# Patient Record
Sex: FEMALE | Race: WHITE | NOT HISPANIC OR LATINO | Employment: OTHER | URBAN - METROPOLITAN AREA
[De-identification: names, ages, dates, MRNs, and addresses within clinical notes are randomized per-mention and may not be internally consistent; named-entity substitution may affect disease eponyms.]

---

## 2023-04-05 ENCOUNTER — NURSING HOME VISIT (OUTPATIENT)
Dept: POST ACUTE CARE | Facility: EXTERNAL LOCATION | Age: 63
End: 2023-04-05
Payer: COMMERCIAL

## 2023-04-05 DIAGNOSIS — F25.0 SCHIZOAFFECTIVE DISORDER, BIPOLAR TYPE (MULTI): ICD-10-CM

## 2023-04-05 DIAGNOSIS — E78.2 HYPERLIPEMIA, MIXED: ICD-10-CM

## 2023-04-05 DIAGNOSIS — M48.061 SPINAL STENOSIS OF LUMBAR REGION WITHOUT NEUROGENIC CLAUDICATION: ICD-10-CM

## 2023-04-05 DIAGNOSIS — F33.41 RECURRENT MAJOR DEPRESSIVE DISORDER, IN PARTIAL REMISSION (CMS-HCC): ICD-10-CM

## 2023-04-05 DIAGNOSIS — I48.0 PAROXYSMAL ATRIAL FIBRILLATION (MULTI): ICD-10-CM

## 2023-04-05 DIAGNOSIS — J44.9 COPD MIXED TYPE (MULTI): Primary | ICD-10-CM

## 2023-04-05 DIAGNOSIS — K21.9 GERD WITHOUT ESOPHAGITIS: ICD-10-CM

## 2023-04-05 DIAGNOSIS — G45.9 TIA (TRANSIENT ISCHEMIC ATTACK): ICD-10-CM

## 2023-04-05 PROBLEM — M79.7 FIBROMYALGIA: Status: ACTIVE | Noted: 2023-04-05

## 2023-04-05 PROBLEM — M54.17 LUMBOSACRAL RADICULOPATHY AT L4: Status: ACTIVE | Noted: 2023-04-05

## 2023-04-05 PROCEDURE — 99309 SBSQ NF CARE MODERATE MDM 30: CPT | Performed by: INTERNAL MEDICINE

## 2023-04-05 NOTE — LETTER
Patient: Gray Kim  : 1960    Encounter Date: 2023    Patient ID: Gray Kim is a 62 y.o. female who presents for No chief complaint on file..    Assessment/Plan    Problem List Items Addressed This Visit          Nervous    Spinal stenosis of lumbar region without neurogenic claudication     MRI of the brain neurosurgery evaluation at [facility] for bladder bowel dysfunction            Respiratory    COPD mixed type (CMS/HCC) - Primary     Pt has moderate to severe COPD. It is progressive disease, use of MDI and proper technique discussed, rinse mouth after inhaled corticosteroids. Notify if progressive dyspnea or cough or lethargy, monitor oxygen saturation if possible with home based pulse oximetry. Avoid hospitalization and call us if any flare ups are about to happen, be sure that annual flu vaccine are uptodate and review need for pneumococcal vaccine. Light to moderate low impact exercises are very helpful and deep breathing  exercises are beneficial in chronic lung diseases.             Circulatory    Paroxysmal atrial fibrillation (CMS/HCC)     Pt has history AF, rate &/or rhythm therapy has been considered, Anticoagulation as listed and as appropriate. Atrial fibrillation is very common as we age. Fast ventricular rate or simply rate control strategy leads to poor quality of life with chronic fatigue, dyspnea and lack of endurance. Chadsvasc score has been looked into. Cardiology if managing QT prolongation as appropriate. If palpitations or SOB happens then please notify us. QOL can be maintained as good as we can with proper strategic therapy for AF.Chances of embolism or embolic events remain high without anticoagulation therapy.           TIA (transient ischemic attack)       Digestive    GERD without esophagitis       Other    Schizoaffective disorder, bipolar type (CMS/HCC)    Hyperlipemia, mixed    Recurrent major depressive disorder, in partial remission (CMS/HCC)      Depression is chronic and quite common and notorious mental health disorder and it is quite common and widespread, there are several therapeutics available for depression now a days. It is considered as chemical imbalance disorder and with medications , it can be adjusted. There are side effects from SSRI and SNRIs but on long term, they are well tolerated. Please do not feel awkward or shy to contact us if feel tired, sleepy, lack of energy or aloof/ alone. Untreated depression can bring serious consequences including suicidal ideations, mental health counselling is available if need arises. Usually treatment of depression is long lasting therapy with periodic evaluations and follow ups. Pt with depression no longer have to feel frustrated, helpless or isolated.Detailed discussion was carried out.           This patient was seen for my regular monthly visit, nursing evaluations and nursing notes were reviewed, interim events are reviewed, interim concerns and messages were reviewed as we have communicated with nursing staff.  Any issues with the falls, skin care impairment, declining physical condition are reviewed and noted, diagnosis list were reviewed, list of medications were reviewed, living will related issues were reviewed, overall patient has been doing well, any declining in patient's condition or any change in patient's condition needs to be notified to physician promptly, discussed with nursing staff, if needed would communicate with family.  Patient stays confined here at the facility for long-term care, there are always concerns about long-term care related issues and concerns.  Nursing staff is trying their best to keep patient safe, all sort of measures has been taken to keep patient safe and comfortable.   Source of history: Nurse, Medical personnel, Medical record, Patient.  History limitation: None.      HPI  62-year-old patient had a complex medical problem COPD history is affective disorder  bipolar anxiety hypertension GERD cardiomegaly obesity psychosis and anemia spondyloptosis TIA history of hypoxic respiratory failure with COPD evaluated for cough congestion arthralgia myalgia fatigue tired weakness continue edema given Lasix potassium magnesium continue home back pain refer patient to [facility] for MRI of the spine and the neurology evaluation.  Continue gabapentin and meloxicam    Not on File    Medications   Order Order Status Revised Last Ordered    Atorvastatin Calcium Oral Tablet 40 MG (Atorvastatin Calcium) Active 3/13/2023 3/13/2023  This order is potentially duplicated by other orders on the chart. Click to view details.    Furosemide Oral Tablet 40 MG (Furosemide) Active 3/8/2023 3/8/2023    Polyethylene Glycol 3350 Powder (Polyethylene Glycol 3350 (Bulk)) Active 1/13/2023     Docusate Sodium Oral Capsule 100 MG (Docusate Sodium) Active 1/13/2023     Acetaminophen Oral Tablet 500 MG (Acetaminophen) Active 1/16/2023     Gabapentin Oral Tablet 600 MG (Gabapentin) Active 3/10/2023 3/10/2023    Cold Sore Products External Ointment (Cold Sore Products) Active 1/13/2023     Albuterol Sulfate HFA Inhalation Aerosol Solution 108 (90 Base) MCG/ACT (Albuterol Sulfate) Active 4/2/2023 4/2/2023    Cholecalciferol Oral Tablet 50 MCG (2000 UT) (Cholecalciferol) Active 1/14/2023   This order is potentially duplicated by other orders on the chart. Click to view details.    Loratadine Oral Tablet 10 MG (Loratadine) Active 1/14/2023     Ferrous Sulfate Oral Tablet 325 (65 Fe) MG (Ferrous Sulfate) Active 1/14/2023     Omeprazole Oral Capsule Delayed Release 20 MG (Omeprazole) Active 3/3/2023 3/3/2023    Fluticasone Propionate Nasal Suspension 50 MCG/ACT (Fluticasone Propionate (Nasal)) Active 1/14/2023     Saline Mist Spray Nasal Solution 0.65 % (Saline) Active 1/13/2023     guaiFENesin Oral Liquid 100 MG/5ML (Guaifenesin) Active 1/18/2023   This order is potentially duplicated by other orders on the chart.  Click to view details.    Ibuprofen Oral Tablet 200 MG (Ibuprofen) Active 1/16/2023     Rivaroxaban Oral Tablet 20 MG (Rivaroxaban) Active 3/29/2023 3/29/2023    traZODone HCl Oral Tablet 100 MG (Trazodone HCl) Active 3/13/2023 3/13/2023    Divalproex Sodium Oral Tablet Delayed Release 125 MG (Divalproex Sodium) Active 3/24/2023 3/24/2023    Artificial Tears Ophthalmic Solution 0.5-0.6 % (Polyvinyl Alcohol-Povidone (Ophth)) Active 1/16/2023     Lidocaine Pain Relief External Patch 4 % (Lidocaine) Active 1/21/2023 1/16/2023    Ammonium Lactate External Lotion 12 % (Lactic Acid (Ammonium Lactate)) Active 1/20/2023   This order is potentially duplicated by other orders on the chart. Click to view details.    traMADol HCl Oral Tablet 50 MG (Tramadol HCl) Active 3/22/2023 3/22/2023    busPIRone HCl Oral Tablet 10 MG (Buspirone HCl) Active 3/16/2023 3/16/2023    Simethicone Oral Tablet Chewable 80 MG (Simethicone) Active 2/12/2023 2/10/2023    ZyPREXA Oral Tablet 10 MG (Olanzapine) Active 3/13/2023 3/13/2023  This order is potentially duplicated by other orders on the chart. Click to view details.    hydrOXYzine HCl Oral Tablet 25 MG (Hydroxyzine HCl) Active 3/5/2023 3/5/2023    Nicotine Polacrilex Mouth/Throat Gum 2 MG (Nicotine Polacrilex) Active 3/26/2023 3/26/2023    Bisacodyl Oral Tablet Delayed Release 5 MG (Bisacodyl) Active 3/23/2023 3/23/2023  This order is potentially duplicated by other orders on the chart. Click to view details.    Meloxicam Tablet 7.5 MG Active 3/27/2023 3/26/2023  This order is potentially duplicated by other orders on the chart. Click to view details.    Furosemide Oral Tablet 20 MG (Furosemide) Active 4/5/2023 4/4/2023        Objective  Visit Vitals  Smoking Status Former     Current Vitals   BP: 117/60 mmHg  4/5/2023 12:34    Temp:97.3 °F  4/5/2023 12:34  Pulse:74 bpm  4/5/2023 12:34    Weight:290.8 Lbs  4/3/2023 09:56  Resp:20 Breaths/min  4/5/2023 12:34  BS:97 mg/dL  2/3/2023  07:34  O2:95 %  4/5/2023 09:52  Pain:0  4/5/2023 14:41      PHYSICAL EXAM  General: NAD. NCAT. Aox3   HEENT: PERRLA. EOMI. MMM. Nares patent bl.  Cardiovascular: Irregular  Respiratory: Crackles/rhonchi arthritis of spine  GI: Soft, NT abdomen. BS present x 4.   : No CVAT BL  MSK: Lumbar cervical radiculopathy  Extremities: No edema. Cap refill < 2 sec.   Skin: No rashes or bruises.   Neuro: Aox3. Cranial Nerves grossly intact. Motor/sensory wnl.   Psych: Mood wnl.          ROS  Constitutional: Denies fevers, chills, fatigue, weight loss/gain  HEENT: Denies HA, vision changes, hearing loss, sore throat  Cardiac: Denies CP, palpitations, edema  Respiratory: Denies SOB, cough, pleuritic chest pain, PND, orthopnea  GI: Denies N/V/D, abd pain, constipation, black/bloody stools  : Denies urinary changes, frequency, hematuria, urgency, retention, flank pain  MSK: Denies joint pain, joint swelling, back pain, neck pain, extremity pain  Neuro: Denies numbness, weakness, tingling      There is no immunization history on file for this patient.    [facility] Encounter on 01/03/2023   Component Date Value Ref Range Status   • WBC 01/03/2023 7.5  4.4 - 11.3 x10E9/L Final   • nRBC 01/03/2023 0.0  0.0 - 0.0 /100 WBC Final   • RBC 01/03/2023 3.89 (L)  4.00 - 5.20 x10E12/L Final   • Hemoglobin 01/03/2023 10.1 (L)  12.0 - 16.0 g/dL Final   • Hematocrit 01/03/2023 34.1 (L)  36.0 - 46.0 % Final   • MCV 01/03/2023 88  80 - 100 fL Final   • MCHC 01/03/2023 29.6 (L)  32.0 - 36.0 g/dL Final   • Platelets 01/03/2023 323  150 - 450 x10E9/L Final   • RDW 01/03/2023 17.8 (H)  11.5 - 14.5 % Final   • Glucose 01/03/2023 82  74 - 99 mg/dL Final   • Sodium 01/03/2023 136  136 - 145 mmol/L Final   • Potassium 01/03/2023 5.0  3.5 - 5.3 mmol/L Final   • Chloride 01/03/2023 100  98 - 107 mmol/L Final   • Bicarbonate 01/03/2023 30  21 - 32 mmol/L Final   • Anion Gap 01/03/2023 11  10 - 20 mmol/L Final   • Urea Nitrogen 01/03/2023 24 (H)  6 - 23 mg/dL  Final   • Creatinine 01/03/2023 0.87  0.50 - 1.05 mg/dL Final   • GFR Female 01/03/2023 76  >90 mL/min/1.73m2 Final   • Calcium 01/03/2023 10.0  8.6 - 10.3 mg/dL Final       Radiology: Reviewed imaging in powerchart.  No results found.    No family history on file.  Social History     Socioeconomic History   • Marital status: Single     Spouse name: None   • Number of children: None   • Years of education: None   • Highest education level: None   Occupational History   • None   Tobacco Use   • Smoking status: Former     Types: Cigarettes   • Smokeless tobacco: Never   Vaping Use   • Vaping status: None   Substance and Sexual Activity   • Alcohol use: Not Currently   • Drug use: Not Currently   • Sexual activity: None   Other Topics Concern   • None   Social History Narrative   • None     Social Determinants of Health     Financial Resource Strain: Not on file   Food Insecurity: Not on file   Transportation Needs: Not on file   Physical Activity: Not on file   Stress: Not on file   Social Connections: Not on file   Intimate Partner Violence: Not on file   Housing Stability: Not on file     Past Medical History:   Diagnosis Date   • Bipolar disorder, currently in remission, most recent episode unspecified (CMS/[facility]) 02/11/2021    History of depressed bipolar disorder     No past surgical history on file.  * Cannot find OR log *    Charting was completed using voice recognition technology and may include unintended errors.           Electronically Signed By: Gil Disla MD   4/5/23  8:27 PM

## 2023-04-06 NOTE — ASSESSMENT & PLAN NOTE
Pt has history AF, rate &/or rhythm therapy has been considered, Anticoagulation as listed and as appropriate. Atrial fibrillation is very common as we age. Fast ventricular rate or simply rate control strategy leads to poor quality of life with chronic fatigue, dyspnea and lack of endurance. Chadsvasc score has been looked into. Cardiology if managing QT prolongation as appropriate. If palpitations or SOB happens then please notify us. QOL can be maintained as good as we can with proper strategic therapy for AF.Chances of embolism or embolic events remain high without anticoagulation therapy.

## 2023-04-06 NOTE — ASSESSMENT & PLAN NOTE
Depression is chronic and quite common and notorious mental health disorder and it is quite common and widespread, there are several therapeutics available for depression now a days. It is considered as chemical imbalance disorder and with medications , it can be adjusted. There are side effects from SSRI and SNRIs but on long term, they are well tolerated. Please do not feel awkward or shy to contact us if feel tired, sleepy, lack of energy or aloof/ alone. Untreated depression can bring serious consequences including suicidal ideations, mental health counselling is available if need arises. Usually treatment of depression is long lasting therapy with periodic evaluations and follow ups. Pt with depression no longer have to feel frustrated, helpless or isolated.Detailed discussion was carried out.

## 2023-04-06 NOTE — PROGRESS NOTES
Patient ID: Gray Kim is a 62 y.o. female who presents for No chief complaint on file..    Assessment/Plan     Problem List Items Addressed This Visit          Nervous    Spinal stenosis of lumbar region without neurogenic claudication     MRI of the brain neurosurgery evaluation at [facility] for bladder bowel dysfunction            Respiratory    COPD mixed type (CMS/HCC) - Primary     Pt has moderate to severe COPD. It is progressive disease, use of MDI and proper technique discussed, rinse mouth after inhaled corticosteroids. Notify if progressive dyspnea or cough or lethargy, monitor oxygen saturation if possible with home based pulse oximetry. Avoid hospitalization and call us if any flare ups are about to happen, be sure that annual flu vaccine are uptodate and review need for pneumococcal vaccine. Light to moderate low impact exercises are very helpful and deep breathing  exercises are beneficial in chronic lung diseases.             Circulatory    Paroxysmal atrial fibrillation (CMS/HCC)     Pt has history AF, rate &/or rhythm therapy has been considered, Anticoagulation as listed and as appropriate. Atrial fibrillation is very common as we age. Fast ventricular rate or simply rate control strategy leads to poor quality of life with chronic fatigue, dyspnea and lack of endurance. Chadsvasc score has been looked into. Cardiology if managing QT prolongation as appropriate. If palpitations or SOB happens then please notify us. QOL can be maintained as good as we can with proper strategic therapy for AF.Chances of embolism or embolic events remain high without anticoagulation therapy.           TIA (transient ischemic attack)       Digestive    GERD without esophagitis       Other    Schizoaffective disorder, bipolar type (CMS/HCC)    Hyperlipemia, mixed    Recurrent major depressive disorder, in partial remission (CMS/HCC)     Depression is chronic and quite common and notorious mental health disorder and  it is quite common and widespread, there are several therapeutics available for depression now a days. It is considered as chemical imbalance disorder and with medications , it can be adjusted. There are side effects from SSRI and SNRIs but on long term, they are well tolerated. Please do not feel awkward or shy to contact us if feel tired, sleepy, lack of energy or aloof/ alone. Untreated depression can bring serious consequences including suicidal ideations, mental health counselling is available if need arises. Usually treatment of depression is long lasting therapy with periodic evaluations and follow ups. Pt with depression no longer have to feel frustrated, helpless or isolated.Detailed discussion was carried out.           This patient was seen for my regular monthly visit, nursing evaluations and nursing notes were reviewed, interim events are reviewed, interim concerns and messages were reviewed as we have communicated with nursing staff.  Any issues with the falls, skin care impairment, declining physical condition are reviewed and noted, diagnosis list were reviewed, list of medications were reviewed, living will related issues were reviewed, overall patient has been doing well, any declining in patient's condition or any change in patient's condition needs to be notified to physician promptly, discussed with nursing staff, if needed would communicate with family.  Patient stays confined here at the facility for long-term care, there are always concerns about long-term care related issues and concerns.  Nursing staff is trying their best to keep patient safe, all sort of measures has been taken to keep patient safe and comfortable.   Source of history: Nurse, Medical personnel, Medical record, Patient.  History limitation: None.      HPI  62-year-old patient had a complex medical problem COPD history is affective disorder bipolar anxiety hypertension GERD cardiomegaly obesity psychosis and anemia  spondyloptosis TIA history of hypoxic respiratory failure with COPD evaluated for cough congestion arthralgia myalgia fatigue tired weakness continue edema given Lasix potassium magnesium continue home back pain [facility] and the neurology evaluation.  Continue gabapentin and meloxicam    Not on File    Medications   Order Order Status Revised Last Ordered    Atorvastatin Calcium Oral Tablet 40 MG (Atorvastatin Calcium) Active 3/13/2023 3/13/2023  This order is potentially duplicated by other orders on the chart. Click to view details.    Furosemide Oral Tablet 40 MG (Furosemide) Active 3/8/2023 3/8/2023    Polyethylene Glycol 3350 Powder (Polyethylene Glycol 3350 (Bulk)) Active 1/13/2023     Docusate Sodium Oral Capsule 100 MG (Docusate Sodium) Active 1/13/2023     Acetaminophen Oral Tablet 500 MG (Acetaminophen) Active 1/16/2023     Gabapentin Oral Tablet 600 MG (Gabapentin) Active 3/10/2023 3/10/2023    Cold Sore Products External Ointment (Cold Sore Products) Active 1/13/2023     Albuterol Sulfate HFA Inhalation Aerosol Solution 108 (90 Base) MCG/ACT (Albuterol Sulfate) Active 4/2/2023 4/2/2023    Cholecalciferol Oral Tablet 50 MCG (2000 UT) (Cholecalciferol) Active 1/14/2023   This order is potentially duplicated by other orders on the chart. Click to view details.    Loratadine Oral Tablet 10 MG (Loratadine) Active 1/14/2023     Ferrous Sulfate Oral Tablet 325 (65 Fe) MG (Ferrous Sulfate) Active 1/14/2023     Omeprazole Oral Capsule Delayed Release 20 MG (Omeprazole) Active 3/3/2023 3/3/2023    Fluticasone Propionate Nasal Suspension 50 MCG/ACT (Fluticasone Propionate (Nasal)) Active 1/14/2023     Saline Mist Spray Nasal Solution 0.65 % (Saline) Active 1/13/2023     guaiFENesin Oral Liquid 100 MG/5ML (Guaifenesin) Active 1/18/2023   This order is potentially duplicated by other orders on the chart. Click to view details.    Ibuprofen Oral Tablet 200 MG  (Ibuprofen) Active 1/16/2023     Rivaroxaban Oral Tablet 20 MG (Rivaroxaban) Active 3/29/2023 3/29/2023    traZODone HCl Oral Tablet 100 MG (Trazodone HCl) Active 3/13/2023 3/13/2023    Divalproex Sodium Oral Tablet Delayed Release 125 MG (Divalproex Sodium) Active 3/24/2023 3/24/2023    Artificial Tears Ophthalmic Solution 0.5-0.6 % (Polyvinyl Alcohol-Povidone (Ophth)) Active 1/16/2023     Lidocaine Pain Relief External Patch 4 % (Lidocaine) Active 1/21/2023 1/16/2023    Ammonium Lactate External Lotion 12 % (Lactic Acid (Ammonium Lactate)) Active 1/20/2023   This order is potentially duplicated by other orders on the chart. Click to view details.    traMADol HCl Oral Tablet 50 MG (Tramadol HCl) Active 3/22/2023 3/22/2023    busPIRone HCl Oral Tablet 10 MG (Buspirone HCl) Active 3/16/2023 3/16/2023    Simethicone Oral Tablet Chewable 80 MG (Simethicone) Active 2/12/2023 2/10/2023    ZyPREXA Oral Tablet 10 MG (Olanzapine) Active 3/13/2023 3/13/2023  This order is potentially duplicated by other orders on the chart. Click to view details.    hydrOXYzine HCl Oral Tablet 25 MG (Hydroxyzine HCl) Active 3/5/2023 3/5/2023    Nicotine Polacrilex Mouth/Throat Gum 2 MG (Nicotine Polacrilex) Active 3/26/2023 3/26/2023    Bisacodyl Oral Tablet Delayed Release 5 MG (Bisacodyl) Active 3/23/2023 3/23/2023  This order is potentially duplicated by other orders on the chart. Click to view details.    Meloxicam Tablet 7.5 MG Active 3/27/2023 3/26/2023  This order is potentially duplicated by other orders on the chart. Click to view details.    Furosemide Oral Tablet 20 MG (Furosemide) Active 4/5/2023 4/4/2023        Objective   Visit Vitals  Smoking Status Former     Current Vitals   BP: 117/60 mmHg  4/5/2023 12:34    Temp:97.3 °F  4/5/2023 12:34  Pulse:74 bpm  4/5/2023 12:34    Weight:290.8 Lbs  4/3/2023 09:56  Resp:20 Breaths/min  4/5/2023 12:34  BS:97 mg/dL  2/3/2023 07:34  O2:95 %  4/5/2023 09:52  Pain:0  4/5/2023  14:41      PHYSICAL EXAM  General: NAD. NCAT. Aox3   HEENT: PERRLA. EOMI. MMM. Nares patent bl.  Cardiovascular: Irregular  Respiratory: Crackles/rhonchi arthritis of spine  GI: Soft, NT abdomen. BS present x 4.   : No CVAT BL  MSK: Lumbar cervical radiculopathy  Extremities: No edema. Cap refill < 2 sec.   Skin: No rashes or bruises.   Neuro: Aox3. Cranial Nerves grossly intact. Motor/sensory wnl.   Psych: Mood wnl.          ROS  Constitutional: Denies fevers, chills, fatigue, weight loss/gain  HEENT: Denies HA, vision changes, hearing loss, sore throat  Cardiac: Denies CP, palpitations, edema  Respiratory: Denies SOB, cough, pleuritic chest pain, PND, orthopnea  GI: Denies N/V/D, abd pain, constipation, black/bloody stools  : Denies urinary changes, frequency, hematuria, urgency, retention, flank pain  MSK: Denies joint pain, joint swelling, back pain, neck pain, extremity pain  Neuro: Denies numbness, weakness, tingling      There is no immunization history on file for this patient.    [facility] Encounter on 01/03/2023   Component Date Value Ref Range Status    WBC 01/03/2023 7.5  4.4 - 11.3 x10E9/L Final    nRBC 01/03/2023 0.0  0.0 - 0.0 /100 WBC Final    RBC 01/03/2023 3.89 (L)  4.00 - 5.20 x10E12/L Final    Hemoglobin 01/03/2023 10.1 (L)  12.0 - 16.0 g/dL Final    Hematocrit 01/03/2023 34.1 (L)  36.0 - 46.0 % Final    MCV 01/03/2023 88  80 - 100 fL Final    MCHC 01/03/2023 29.6 (L)  32.0 - 36.0 g/dL Final    Platelets 01/03/2023 323  150 - 450 x10E9/L Final    RDW 01/03/2023 17.8 (H)  11.5 - 14.5 % Final    Glucose 01/03/2023 82  74 - 99 mg/dL Final    Sodium 01/03/2023 136  136 - 145 mmol/L Final    Potassium 01/03/2023 5.0  3.5 - 5.3 mmol/L Final    Chloride 01/03/2023 100  98 - 107 mmol/L Final    Bicarbonate 01/03/2023 30  21 - 32 mmol/L Final    Anion Gap 01/03/2023 11  10 - 20 mmol/L Final    Urea Nitrogen 01/03/2023 24 (H)  6 - 23 mg/dL Final    Creatinine 01/03/2023 0.87  0.50 - 1.05 mg/dL Final     GFR Female 01/03/2023 76  >90 mL/min/1.73m2 Final    Calcium 01/03/2023 10.0  8.6 - 10.3 mg/dL Final       Radiology: Reviewed imaging in powerchart.  No results found.    No family history on file.  Social History     Socioeconomic History    Marital status: Single     Spouse name: None    Number of children: None    Years of education: None    Highest education level: None   Occupational History    None   Tobacco Use    Smoking status: Former     Types: Cigarettes    Smokeless tobacco: Never   Vaping Use    Vaping status: None   Substance and Sexual Activity    Alcohol use: Not Currently    Drug use: Not Currently    Sexual activity: None   Other Topics Concern    None   Social History Narrative    None     Social Determinants of Health     Financial Resource Strain: Not on file   Food Insecurity: Not on file   Transportation Needs: Not on file   Physical Activity: Not on file   Stress: Not on file   Social Connections: Not on file   Intimate Partner Violence: Not on file   Housing Stability: Not on file     Past Medical History:   Diagnosis Date    Bipolar disorder, currently in remission, most recent episode unspecified (CMS/[facility]) 02/11/2021    History of depressed bipolar disorder     No past surgical history on file.  * Cannot find OR log *    Charting was completed using voice recognition technology and may include unintended errors.

## 2023-04-06 NOTE — ASSESSMENT & PLAN NOTE
Pt has moderate to severe COPD. It is progressive disease, use of MDI and proper technique discussed, rinse mouth after inhaled corticosteroids. Notify if progressive dyspnea or cough or lethargy, monitor oxygen saturation if possible with home based pulse oximetry. Avoid hospitalization and call us if any flare ups are about to happen, be sure that annual flu vaccine are uptodate and review need for pneumococcal vaccine. Light to moderate low impact exercises are very helpful and deep breathing  exercises are beneficial in chronic lung diseases.

## 2023-07-15 ENCOUNTER — NURSING HOME VISIT (OUTPATIENT)
Dept: POST ACUTE CARE | Facility: EXTERNAL LOCATION | Age: 63
End: 2023-07-15
Payer: COMMERCIAL

## 2023-07-15 DIAGNOSIS — K21.9 GERD WITHOUT ESOPHAGITIS: ICD-10-CM

## 2023-07-15 DIAGNOSIS — J44.9 COPD MIXED TYPE (MULTI): ICD-10-CM

## 2023-07-15 DIAGNOSIS — E78.2 HYPERLIPEMIA, MIXED: ICD-10-CM

## 2023-07-15 DIAGNOSIS — E66.01 MORBID OBESITY (MULTI): ICD-10-CM

## 2023-07-15 DIAGNOSIS — I48.0 PAROXYSMAL ATRIAL FIBRILLATION (MULTI): ICD-10-CM

## 2023-07-15 DIAGNOSIS — F25.0 SCHIZOAFFECTIVE DISORDER, BIPOLAR TYPE (MULTI): ICD-10-CM

## 2023-07-15 DIAGNOSIS — F33.41 RECURRENT MAJOR DEPRESSIVE DISORDER, IN PARTIAL REMISSION (CMS-HCC): ICD-10-CM

## 2023-07-15 DIAGNOSIS — M48.061 SPINAL STENOSIS OF LUMBAR REGION WITHOUT NEUROGENIC CLAUDICATION: Primary | ICD-10-CM

## 2023-07-15 PROCEDURE — 99309 SBSQ NF CARE MODERATE MDM 30: CPT | Performed by: INTERNAL MEDICINE

## 2023-07-15 NOTE — LETTER
Patient: Gray Kim  : 1960    Encounter Date: 07/15/2023    Name: Gray Kim  YOB: 1960    FOLLOW UP VISIT:   Allergies: Aspirin, Naproxen, Nicotine, Penicillin, Simvastatin, NSAIDs   Code Status: CPR/FULL CODE    Special Instructions:    Diet: Regular diet, Regular texture, Regular consistency   Diagnosis: CHRONIC OBSTRUCTIVE PULMONARY DISEASE, UNSPECIFIED  SUBJECTIVE:62-year-old patient who had a history of COPD mental health problem complains of cough congestion shortness of breath decreased ADL mobility arthralgia myalgia fatigue tired weakness moderate back pain knee pain advised continue gabapentin tramadol meloxicam Mylanta nicotine patch Zyprexa inhaler refer patient to psych and pain management    REVIEW OF SYSTEMS:   All review of systems are negative unless otherwise stated above under subjective.    LABS REVIEWED AT FACILITY:    MEDICATIONS REVIEWED AT FACILITY:   Order Order Status Revised Last Ordered    Atorvastatin Calcium Oral Tablet 40 MG (Atorvastatin Calcium) Active 2023    Cold Sore Products External Ointment (Cold Sore Products) Active 2023     Cholecalciferol Oral Tablet 50 MCG (2000 UT) (Cholecalciferol) Active 2023   This order is potentially duplicated by other orders on the chart. Click to view details.    Loratadine Oral Tablet 10 MG (Loratadine) Active 2023     Ferrous Sulfate Oral Tablet 325 (65 Fe) MG (Ferrous Sulfate) Active 2023     Omeprazole Oral Capsule Delayed Release 20 MG (Omeprazole) Active 2023  This order is potentially duplicated by other orders on the chart. Click to view details.    Saline Mist Spray Nasal Solution 0.65 % (Saline) Active 2023     guaiFENesin Oral Liquid 100 MG/5ML (Guaifenesin) Active 2023     Rivaroxaban Oral Tablet 20 MG (Rivaroxaban) Active 2023    traZODone HCl Oral Tablet 100 MG (Trazodone HCl) Active 2023    Divalproex Sodium  Oral Tablet Delayed Release 125 MG (Divalproex Sodium) Active 7/15/2023 7/15/2023    Artificial Tears Ophthalmic Solution 0.5-0.6 % (Polyvinyl Alcohol-Povidone (Ophth)) Active 1/16/2023     Ammonium Lactate External Lotion 12 % (Lactic Acid (Ammonium Lactate)) Active 1/20/2023     busPIRone HCl Oral Tablet 10 MG (Buspirone HCl) Active 7/12/2023 7/12/2023  This order is potentially duplicated by other orders on the chart. Click to view details.    Simethicone Oral Tablet Chewable 80 MG (Simethicone) Active 2/12/2023 2/10/2023  This order is potentially duplicated by other orders on the chart. Click to view details.    hydrOXYzine HCl Oral Tablet 25 MG (Hydroxyzine HCl) Active 7/12/2023 7/12/2023    Bisacodyl Oral Tablet Delayed Release 5 MG (Bisacodyl) Active 3/23/2023 3/23/2023    Lidocaine Pain Relief External Patch 4 % (Lidocaine) Active 6/9/2023 4/10/2023    Docusate Sodium Oral Capsule 100 MG (Docusate Sodium) Active 4/16/2023     Polyethylene Glycol 3350 Powder (Polyethylene Glycol 3350 (Bulk)) Active 4/16/2023     Lasix Oral Tablet 20 MG (Furosemide) Active 7/16/2023 7/16/2023  This order is potentially duplicated by other orders on the chart. Click to view details.    Saline Nasal Spray Solution (Saline) Active 5/5/2023 5/5/2023    Acetaminophen Oral Tablet 500 MG (Acetaminophen) Active 5/18/2023   This order is potentially duplicated by other orders on the chart. Click to view details.    Biofreeze External Gel 4 % (Menthol (Topical Analgesic)) Active 5/25/2023 5/19/2023    Fluticasone Propionate Nasal Suspension 50 MCG/ACT (Fluticasone Propionate (Nasal)) Active 5/28/2023     Albuterol Sulfate  (90 Base) MCG/ACT Aerosol, solution Active 6/5/2023 6/1/2023    ZyPREXA Oral Tablet 10 MG (Olanzapine) Active 6/10/2023 6/10/2023  This order is potentially duplicated by other orders on the chart. Click to view details.    Nicotine Patch 24 Hour 21 MG/24HR Active 6/23/2023   This order is potentially  duplicated by other orders on the chart. Click to view details.    Nicotine Transdermal Patch 24 Hour 14 MG/24HR (Nicotine) Active 6/22/2023   This order is potentially duplicated by other orders on the chart. Click to view details.    Nicotine Transdermal Patch 24 Hour 7 MG/24HR (Nicotine) Active 6/22/2023   This order is potentially duplicated by other orders on the chart. Click to view details.    Mylanta Suspension 200-200-20 MG/5ML (Alum & Mag Hydroxide-Simeth) Active 7/14/2023 7/12/2023    Gabapentin Oral Tablet 600 MG (Gabapentin) Active 7/16/2023 7/16/2023    Meloxicam Tablet 7.5 MG Active 7/16/2023 7/16/2023  This order is potentially duplicated by other orders on the chart. Click to view details.    traMADol HCl Oral Tablet 50 MG (Tramadol HCl)  Living will related issues reviewed-Code status:     OBJECTIVE: ICD-10 Short Description  1/13/2023    J44.9 CHRONIC OBSTRUCTIVE PULMONARY DISEASE, UNSPECIFIED  1/13/2023    F25.9 SCHIZOAFFECTIVE DISORDER, UNSPECIFIED  1/13/2023    F31.9 BIPOLAR DISORDER, UNSPECIFIED  1/13/2023    F41.9 ANXIETY DISORDER, UNSPECIFIED  1/13/2023    I10 ESSENTIAL (PRIMARY) HYPERTENSION  1/13/2023    K21.9 GASTRO-ESOPHAGEAL REFLUX DISEASE WITHOUT ESOPHAGITIS  1/13/2023    G47.33 OBSTRUCTIVE SLEEP APNEA (ADULT) (PEDIATRIC  1/13/2023    I51.7 CARDIOMEGALY  1/13/2023    R26.2 DIFFICULTY IN WALKING, NOT ELSEWHERE CLASSIFIED  1/13/2023    M62.81 MUSCLE WEAKNESS (GENERALIZED  1/13/2023    E66.01 MORBID (SEVERE) OBESITY DUE TO EXCESS CALORIES  1/13/2023    F11.90 OPIOID USE, UNSPECIFIED, UNCOMPLICATED  1/13/2023    F29 UNSP PSYCHOSIS NOT DUE TO A SUBSTANCE OR KNOWN PHYSIOL COND  1/13/2023    D64.9 ANEMIA, UNSPECIFIED  1/13/2023    M47.9 SPONDYLOSIS, UNSPECIFIED  1/13/2023    E87.5 HYPERKALEMIA  1/13/2023    Z86.73 PRSNL HX OF TIA (TIA), 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  Physical Exam  Current Vitals   BP: 123/81 mmHg  7/12/2023 14:54  Temp:97.8 °F  7/12/2023 14:54    Pulse:103 bpm  7/12/2023 14:54  Weight:277.6 Lbs  7/3/2023 14:12  Resp:20 Breaths/min  7/12/2023 14:54  BS:97 mg/dL  2/3/2023 07:34  O2:97 %  7/18/2023 10:59  Pain:0  7/18/2023 12:27    Physical Exam:    Appearance: Obesity  Skin: Intact,  dry skin, no lesions, rash, petechiae or purpura.     Eyes: PERRLA, EOMs intact,  Conjunctiva pink with no redness or exudates. Cornea & anterior chamber are clear, Eyelids without lesions. No scleral icterus.     ENT: Sinus congestion sore throat  Neck: JVD carotid bruit no lymphadenopathy.    Pulmonary: Decreased air entry crackles rales rhonchi systolic heart murmur. No accessory muscle use or stridor.    Cardiac: Systolic heart murmur carotids without bruits.    Abdomen: Epigastric tenderness y.  No rebound or guarding.  No CVA tenderness.    Genitourinary: Exam deferred.    Musculoskeletal: Arthritis of the spine both hip and both knee. No cyanosis, clubbing, or edema.    Neurological: Cervical lumbar radiculopathy  Psychiatric: Appropriate mood and affect.   Obesity  Assessment/Plan  Problem List Items Addressed This Visit          Cardiac and Vasculature    Paroxysmal atrial fibrillation (CMS/HCC)     Pt has history AF, rate &/or rhythm therapy has been considered, Anticoagulation as listed and as appropriate. Atrial fibrillation is very common as we age. Fast ventricular rate or simply rate control strategy leads to poor quality of life with chronic fatigue, dyspnea and lack of endurance. Chadsvasc score has been looked into. Cardiology if managing QT prolongation as appropriate. If palpitations or SOB happens then please notify us. QOL can be maintained as good as we can with proper strategic therapy for AF.Chances of embolism or embolic events remain high without anticoagulation therapy.          Hyperlipemia, mixed        Endocrine/Metabolic    Morbid obesity (CMS/HCC) - Primary       Gastrointestinal and Abdominal    GERD without esophagitis       Mental Health    Schizoaffective disorder, bipolar type (CMS/HCC)     PHQ Mini-Mental psych evaluation         Recurrent major depressive disorder, in partial remission (CMS/HCC)     Depression is chronic and quite common and notorious mental health disorder and it is quite common and widespread, there are several therapeutics available for depression now a days. It is considered as chemical imbalance disorder and with medications , it can be adjusted. There are side effects from SSRI and SNRIs but on long term, they are well tolerated. Please do not feel awkward or shy to contact us if feel tired, sleepy, lack of energy or aloof/ alone. Untreated depression can bring serious consequences including suicidal ideations, mental health counselling is available if need arises. Usually treatment of depression is long lasting therapy with periodic evaluations and follow ups. Pt with depression no longer have to feel frustrated, helpless or isolated.Detailed discussion was carried out.              Neuro    Spinal stenosis of lumbar region without neurogenic claudication     Physical therapy Occupational Therapy refer patient to pain management Dr. Alan            Pulmonary and Pneumonias    COPD mixed type (CMS/HCC)     Pt has moderate to severe COPD. It is progressive disease, use of MDI and proper technique discussed, rinse mouth after inhaled corticosteroids. Notify if progressive dyspnea or cough or lethargy, monitor oxygen saturation if possible with home based pulse oximetry. Avoid hospitalization and call us if any flare ups are about to happen, be sure that annual flu vaccine are uptodate and review need for pneumococcal vaccine. Light to moderate low impact exercises are very helpful and deep breathing  exercises are beneficial in chronic lung diseases.           This patient was seen  for my regular monthly visit, nursing evaluations and nursing notes were reviewed, interim events are reviewed, interim concerns and messages were reviewed as we have communicated with nursing staff.  Any issues with the falls, skin care impairment, declining physical condition are reviewed and noted, diagnosis list were reviewed, list of medications were reviewed, living will related issues were reviewed, overall patient has been doing well, any declining in patient's condition or any change in patient's condition needs to be notified to physician promptly, discussed with nursing staff, if needed would communicate with family.  Patient stays confined here at the facility for long-term care, there are always concerns about long-term care related issues and concerns.  Nursing staff is trying their best to keep patient safe, all sort of measures has been taken to keep patient safe and comfortable.   Skin integrity:  Nursing to monitor skin integrity as patient is at risk for pressure injuries.  Wound care per nursing    See Facility notes for measurements/assessments  Turn and reposition Q 2 hours or more  Air mattress and when up in chair cushion reducing device  Dietician to evaluate and recommend:  Nutritional supplement:  Please monitor skin integrity and other pressure areas  Referral to wound clinic if appropriate:    PLAN:  Pt has been seen for follow up visit.  The patient is here at facility for PT/OT/ST to maximize strength, function, endurance and safety.  The patient is participating in therapy. Gray Kim is making progress to the best of his/her ability.   Please see PT/OT/ST notes in the facility for detailed information regarding progression of patients progress.   Recent nursing evaluation and notes were reviewed.   Overall, patient is stable despite his/her chronic conditions.    #  Any decline or change in condition needs to be communicated with the physician or myself.    Discussion with nursing  staff regarding ongoing care and management.  If needed, would communicate with family who are not present at this time.   There are no concerns at this time.  We will continue with the medications noted above.    We will continue to follow the patient here at the facility.    Discharge planning:   Discharge Home when goals are met.   Follow up with PCP in 1-2 weeks after discharge from facility.   [facility] to follow after discharge for continued PT/OT and Nursing.    *Please note that nursing facility, outside laboratory agency, and [facility] do not interface.     Completion of the note was done through Dragon voice recognition technology and may include   unintended or grammatical errors which may not have been recognized when finalizing the note.     Time:    Gil Disla MD         Electronically Signed By: Gil Disla MD   7/18/23  2:01 PM

## 2023-07-18 PROBLEM — E66.01 MORBID OBESITY (MULTI): Status: ACTIVE | Noted: 2023-07-18

## 2023-07-18 NOTE — PROGRESS NOTES
Name: Gray Kim  YOB: 1960    FOLLOW UP VISIT:   Allergies: Aspirin, Naproxen, Nicotine, Penicillin, Simvastatin, NSAIDs   Code Status: CPR/FULL CODE    Special Instructions:    Diet: Regular diet, Regular texture, Regular consistency   Diagnosis: CHRONIC OBSTRUCTIVE PULMONARY DISEASE, UNSPECIFIED  SUBJECTIVE:62-year-old patient who had a history of COPD mental health problem complains of cough congestion shortness of breath decreased ADL mobility arthralgia myalgia fatigue tired weakness moderate back pain knee pain advised continue gabapentin tramadol meloxicam Mylanta nicotine patch Zyprexa inhaler refer patient to psych and pain management    REVIEW OF SYSTEMS:   All review of systems are negative unless otherwise stated above under subjective.    LABS REVIEWED AT FACILITY:    MEDICATIONS REVIEWED AT FACILITY:   Order Order Status Revised Last Ordered    Atorvastatin Calcium Oral Tablet 40 MG (Atorvastatin Calcium) Active 7/12/2023 7/12/2023    Cold Sore Products External Ointment (Cold Sore Products) Active 1/13/2023     Cholecalciferol Oral Tablet 50 MCG (2000 UT) (Cholecalciferol) Active 1/14/2023   This order is potentially duplicated by other orders on the chart. Click to view details.    Loratadine Oral Tablet 10 MG (Loratadine) Active 1/14/2023     Ferrous Sulfate Oral Tablet 325 (65 Fe) MG (Ferrous Sulfate) Active 1/14/2023     Omeprazole Oral Capsule Delayed Release 20 MG (Omeprazole) Active 7/11/2023 7/11/2023  This order is potentially duplicated by other orders on the chart. Click to view details.    Saline Mist Spray Nasal Solution 0.65 % (Saline) Active 1/13/2023     guaiFENesin Oral Liquid 100 MG/5ML (Guaifenesin) Active 1/18/2023     Rivaroxaban Oral Tablet 20 MG (Rivaroxaban) Active 7/12/2023 7/12/2023    traZODone HCl Oral Tablet 100 MG (Trazodone HCl) Active 7/12/2023 7/12/2023    Divalproex Sodium Oral Tablet Delayed Release 125 MG (Divalproex  Sodium) Active 7/15/2023 7/15/2023    Artificial Tears Ophthalmic Solution 0.5-0.6 % (Polyvinyl Alcohol-Povidone (Ophth)) Active 1/16/2023     Ammonium Lactate External Lotion 12 % (Lactic Acid (Ammonium Lactate)) Active 1/20/2023     busPIRone HCl Oral Tablet 10 MG (Buspirone HCl) Active 7/12/2023 7/12/2023  This order is potentially duplicated by other orders on the chart. Click to view details.    Simethicone Oral Tablet Chewable 80 MG (Simethicone) Active 2/12/2023 2/10/2023  This order is potentially duplicated by other orders on the chart. Click to view details.    hydrOXYzine HCl Oral Tablet 25 MG (Hydroxyzine HCl) Active 7/12/2023 7/12/2023    Bisacodyl Oral Tablet Delayed Release 5 MG (Bisacodyl) Active 3/23/2023 3/23/2023    Lidocaine Pain Relief External Patch 4 % (Lidocaine) Active 6/9/2023 4/10/2023    Docusate Sodium Oral Capsule 100 MG (Docusate Sodium) Active 4/16/2023     Polyethylene Glycol 3350 Powder (Polyethylene Glycol 3350 (Bulk)) Active 4/16/2023     Lasix Oral Tablet 20 MG (Furosemide) Active 7/16/2023 7/16/2023  This order is potentially duplicated by other orders on the chart. Click to view details.    Saline Nasal Spray Solution (Saline) Active 5/5/2023 5/5/2023    Acetaminophen Oral Tablet 500 MG (Acetaminophen) Active 5/18/2023   This order is potentially duplicated by other orders on the chart. Click to view details.    Biofreeze External Gel 4 % (Menthol (Topical Analgesic)) Active 5/25/2023 5/19/2023    Fluticasone Propionate Nasal Suspension 50 MCG/ACT (Fluticasone Propionate (Nasal)) Active 5/28/2023     Albuterol Sulfate  (90 Base) MCG/ACT Aerosol, solution Active 6/5/2023 6/1/2023    ZyPREXA Oral Tablet 10 MG (Olanzapine) Active 6/10/2023 6/10/2023  This order is potentially duplicated by other orders on the chart. Click to view details.    Nicotine Patch 24 Hour 21 MG/24HR Active 6/23/2023   This order is potentially duplicated by other orders on the chart. Click to view  details.    Nicotine Transdermal Patch 24 Hour 14 MG/24HR (Nicotine) Active 6/22/2023   This order is potentially duplicated by other orders on the chart. Click to view details.    Nicotine Transdermal Patch 24 Hour 7 MG/24HR (Nicotine) Active 6/22/2023   This order is potentially duplicated by other orders on the chart. Click to view details.    Mylanta Suspension 200-200-20 MG/5ML (Alum & Mag Hydroxide-Simeth) Active 7/14/2023 7/12/2023    Gabapentin Oral Tablet 600 MG (Gabapentin) Active 7/16/2023 7/16/2023    Meloxicam Tablet 7.5 MG Active 7/16/2023 7/16/2023  This order is potentially duplicated by other orders on the chart. Click to view details.    traMADol HCl Oral Tablet 50 MG (Tramadol HCl)  Living will related issues reviewed-Code status:     OBJECTIVE: ICD-10 Short Description  1/13/2023    J44.9 CHRONIC OBSTRUCTIVE PULMONARY DISEASE, UNSPECIFIED  1/13/2023    F25.9 SCHIZOAFFECTIVE DISORDER, UNSPECIFIED  1/13/2023    F31.9 BIPOLAR DISORDER, UNSPECIFIED  1/13/2023    F41.9 ANXIETY DISORDER, UNSPECIFIED  1/13/2023    I10 ESSENTIAL (PRIMARY) HYPERTENSION  1/13/2023    K21.9 GASTRO-ESOPHAGEAL REFLUX DISEASE WITHOUT ESOPHAGITIS  1/13/2023    G47.33 OBSTRUCTIVE SLEEP APNEA (ADULT) (PEDIATRIC  1/13/2023    I51.7 CARDIOMEGALY  1/13/2023    R26.2 DIFFICULTY IN WALKING, NOT ELSEWHERE CLASSIFIED  1/13/2023    M62.81 MUSCLE WEAKNESS (GENERALIZED  1/13/2023    E66.01 MORBID (SEVERE) OBESITY DUE TO EXCESS CALORIES  1/13/2023    F11.90 OPIOID USE, UNSPECIFIED, UNCOMPLICATED  1/13/2023    F29 UNSP PSYCHOSIS NOT DUE TO A SUBSTANCE OR KNOWN PHYSIOL COND  1/13/2023    D64.9 ANEMIA, UNSPECIFIED  1/13/2023    M47.9 SPONDYLOSIS, UNSPECIFIED  1/13/2023    E87.5 HYPERKALEMIA  1/13/2023    Z86.73 PRSNL HX OF TIA (TIA), 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  Physical  Exam  Current Vitals   BP: 123/81 mmHg  7/12/2023 14:54  Temp:97.8 °F  7/12/2023 14:54    Pulse:103 bpm  7/12/2023 14:54  Weight:277.6 Lbs  7/3/2023 14:12  Resp:20 Breaths/min  7/12/2023 14:54  BS:97 mg/dL  2/3/2023 07:34  O2:97 %  7/18/2023 10:59  Pain:0  7/18/2023 12:27    Physical Exam:    Appearance: Obesity  Skin: Intact,  dry skin, no lesions, rash, petechiae or purpura.     Eyes: PERRLA, EOMs intact,  Conjunctiva pink with no redness or exudates. Cornea & anterior chamber are clear, Eyelids without lesions. No scleral icterus.     ENT: Sinus congestion sore throat  Neck: JVD carotid bruit no lymphadenopathy.    Pulmonary: Decreased air entry crackles rales rhonchi systolic heart murmur. No accessory muscle use or stridor.    Cardiac: Systolic heart murmur carotids without bruits.    Abdomen: Epigastric tenderness y.  No rebound or guarding.  No CVA tenderness.    Genitourinary: Exam deferred.    Musculoskeletal: Arthritis of the spine both hip and both knee. No cyanosis, clubbing, or edema.    Neurological: Cervical lumbar radiculopathy  Psychiatric: Appropriate mood and affect.   Obesity  Assessment/Plan   Problem List Items Addressed This Visit          Cardiac and Vasculature    Paroxysmal atrial fibrillation (CMS/HCC)     Pt has history AF, rate &/or rhythm therapy has been considered, Anticoagulation as listed and as appropriate. Atrial fibrillation is very common as we age. Fast ventricular rate or simply rate control strategy leads to poor quality of life with chronic fatigue, dyspnea and lack of endurance. Chadsvasc score has been looked into. Cardiology if managing QT prolongation as appropriate. If palpitations or SOB happens then please notify us. QOL can be maintained as good as we can with proper strategic therapy for AF.Chances of embolism or embolic events remain high without anticoagulation therapy.          Hyperlipemia, mixed       Endocrine/Metabolic    Morbid obesity (CMS/HCC) - Primary        Gastrointestinal and Abdominal    GERD without esophagitis       Mental Health    Schizoaffective disorder, bipolar type (CMS/HCC)     PHQ Mini-Mental psych evaluation         Recurrent major depressive disorder, in partial remission (CMS/HCC)     Depression is chronic and quite common and notorious mental health disorder and it is quite common and widespread, there are several therapeutics available for depression now a days. It is considered as chemical imbalance disorder and with medications , it can be adjusted. There are side effects from SSRI and SNRIs but on long term, they are well tolerated. Please do not feel awkward or shy to contact us if feel tired, sleepy, lack of energy or aloof/ alone. Untreated depression can bring serious consequences including suicidal ideations, mental health counselling is available if need arises. Usually treatment of depression is long lasting therapy with periodic evaluations and follow ups. Pt with depression no longer have to feel frustrated, helpless or isolated.Detailed discussion was carried out.              Neuro    Spinal stenosis of lumbar region without neurogenic claudication     Physical therapy Occupational Therapy refer patient to pain management Dr. Alan            Pulmonary and Pneumonias    COPD mixed type (CMS/HCC)     Pt has moderate to severe COPD. It is progressive disease, use of MDI and proper technique discussed, rinse mouth after inhaled corticosteroids. Notify if progressive dyspnea or cough or lethargy, monitor oxygen saturation if possible with home based pulse oximetry. Avoid hospitalization and call us if any flare ups are about to happen, be sure that annual flu vaccine are uptodate and review need for pneumococcal vaccine. Light to moderate low impact exercises are very helpful and deep breathing  exercises are beneficial in chronic lung diseases.           This patient was seen for my regular monthly visit, nursing evaluations and nursing  notes were reviewed, interim events are reviewed, interim concerns and messages were reviewed as we have communicated with nursing staff.  Any issues with the falls, skin care impairment, declining physical condition are reviewed and noted, diagnosis list were reviewed, list of medications were reviewed, living will related issues were reviewed, overall patient has been doing well, any declining in patient's condition or any change in patient's condition needs to be notified to physician promptly, discussed with nursing staff, if needed would communicate with family.  Patient stays confined here at the facility for long-term care, there are always concerns about long-term care related issues and concerns.  Nursing staff is trying their best to keep patient safe, all sort of measures has been taken to keep patient safe and comfortable.   Skin integrity:  Nursing to monitor skin integrity as patient is at risk for pressure injuries.  Wound care per nursing    See Facility notes for measurements/assessments  Turn and reposition Q 2 hours or more  Air mattress and when up in chair cushion reducing device  Dietician to evaluate and recommend:  Nutritional supplement:  Please monitor skin integrity and other pressure areas  Referral to wound clinic if appropriate:    PLAN:  Pt has been seen for follow up visit.  The patient is here at facility for PT/OT/ST to maximize strength, function, endurance and safety.  The patient is participating in therapy. Gray Kim is making progress to the best of his/her ability.   Please see PT/OT/ST notes in the facility for detailed information regarding progression of patients progress.   Recent nursing evaluation and notes were reviewed.   Overall, patient is stable despite his/her chronic conditions.    #  Any decline or change in condition needs to be communicated with the physician or myself.    Discussion with nursing staff regarding ongoing care and management.  If needed,  would communicate with family who are not present at this time.   There are no concerns at this time.  We will continue with the medications noted above.    We will continue to follow the patient here at the facility.    Discharge planning:   Discharge Home when goals are met.   Follow up with PCP in 1-2 weeks after discharge from facility.   C to follow after discharge for continued PT/OT and Nursing.    *Please note that nursing facility, outside laboratory agency, and  [facility] do not interface.     Completion of the note was done through Dragon voice recognition technology and may include   unintended or grammatical errors which may not have been recognized when finalizing the note.     Time:    Gil Disla MD

## 2023-08-23 ENCOUNTER — NURSING HOME VISIT (OUTPATIENT)
Dept: POST ACUTE CARE | Facility: EXTERNAL LOCATION | Age: 63
End: 2023-08-23
Payer: COMMERCIAL

## 2023-08-23 DIAGNOSIS — M48.061 SPINAL STENOSIS OF LUMBAR REGION WITHOUT NEUROGENIC CLAUDICATION: ICD-10-CM

## 2023-08-23 DIAGNOSIS — F25.0 SCHIZOAFFECTIVE DISORDER, BIPOLAR TYPE (MULTI): ICD-10-CM

## 2023-08-23 DIAGNOSIS — K21.9 GERD WITHOUT ESOPHAGITIS: ICD-10-CM

## 2023-08-23 DIAGNOSIS — J44.9 COPD MIXED TYPE (MULTI): Primary | ICD-10-CM

## 2023-08-23 DIAGNOSIS — F33.41 RECURRENT MAJOR DEPRESSIVE DISORDER, IN PARTIAL REMISSION (CMS-HCC): ICD-10-CM

## 2023-08-23 DIAGNOSIS — I48.0 PAROXYSMAL ATRIAL FIBRILLATION (MULTI): ICD-10-CM

## 2023-08-23 DIAGNOSIS — E78.2 HYPERLIPEMIA, MIXED: ICD-10-CM

## 2023-08-23 DIAGNOSIS — E66.01 MORBID OBESITY (MULTI): ICD-10-CM

## 2023-08-23 PROBLEM — F32.A DEPRESSION: Status: ACTIVE | Noted: 2023-08-23

## 2023-08-23 PROBLEM — G62.9 PERIPHERAL NEUROPATHY: Status: ACTIVE | Noted: 2023-08-23

## 2023-08-23 PROBLEM — S42.202G CLOSED FRACTURE OF PROXIMAL END OF LEFT HUMERUS WITH DELAYED HEALING: Status: ACTIVE | Noted: 2023-08-23

## 2023-08-23 PROCEDURE — 99309 SBSQ NF CARE MODERATE MDM 30: CPT | Performed by: INTERNAL MEDICINE

## 2023-08-23 NOTE — PROGRESS NOTES
Name: Gray Kim  YOB: 1960    FOLLOW UP VISIT:   Allergies: Aspirin, Naproxen, Nicotine, Penicillin, Simvastatin, NSAIDs   Code Status: CPR/FULL CODE    Special Instructions:    Diet: Regular diet, Regular texture, Regular consistency   Diagnosis: CHRONIC OBSTRUCTIVE PULMONARY DISEASE, UNSPECIFIED  SUBJECTIVE:    62-year-old patient who had a history of COPD mental health problem complains of cough congestion shortness of breath decreased ADL mobility arthralgia myalgia fatigue tired weakness moderate back pain knee pain advised continue gabapentin tramadol meloxicam Mylanta nicotine patch Zyprexa inhaler [facility].1 carbon dioxide 36    REVIEW OF SYSTEMS: Arthralgia myalgia fatigue tired anxiety insomnia  All review of systems are negative unless otherwise stated above under subjective.    LABS REVIEWED AT FACILITY: Anemia dehydration    MEDICATIONS REVIEWED AT FACILITY:   Order Order Status Revised Last Ordered    Atorvastatin Calcium Oral Tablet 40 MG (Atorvastatin Calcium) Active 7/12/2023 7/12/2023    Cold Sore Products External Ointment (Cold Sore Products) Active 1/13/2023     Cholecalciferol Oral Tablet 50 MCG (2000 UT) (Cholecalciferol) Active 1/14/2023   This order is potentially duplicated by other orders on the chart. Click to view details.    Loratadine Oral Tablet 10 MG (Loratadine) Active 1/14/2023     Ferrous Sulfate Oral Tablet 325 (65 Fe) MG (Ferrous Sulfate) Active 1/14/2023     Omeprazole Oral Capsule Delayed Release 20 MG (Omeprazole) Active 7/11/2023 7/11/2023  This order is potentially duplicated by other orders on the chart. Click to view details.    Saline Mist Spray Nasal Solution 0.65 % (Saline) Active 1/13/2023     guaiFENesin Oral Liquid 100 MG/5ML (Guaifenesin) Active 1/18/2023     Rivaroxaban Oral Tablet 20 MG (Rivaroxaban) Active 7/12/2023 7/12/2023    traZODone HCl Oral Tablet 100 MG (Trazodone  HCl) Active 7/12/2023 7/12/2023    Divalproex Sodium Oral Tablet Delayed Release 125 MG (Divalproex Sodium) Active 7/15/2023 7/15/2023    Artificial Tears Ophthalmic Solution 0.5-0.6 % (Polyvinyl Alcohol-Povidone (Ophth)) Active 1/16/2023     Ammonium Lactate External Lotion 12 % (Lactic Acid (Ammonium Lactate)) Active 1/20/2023     busPIRone HCl Oral Tablet 10 MG (Buspirone HCl) Active 7/12/2023 7/12/2023  This order is potentially duplicated by other orders on the chart. Click to view details.    Simethicone Oral Tablet Chewable 80 MG (Simethicone) Active 2/12/2023 2/10/2023  This order is potentially duplicated by other orders on the chart. Click to view details.    hydrOXYzine HCl Oral Tablet 25 MG (Hydroxyzine HCl) Active 7/12/2023 7/12/2023    Bisacodyl Oral Tablet Delayed Release 5 MG (Bisacodyl) Active 3/23/2023 3/23/2023    Lidocaine Pain Relief External Patch 4 % (Lidocaine) Active 6/9/2023 4/10/2023    Docusate Sodium Oral Capsule 100 MG (Docusate Sodium) Active 4/16/2023     Polyethylene Glycol 3350 Powder (Polyethylene Glycol 3350 (Bulk)) Active 4/16/2023     Lasix Oral Tablet 20 MG (Furosemide) Active 7/16/2023 7/16/2023  This order is potentially duplicated by other orders on the chart. Click to view details.    Saline Nasal Spray Solution (Saline) Active 5/5/2023 5/5/2023    Acetaminophen Oral Tablet 500 MG (Acetaminophen) Active 5/18/2023   This order is potentially duplicated by other orders on the chart. Click to view details.    Biofreeze External Gel 4 % (Menthol (Topical Analgesic)) Active 5/25/2023 5/19/2023    Fluticasone Propionate Nasal Suspension 50 MCG/ACT (Fluticasone Propionate (Nasal)) Active 5/28/2023     Albuterol Sulfate  (90 Base) MCG/ACT Aerosol, solution Active 6/5/2023 6/1/2023    ZyPREXA Oral Tablet 10 MG (Olanzapine) Active 6/10/2023 6/10/2023  This order is potentially duplicated by other orders on the chart. Click to view details.    Nicotine Patch 24 Hour 21  MG/24HR Active 6/23/2023   This order is potentially duplicated by other orders on the chart. Click to view details.    Nicotine Transdermal Patch 24 Hour 14 MG/24HR (Nicotine) Active 6/22/2023   This order is potentially duplicated by other orders on the chart. Click to view details.    Nicotine Transdermal Patch 24 Hour 7 MG/24HR (Nicotine) Active 6/22/2023   This order is potentially duplicated by other orders on the chart. Click to view details.    Mylanta Suspension 200-200-20 MG/5ML (Alum & Mag Hydroxide-Simeth) Active 7/14/2023 7/12/2023    Gabapentin Oral Tablet 600 MG (Gabapentin) Active 7/16/2023 7/16/2023    Meloxicam Tablet 7.5 MG Active 7/16/2023 7/16/2023  This order is potentially duplicated by other orders on the chart. Click to view details.    traMADol HCl Oral Tablet 50 MG (Tramadol HCl)  Living will related issues reviewed-Code status:     OBJECTIVE: ICD-10 Short Description  1/13/2023    J44.9 CHRONIC OBSTRUCTIVE PULMONARY DISEASE, UNSPECIFIED  1/13/2023    F25.9 SCHIZOAFFECTIVE DISORDER, UNSPECIFIED  1/13/2023    F31.9 BIPOLAR DISORDER, UNSPECIFIED  1/13/2023    F41.9 ANXIETY DISORDER, UNSPECIFIED  1/13/2023    I10 ESSENTIAL (PRIMARY) HYPERTENSION  1/13/2023    K21.9 GASTRO-ESOPHAGEAL REFLUX DISEASE WITHOUT ESOPHAGITIS  1/13/2023    G47.33 OBSTRUCTIVE SLEEP APNEA (ADULT) (PEDIATRIC  1/13/2023    I51.7 CARDIOMEGALY  1/13/2023    R26.2 DIFFICULTY IN WALKING, NOT ELSEWHERE CLASSIFIED  1/13/2023    M62.81 MUSCLE WEAKNESS (GENERALIZED  1/13/2023    E66.01 MORBID (SEVERE) OBESITY DUE TO EXCESS CALORIES  1/13/2023    F11.90 OPIOID USE, UNSPECIFIED, UNCOMPLICATED  1/13/2023    F29 UNSP PSYCHOSIS NOT DUE TO A SUBSTANCE OR KNOWN PHYSIOL COND  1/13/2023    D64.9 ANEMIA, UNSPECIFIED  1/13/2023    M47.9 SPONDYLOSIS, UNSPECIFIED  1/13/2023    E87.5 HYPERKALEMIA  1/13/2023    Z86.73 PRSNL HX OF TIA (TIA), 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  Physical Exam    Current Vitals     BP: 145/92 mmHg  8/14/2023 15:58  Temp:97.8 °F  8/14/2023 15:58  Pulse:97 bpm  8/14/2023 15:58    Weight:309.2 Lbs  8/2/2023 10:40  Resp:20 Breaths/min  8/14/2023 15:58  BS:97 mg/dL  2/3/2023 07:34  O2:95 %  8/23/2023 10:25  Pain:0  8/23/2023 12:09    Appearance: Obesity  Skin: Intact,  dry skin, no lesions, rash, petechiae or purpura.     Eyes: PERRLA, EOMs intact,  Conjunctiva pink with no redness or exudates. Cornea & anterior chamber are clear, Eyelids without lesions. No scleral icterus.     ENT: Sinus congestion sore throat  Neck: JVD carotid bruit no lymphadenopathy.    Pulmonary: Decreased air entry crackles rales rhonchi systolic heart murmur. No accessory muscle use or stridor.    Cardiac: Systolic heart murmur carotids without bruits.    Abdomen: Epigastric tenderness y.  No rebound or guarding.  No CVA tenderness.    Genitourinary: Exam deferred.    Musculoskeletal: Arthritis of the spine both hip and both knee. No cyanosis, clubbing, or edema.    Neurological: Cervical lumbar radiculopathy  Psychiatric: Appropriate mood and affect.   Obesity  Assessment/Plan   Problem List Items Addressed This Visit       COPD mixed type (CMS/HCC) - Primary     Pt has moderate to severe COPD. It is progressive disease, use of MDI and proper technique discussed, rinse mouth after inhaled corticosteroids. Notify if progressive dyspnea or cough or lethargy, monitor oxygen saturation if possible with home based pulse oximetry. Avoid hospitalization and call us if any flare ups are about to happen, be sure that annual flu vaccine are uptodate and review need for pneumococcal vaccine. Light to moderate low impact exercises are very helpful and deep breathing  exercises are beneficial in chronic lung diseases.          Schizoaffective disorder, bipolar type (CMS/HCC)    Spinal stenosis of  lumbar region without neurogenic claudication    Paroxysmal atrial fibrillation (CMS/HCC)     Pt has history AF, rate &/or rhythm therapy has been considered, Anticoagulation as listed and as appropriate. Atrial fibrillation is very common as we age. Fast ventricular rate or simply rate control strategy leads to poor quality of life with chronic fatigue, dyspnea and lack of endurance. Chadsvasc score has been looked into. Cardiology if managing QT prolongation as appropriate. If palpitations or SOB happens then please notify us. QOL can be maintained as good as we can with proper strategic therapy for AF.Chances of embolism or embolic events remain high without anticoagulation therapy.          GERD without esophagitis    Hyperlipemia, mixed    Recurrent major depressive disorder, in partial remission (CMS/HCC)     Depression is chronic and quite common and notorious mental health disorder and it is quite common and widespread, there are several therapeutics available for depression now a days. It is considered as chemical imbalance disorder and with medications , it can be adjusted. There are side effects from SSRI and SNRIs but on long term, they are well tolerated. Please do not feel awkward or shy to contact us if feel tired, sleepy, lack of energy or aloof/ alone. Untreated depression can bring serious consequences including suicidal ideations, mental health counselling is available if need arises. Usually treatment of depression is long lasting therapy with periodic evaluations and follow ups. Pt with depression no longer have to feel frustrated, helpless or isolated.Detailed discussion was carried out.           Morbid obesity (CMS/HCC)   This patient was seen for my regular monthly visit, nursing evaluations and nursing notes were reviewed, interim events are reviewed, interim concerns and messages were reviewed as we have communicated with nursing staff.  Any issues with the falls, skin care impairment,  declining physical condition are reviewed and noted, diagnosis list were reviewed, list of medications were reviewed, living will related issues were reviewed, overall patient has been doing well, any declining in patient's condition or any change in patient's condition needs to be notified to physician promptly, discussed with nursing staff, if needed would communicate with family.  Patient stays confined here at the facility for long-term care, there are always concerns about long-term care related issues and concerns.  Nursing staff is trying their best to keep patient safe, all sort of measures has been taken to keep patient safe and comfortable.   Skin integrity:  Nursing to monitor skin integrity as patient is at risk for pressure injuries.  Wound care per nursing    See Facility notes for measurements/assessments  Turn and reposition Q 2 hours or more  Air mattress and when up in chair cushion reducing device  Dietician to evaluate and recommend:  Nutritional supplement:  Please monitor skin integrity and other pressure areas  Referral to wound clinic if appropriate:    PLAN:  Pt has been seen for follow up visit.  The patient is here at facility for PT/OT/ST to maximize strength, function, endurance and safety.  The patient is participating in therapy. Gray Kim is making progress to the best of his/her ability.   Please see PT/OT/ST notes in the facility for detailed information regarding progression of patients progress.   Recent nursing evaluation and notes were reviewed.   Overall, patient is stable despite his/her chronic conditions.    #  Any decline or change in condition needs to be communicated with the physician or myself.    Discussion with nursing staff regarding ongoing care and management.  If needed, would communicate with family who are not present at this time.   There are no concerns at this time.  We will continue with the medications noted above.    We will continue to follow the  patient here at the facility.    Discharge planning:   Discharge Home when goals are met.   Follow up with PCP in 1-2 weeks after discharge from facility.   [facility]HC to follow after discharge for continued PT/OT and Nursing.    *Please note that nursing facility, outside laboratory agency, and  [facility] do not interface.     Completion of the note was done through Dragon voice recognition technology and may include   unintended or grammatical errors which may not have been recognized when finalizing the note.     Time:    Gil Disla MD

## 2023-08-23 NOTE — LETTER
Patient: Gray Kim  : 1960    Encounter Date: 2023    Name: Gray Kim  YOB: 1960    FOLLOW UP VISIT:   Allergies: Aspirin, Naproxen, Nicotine, Penicillin, Simvastatin, NSAIDs   Code Status: CPR/FULL CODE    Special Instructions:    Diet: Regular diet, Regular texture, Regular consistency   Diagnosis: CHRONIC OBSTRUCTIVE PULMONARY DISEASE, UNSPECIFIED  SUBJECTIVE:    62-year-old patient who had a history of COPD mental health problem complains of cough congestion shortness of breath decreased ADL mobility arthralgia myalgia fatigue tired weakness moderate back pain knee pain advised continue gabapentin tramadol meloxicam Mylanta nicotine patch Zyprexa inhaler [facility] 31.1 carbon dioxide 36    REVIEW OF SYSTEMS: Arthralgia myalgia fatigue tired anxiety insomnia  All review of systems are negative unless otherwise stated above under subjective.    LABS REVIEWED AT FACILITY: Anemia dehydration    MEDICATIONS REVIEWED AT FACILITY:   Order Order Status Revised Last Ordered    Atorvastatin Calcium Oral Tablet 40 MG (Atorvastatin Calcium) Active 2023    Cold Sore Products External Ointment (Cold Sore Products) Active 2023     Cholecalciferol Oral Tablet 50 MCG (2000 UT) (Cholecalciferol) Active 2023   This order is potentially duplicated by other orders on the chart. Click to view details.    Loratadine Oral Tablet 10 MG (Loratadine) Active 2023     Ferrous Sulfate Oral Tablet 325 (65 Fe) MG (Ferrous Sulfate) Active 2023     Omeprazole Oral Capsule Delayed Release 20 MG (Omeprazole) Active 2023  This order is potentially duplicated by other orders on the chart. Click to view details.    Saline Mist Spray Nasal Solution 0.65 % (Saline) Active 2023     guaiFENesin Oral Liquid 100 MG/5ML (Guaifenesin) Active 2023     Rivaroxaban Oral Tablet 20 MG  (Rivaroxaban) Active 7/12/2023 7/12/2023    traZODone HCl Oral Tablet 100 MG (Trazodone HCl) Active 7/12/2023 7/12/2023    Divalproex Sodium Oral Tablet Delayed Release 125 MG (Divalproex Sodium) Active 7/15/2023 7/15/2023    Artificial Tears Ophthalmic Solution 0.5-0.6 % (Polyvinyl Alcohol-Povidone (Ophth)) Active 1/16/2023     Ammonium Lactate External Lotion 12 % (Lactic Acid (Ammonium Lactate)) Active 1/20/2023     busPIRone HCl Oral Tablet 10 MG (Buspirone HCl) Active 7/12/2023 7/12/2023  This order is potentially duplicated by other orders on the chart. Click to view details.    Simethicone Oral Tablet Chewable 80 MG (Simethicone) Active 2/12/2023 2/10/2023  This order is potentially duplicated by other orders on the chart. Click to view details.    hydrOXYzine HCl Oral Tablet 25 MG (Hydroxyzine HCl) Active 7/12/2023 7/12/2023    Bisacodyl Oral Tablet Delayed Release 5 MG (Bisacodyl) Active 3/23/2023 3/23/2023    Lidocaine Pain Relief External Patch 4 % (Lidocaine) Active 6/9/2023 4/10/2023    Docusate Sodium Oral Capsule 100 MG (Docusate Sodium) Active 4/16/2023     Polyethylene Glycol 3350 Powder (Polyethylene Glycol 3350 (Bulk)) Active 4/16/2023     Lasix Oral Tablet 20 MG (Furosemide) Active 7/16/2023 7/16/2023  This order is potentially duplicated by other orders on the chart. Click to view details.    Saline Nasal Spray Solution (Saline) Active 5/5/2023 5/5/2023    Acetaminophen Oral Tablet 500 MG (Acetaminophen) Active 5/18/2023   This order is potentially duplicated by other orders on the chart. Click to view details.    Biofreeze External Gel 4 % (Menthol (Topical Analgesic)) Active 5/25/2023 5/19/2023    Fluticasone Propionate Nasal Suspension 50 MCG/ACT (Fluticasone Propionate (Nasal)) Active 5/28/2023     Albuterol Sulfate  (90 Base) MCG/ACT Aerosol, solution Active 6/5/2023 6/1/2023    ZyPREXA Oral Tablet 10 MG (Olanzapine) Active 6/10/2023 6/10/2023  This order is potentially duplicated by  other orders on the chart. Click to view details.    Nicotine Patch 24 Hour 21 MG/24HR Active 6/23/2023   This order is potentially duplicated by other orders on the chart. Click to view details.    Nicotine Transdermal Patch 24 Hour 14 MG/24HR (Nicotine) Active 6/22/2023   This order is potentially duplicated by other orders on the chart. Click to view details.    Nicotine Transdermal Patch 24 Hour 7 MG/24HR (Nicotine) Active 6/22/2023   This order is potentially duplicated by other orders on the chart. Click to view details.    Mylanta Suspension 200-200-20 MG/5ML (Alum & Mag Hydroxide-Simeth) Active 7/14/2023 7/12/2023    Gabapentin Oral Tablet 600 MG (Gabapentin) Active 7/16/2023 7/16/2023    Meloxicam Tablet 7.5 MG Active 7/16/2023 7/16/2023  This order is potentially duplicated by other orders on the chart. Click to view details.    traMADol HCl Oral Tablet 50 MG (Tramadol HCl)  Living will related issues reviewed-Code status:     OBJECTIVE: ICD-10 Short Description  1/13/2023    J44.9 CHRONIC OBSTRUCTIVE PULMONARY DISEASE, UNSPECIFIED  1/13/2023    F25.9 SCHIZOAFFECTIVE DISORDER, UNSPECIFIED  1/13/2023    F31.9 BIPOLAR DISORDER, UNSPECIFIED  1/13/2023    F41.9 ANXIETY DISORDER, UNSPECIFIED  1/13/2023    I10 ESSENTIAL (PRIMARY) HYPERTENSION  1/13/2023    K21.9 GASTRO-ESOPHAGEAL REFLUX DISEASE WITHOUT ESOPHAGITIS  1/13/2023    G47.33 OBSTRUCTIVE SLEEP APNEA (ADULT) (PEDIATRIC  1/13/2023    I51.7 CARDIOMEGALY  1/13/2023    R26.2 DIFFICULTY IN WALKING, NOT ELSEWHERE CLASSIFIED  1/13/2023    M62.81 MUSCLE WEAKNESS (GENERALIZED  1/13/2023    E66.01 MORBID (SEVERE) OBESITY DUE TO EXCESS CALORIES  1/13/2023    F11.90 OPIOID USE, UNSPECIFIED, UNCOMPLICATED  1/13/2023    F29 UNSP PSYCHOSIS NOT DUE TO A SUBSTANCE OR KNOWN PHYSIOL COND  1/13/2023    D64.9 ANEMIA, UNSPECIFIED  1/13/2023    M47.9 SPONDYLOSIS, UNSPECIFIED  1/13/2023    E87.5 HYPERKALEMIA  1/13/2023    Z86.73 PRSNL HX OF TIA (TIA), 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  Physical Exam    Current Vitals     BP: 145/92 mmHg  8/14/2023 15:58  Temp:97.8 °F  8/14/2023 15:58  Pulse:97 bpm  8/14/2023 15:58    Weight:309.2 Lbs  8/2/2023 10:40  Resp:20 Breaths/min  8/14/2023 15:58  BS:97 mg/dL  2/3/2023 07:34  O2:95 %  8/23/2023 10:25  Pain:0  8/23/2023 12:09    Appearance: Obesity  Skin: Intact,  dry skin, no lesions, rash, petechiae or purpura.     Eyes: PERRLA, EOMs intact,  Conjunctiva pink with no redness or exudates. Cornea & anterior chamber are clear, Eyelids without lesions. No scleral icterus.     ENT: Sinus congestion sore throat  Neck: JVD carotid bruit no lymphadenopathy.    Pulmonary: Decreased air entry crackles rales rhonchi systolic heart murmur. No accessory muscle use or stridor.    Cardiac: Systolic heart murmur carotids without bruits.    Abdomen: Epigastric tenderness y.  No rebound or guarding.  No CVA tenderness.    Genitourinary: Exam deferred.    Musculoskeletal: Arthritis of the spine both hip and both knee. No cyanosis, clubbing, or edema.    Neurological: Cervical lumbar radiculopathy  Psychiatric: Appropriate mood and affect.   Obesity  Assessment/Plan  Problem List Items Addressed This Visit       COPD mixed type (CMS/HCC) - Primary     Pt has moderate to severe COPD. It is progressive disease, use of MDI and proper technique discussed, rinse mouth after inhaled corticosteroids. Notify if progressive dyspnea or cough or lethargy, monitor oxygen saturation if possible with home based pulse oximetry. Avoid hospitalization and call us if any flare ups are about to happen, be sure that annual flu vaccine are uptodate and review need for pneumococcal vaccine. Light to moderate low impact exercises are very helpful and deep breathing  exercises are beneficial in chronic lung diseases.           Schizoaffective disorder, bipolar type (CMS/HCC)    Spinal stenosis of lumbar region without neurogenic claudication    Paroxysmal atrial fibrillation (CMS/HCC)     Pt has history AF, rate &/or rhythm therapy has been considered, Anticoagulation as listed and as appropriate. Atrial fibrillation is very common as we age. Fast ventricular rate or simply rate control strategy leads to poor quality of life with chronic fatigue, dyspnea and lack of endurance. Chadsvasc score has been looked into. Cardiology if managing QT prolongation as appropriate. If palpitations or SOB happens then please notify us. QOL can be maintained as good as we can with proper strategic therapy for AF.Chances of embolism or embolic events remain high without anticoagulation therapy.          GERD without esophagitis    Hyperlipemia, mixed    Recurrent major depressive disorder, in partial remission (CMS/HCC)     Depression is chronic and quite common and notorious mental health disorder and it is quite common and widespread, there are several therapeutics available for depression now a days. It is considered as chemical imbalance disorder and with medications , it can be adjusted. There are side effects from SSRI and SNRIs but on long term, they are well tolerated. Please do not feel awkward or shy to contact us if feel tired, sleepy, lack of energy or aloof/ alone. Untreated depression can bring serious consequences including suicidal ideations, mental health counselling is available if need arises. Usually treatment of depression is long lasting therapy with periodic evaluations and follow ups. Pt with depression no longer have to feel frustrated, helpless or isolated.Detailed discussion was carried out.           Morbid obesity (CMS/HCC)   This patient was seen for my regular monthly visit, nursing evaluations and nursing notes were reviewed, interim events are reviewed, interim concerns and messages were reviewed as we have communicated  with nursing staff.  Any issues with the falls, skin care impairment, declining physical condition are reviewed and noted, diagnosis list were reviewed, list of medications were reviewed, living will related issues were reviewed, overall patient has been doing well, any declining in patient's condition or any change in patient's condition needs to be notified to physician promptly, discussed with nursing staff, if needed would communicate with family.  Patient stays confined here at the facility for long-term care, there are always concerns about long-term care related issues and concerns.  Nursing staff is trying their best to keep patient safe, all sort of measures has been taken to keep patient safe and comfortable.   Skin integrity:  Nursing to monitor skin integrity as patient is at risk for pressure injuries.  Wound care per nursing    See Facility notes for measurements/assessments  Turn and reposition Q 2 hours or more  Air mattress and when up in chair cushion reducing device  Dietician to evaluate and recommend:  Nutritional supplement:  Please monitor skin integrity and other pressure areas  Referral to wound clinic if appropriate:    PLAN:  Pt has been seen for follow up visit.  The patient is here at facility for PT/OT/ST to maximize strength, function, endurance and safety.  The patient is participating in therapy. Gray Kim is making progress to the best of his/her ability.   Please see PT/OT/ST notes in the facility for detailed information regarding progression of patients progress.   Recent nursing evaluation and notes were reviewed.   Overall, patient is stable despite his/her chronic conditions.    #  Any decline or change in condition needs to be communicated with the physician or myself.    Discussion with nursing staff regarding ongoing care and management.  If needed, would communicate with family who are not present at this time.   There are no concerns at this time.  We will continue  with the medications noted above.    We will continue to follow the patient here at the facility.    Discharge planning:   Discharge Home when goals are met.   Follow up with PCP in 1-2 weeks after discharge from facility.   C to follow after discharge for continued PT/OT and Nursing.    *Please note that nursing facility, outside laboratory agency, and  [facility] do not interface.     Completion of the note was done through Dragon voice recognition technology and may include   unintended or grammatical errors which may not have been recognized when finalizing the note.     Time:    Gil Disla MD         Electronically Signed By: Gil Disla MD   8/23/23  4:31 PM

## 2023-09-27 ENCOUNTER — NURSING HOME VISIT (OUTPATIENT)
Dept: POST ACUTE CARE | Facility: EXTERNAL LOCATION | Age: 63
End: 2023-09-27
Payer: COMMERCIAL

## 2023-09-27 DIAGNOSIS — M48.061 SPINAL STENOSIS OF LUMBAR REGION WITHOUT NEUROGENIC CLAUDICATION: ICD-10-CM

## 2023-09-27 DIAGNOSIS — F25.0 SCHIZOAFFECTIVE DISORDER, BIPOLAR TYPE (MULTI): ICD-10-CM

## 2023-09-27 DIAGNOSIS — G45.9 TIA (TRANSIENT ISCHEMIC ATTACK): ICD-10-CM

## 2023-09-27 DIAGNOSIS — K21.9 GERD WITHOUT ESOPHAGITIS: ICD-10-CM

## 2023-09-27 DIAGNOSIS — F33.41 RECURRENT MAJOR DEPRESSIVE DISORDER, IN PARTIAL REMISSION (CMS-HCC): ICD-10-CM

## 2023-09-27 DIAGNOSIS — I48.0 PAROXYSMAL ATRIAL FIBRILLATION (MULTI): ICD-10-CM

## 2023-09-27 DIAGNOSIS — J44.9 COPD MIXED TYPE (MULTI): Primary | ICD-10-CM

## 2023-09-27 DIAGNOSIS — E78.2 HYPERLIPEMIA, MIXED: ICD-10-CM

## 2023-09-27 PROCEDURE — 99308 SBSQ NF CARE LOW MDM 20: CPT | Performed by: INTERNAL MEDICINE

## 2023-09-27 NOTE — LETTER
Patient: Gray Kim  : 1960    Encounter Date: 2023    Name: Gray Kim  YOB: 1960    FOLLOW UP VISIT:   Allergies: Aspirin, Naproxen, Nicotine, Penicillin, Simvastatin, NSAIDs   Code Status: CPR/FULL CODE    Special Instructions:    Diet: Regular diet, Regular texture, Regular consistency   Diagnosis: CHRONIC OBSTRUCTIVE PULMONARY DISEASE, UNSPECIFIED  SUBJECTIVE:    62-year-old patient who had a history of COPD mental health problem complains of cough congestion shortness of breath decreased ADL mobility arthralgia myalgia fatigue tired weakness moderate back pain knee pain advised continue gabapentin tramadol meloxicam  Sodium 135 CO2 34  BUN 24 T3 26.9 TSH normal WBC 4.55 MCHC 31.1 carbon dioxide 36 iron is normal hemoglobin A1c is normal    REVIEW OF SYSTEMS: Arthralgia myalgia fatigue tired anxiety insomnia  All review of systems are negative unless otherwise stated above under subjective.    LABS REVIEWED AT FACILITY: Anemia dehydration    MEDICATIONS REVIEWED AT FACILITY:   Order Order Status Revised Last Ordered    Atorvastatin Calcium Oral Tablet 40 MG (Atorvastatin Calcium) Active 2023    Cold Sore Products External Ointment (Cold Sore Products) Active 2023     Cholecalciferol Oral Tablet 50 MCG (2000 UT) (Cholecalciferol) Active 2023   This order is potentially duplicated by other orders on the chart. Click to view details.    Loratadine Oral Tablet 10 MG (Loratadine) Active 2023     Ferrous Sulfate Oral Tablet 325 (65 Fe) MG (Ferrous Sulfate) Active 2023     Omeprazole Oral Capsule Delayed Release 20 MG (Omeprazole) Active 2023  This order is potentially duplicated by other orders on the chart. Click to view details.    Saline Mist Spray Nasal Solution 0.65 % (Saline) Active 2023     guaiFENesin Oral Liquid 100 MG/5ML (Guaifenesin) Active 2023     Rivaroxaban Oral Tablet 20 MG  (Rivaroxaban) Active 7/12/2023 7/12/2023    traZODone HCl Oral Tablet 100 MG (Trazodone HCl) Active 7/12/2023 7/12/2023    Divalproex Sodium Oral Tablet Delayed Release 125 MG (Divalproex Sodium) Active 7/15/2023 7/15/2023    Artificial Tears Ophthalmic Solution 0.5-0.6 % (Polyvinyl Alcohol-Povidone (Ophth)) Active 1/16/2023     Ammonium Lactate External Lotion 12 % (Lactic Acid (Ammonium Lactate)) Active 1/20/2023     busPIRone HCl Oral Tablet 10 MG (Buspirone HCl) Active 7/12/2023 7/12/2023  This order is potentially duplicated by other orders on the chart. Click to view details.    Simethicone Oral Tablet Chewable 80 MG (Simethicone) Active 2/12/2023 2/10/2023  This order is potentially duplicated by other orders on the chart. Click to view details.    hydrOXYzine HCl Oral Tablet 25 MG (Hydroxyzine HCl) Active 7/12/2023 7/12/2023    Bisacodyl Oral Tablet Delayed Release 5 MG (Bisacodyl) Active 3/23/2023 3/23/2023    Lidocaine Pain Relief External Patch 4 % (Lidocaine) Active 6/9/2023 4/10/2023    Docusate Sodium Oral Capsule 100 MG (Docusate Sodium) Active 4/16/2023     Polyethylene Glycol 3350 Powder (Polyethylene Glycol 3350 (Bulk)) Active 4/16/2023     Lasix Oral Tablet 20 MG (Furosemide) Active 7/16/2023 7/16/2023  This order is potentially duplicated by other orders on the chart. Click to view details.    Saline Nasal Spray Solution (Saline) Active 5/5/2023 5/5/2023    Acetaminophen Oral Tablet 500 MG (Acetaminophen) Active 5/18/2023   This order is potentially duplicated by other orders on the chart. Click to view details.    Biofreeze External Gel 4 % (Menthol (Topical Analgesic)) Active 5/25/2023 5/19/2023    Fluticasone Propionate Nasal Suspension 50 MCG/ACT (Fluticasone Propionate (Nasal)) Active 5/28/2023     Albuterol Sulfate  (90 Base) MCG/ACT Aerosol, solution Active 6/5/2023 6/1/2023    ZyPREXA Oral Tablet 10 MG (Olanzapine) Active 6/10/2023 6/10/2023  This order is potentially duplicated by  other orders on the chart. Click to view details.    Nicotine Patch 24 Hour 21 MG/24HR Active 6/23/2023   This order is potentially duplicated by other orders on the chart. Click to view details.    Nicotine Transdermal Patch 24 Hour 14 MG/24HR (Nicotine) Active 6/22/2023   This order is potentially duplicated by other orders on the chart. Click to view details.    Nicotine Transdermal Patch 24 Hour 7 MG/24HR (Nicotine) Active 6/22/2023   This order is potentially duplicated by other orders on the chart. Click to view details.    Mylanta Suspension 200-200-20 MG/5ML (Alum & Mag Hydroxide-Simeth) Active 7/14/2023 7/12/2023    Gabapentin Oral Tablet 600 MG (Gabapentin) Active 7/16/2023 7/16/2023    Meloxicam Tablet 7.5 MG Active 7/16/2023 7/16/2023  This order is potentially duplicated by other orders on the chart. Click to view details.    traMADol HCl Oral Tablet 50 MG (Tramadol HCl)  Living will related issues reviewed-Code status:     OBJECTIVE: ICD-10 Short Description  1/13/2023    J44.9 CHRONIC OBSTRUCTIVE PULMONARY DISEASE, UNSPECIFIED  1/13/2023    F25.9 SCHIZOAFFECTIVE DISORDER, UNSPECIFIED  1/13/2023    F31.9 BIPOLAR DISORDER, UNSPECIFIED  1/13/2023    F41.9 ANXIETY DISORDER, UNSPECIFIED  1/13/2023    I10 ESSENTIAL (PRIMARY) HYPERTENSION  1/13/2023    K21.9 GASTRO-ESOPHAGEAL REFLUX DISEASE WITHOUT ESOPHAGITIS  1/13/2023    G47.33 OBSTRUCTIVE SLEEP APNEA (ADULT) (PEDIATRIC  1/13/2023    I51.7 CARDIOMEGALY  1/13/2023    R26.2 DIFFICULTY IN WALKING, NOT ELSEWHERE CLASSIFIED  1/13/2023    M62.81 MUSCLE WEAKNESS (GENERALIZED  1/13/2023    E66.01 MORBID (SEVERE) OBESITY DUE TO EXCESS CALORIES  1/13/2023    F11.90 OPIOID USE, UNSPECIFIED, UNCOMPLICATED  1/13/2023    F29 UNSP PSYCHOSIS NOT DUE TO A SUBSTANCE OR KNOWN PHYSIOL COND  1/13/2023    D64.9 ANEMIA, UNSPECIFIED  1/13/2023    M47.9 SPONDYLOSIS, UNSPECIFIED  1/13/2023    E87.5 HYPERKALEMIA  1/13/2023    Z86.73 PRSNL HX OF TIA (TIA), 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  Physical Exam  Blood pressure 110/74  Heart rate 78  Respiratory rate 18  Temperature 98  Oxygen saturation 96  Not in pain  Appearance: Obesity  Skin: Intact,  dry skin, no lesions, rash, petechiae or purpura.     Eyes: PERRLA, EOMs intact,  Conjunctiva pink with no redness or exudates. Cornea & anterior chamber are clear, Eyelids without lesions. No scleral icterus.     ENT: Sinus congestion sore throat  Neck: JVD carotid bruit no lymphadenopathy.    Pulmonary: Decreased air entry crackles rales rhonchi systolic heart murmur. No accessory muscle use or stridor.    Cardiac: Systolic heart murmur carotids without bruits.    Abdomen: Epigastric tenderness y.  No rebound or guarding.  No CVA tenderness.    Genitourinary: Exam deferred.    Musculoskeletal: Arthritis of the spine both hip and both knee. No cyanosis, clubbing, or edema.    Neurological: Cervical lumbar radiculopathy  Psychiatric: Appropriate mood and affect.   Obesity  Assessment/Plan  Problem List Items Addressed This Visit       COPD mixed type (CMS/HCC) - Primary     Pt has moderate to severe COPD. It is progressive disease, use of MDI and proper technique discussed, rinse mouth after inhaled corticosteroids. Notify if progressive dyspnea or cough or lethargy, monitor oxygen saturation if possible with home based pulse oximetry. Avoid hospitalization and call us if any flare ups are about to happen, be sure that annual flu vaccine are uptodate and review need for pneumococcal vaccine. Light to moderate low impact exercises are very helpful and deep breathing  exercises are beneficial in chronic lung diseases.          Schizoaffective disorder, bipolar type (CMS/HCC)    Spinal stenosis of lumbar region without neurogenic claudication    Paroxysmal atrial fibrillation (CMS/HCC)      Pt has history AF, rate &/or rhythm therapy has been considered, Anticoagulation as listed and as appropriate. Atrial fibrillation is very common as we age. Fast ventricular rate or simply rate control strategy leads to poor quality of life with chronic fatigue, dyspnea and lack of endurance. Chadsvasc score has been looked into. Cardiology if managing QT prolongation as appropriate. If palpitations or SOB happens then please notify us. QOL can be maintained as good as we can with proper strategic therapy for AF.Chances of embolism or embolic events remain high without anticoagulation therapy.          GERD without esophagitis    RESOLVED: Hyperlipemia, mixed    Recurrent major depressive disorder, in partial remission (CMS/HCC)     Depression is chronic and quite common and notorious mental health disorder and it is quite common and widespread, there are several therapeutics available for depression now a days. It is considered as chemical imbalance disorder and with medications , it can be adjusted. There are side effects from SSRI and SNRIs but on long term, they are well tolerated. Please do not feel awkward or shy to contact us if feel tired, sleepy, lack of energy or aloof/ alone. Untreated depression can bring serious consequences including suicidal ideations, mental health counselling is available if need arises. Usually treatment of depression is long lasting therapy with periodic evaluations and follow ups. Pt with depression no longer have to feel frustrated, helpless or isolated.Detailed discussion was carried out.           TIA (transient ischemic attack)   This patient was seen for my regular monthly visit, nursing evaluations and nursing notes were reviewed, interim events are reviewed, interim concerns and messages were reviewed as we have communicated with nursing staff.  Any issues with the falls, skin care impairment, declining physical condition are reviewed and noted, diagnosis list were  reviewed, list of medications were reviewed, living will related issues were reviewed, overall patient has been doing well, any declining in patient's condition or any change in patient's condition needs to be notified to physician promptly, discussed with nursing staff, if needed would communicate with family.  Patient stays confined here at the facility for long-term care, there are always concerns about long-term care related issues and concerns.  Nursing staff is trying their best to keep patient safe, all sort of measures has been taken to keep patient safe and comfortable.   Skin integrity:  Nursing to monitor skin integrity as patient is at risk for pressure injuries.  Wound care per nursing    See Facility notes for measurements/assessments  Turn and reposition Q 2 hours or more  Air mattress and when up in chair cushion reducing device  Dietician to evaluate and recommend:  Nutritional supplement:  Please monitor skin integrity and other pressure areas  Referral to wound clinic if appropriate:    PLAN:  Pt has been seen for follow up visit.  The patient is here at facility for PT/OT/ST to maximize strength, function, endurance and safety.  The patient is participating in therapy. Gray Kim is making progress to the best of his/her ability.   Please see PT/OT/ST notes in the facility for detailed information regarding progression of patients progress.   Recent nursing evaluation and notes were reviewed.   Overall, patient is stable despite his/her chronic conditions.    #  Any decline or change in condition needs to be communicated with the physician or myself.    Discussion with nursing staff regarding ongoing care and management.  If needed, would communicate with family who are not present at this time.   There are no concerns at this time.  We will continue with the medications noted above.    We will continue to follow the patient here at the facility.    Discharge planning:   Discharge Home when  goals are met.   Follow up with PCP in 1-2 weeks after discharge from facility.   [facility]HC to follow after discharge for continued PT/OT and Nursing.    *Please note that nursing facility, outside laboratory agency, and  [facility] do not interface.     Completion of the note was done through Dragon voice recognition technology and may include   unintended or grammatical errors which may not have been recognized when finalizing the note.     Time:    Gil Disla MD         Electronically Signed By: Gil Disla MD   11/4/23  1:49 PM

## 2023-10-29 ENCOUNTER — NURSING HOME VISIT (OUTPATIENT)
Dept: POST ACUTE CARE | Facility: EXTERNAL LOCATION | Age: 63
End: 2023-10-29
Payer: COMMERCIAL

## 2023-10-29 DIAGNOSIS — I48.0 PAROXYSMAL ATRIAL FIBRILLATION (MULTI): ICD-10-CM

## 2023-10-29 DIAGNOSIS — K21.9 GERD WITHOUT ESOPHAGITIS: ICD-10-CM

## 2023-10-29 DIAGNOSIS — F33.41 RECURRENT MAJOR DEPRESSIVE DISORDER, IN PARTIAL REMISSION (CMS-HCC): ICD-10-CM

## 2023-10-29 DIAGNOSIS — M48.061 SPINAL STENOSIS OF LUMBAR REGION WITHOUT NEUROGENIC CLAUDICATION: ICD-10-CM

## 2023-10-29 DIAGNOSIS — F25.0 SCHIZOAFFECTIVE DISORDER, BIPOLAR TYPE (MULTI): ICD-10-CM

## 2023-10-29 DIAGNOSIS — J44.9 COPD MIXED TYPE (MULTI): Primary | ICD-10-CM

## 2023-10-29 PROCEDURE — 99308 SBSQ NF CARE LOW MDM 20: CPT | Performed by: INTERNAL MEDICINE

## 2023-10-29 NOTE — LETTER
Patient: Gray Kim  : 1960    Encounter Date: 10/29/2023    Name: Gray Kim  YOB: 1960    FOLLOW UP VISIT:   Allergies: Aspirin, Naproxen, Nicotine, Penicillin, Simvastatin, NSAIDs   Code Status: CPR/FULL CODE    Special Instructions:    Diet: Regular diet, Regular texture, Regular consistency   Diagnosis: CHRONIC OBSTRUCTIVE PULMONARY DISEASE, UNSPECIFIED  SUBJECTIVE:    62-year-old patient who had a history of COPD mental health problem complains of cough congestion shortness of breath decreased ADL mobility arthralgia myalgia fatigue tired weakness moderate back pain knee pain advised continue gabapentin tramadol meloxicam  Sodium 135 CO2 34  BUN 24 T3 26.9 TSH normal WBC 4.55 MCHC 31.1 carbon dioxide 36 iron is normal hemoglobin A1c is normal    REVIEW OF SYSTEMS: Arthralgia myalgia fatigue tired anxiety insomnia  All review of systems are negative unless otherwise stated above under subjective.    LABS REVIEWED AT FACILITY: Anemia dehydration    MEDICATIONS REVIEWED AT FACILITY:   Order Order Status Revised Last Ordered    Atorvastatin Calcium Oral Tablet 40 MG (Atorvastatin Calcium) Active 2023    Cold Sore Products External Ointment (Cold Sore Products) Active 2023     Cholecalciferol Oral Tablet 50 MCG (2000 UT) (Cholecalciferol) Active 2023   This order is potentially duplicated by other orders on the chart. Click to view details.    Loratadine Oral Tablet 10 MG (Loratadine) Active 2023     Ferrous Sulfate Oral Tablet 325 (65 Fe) MG (Ferrous Sulfate) Active 2023     Omeprazole Oral Capsule Delayed Release 20 MG (Omeprazole) Active 2023  This order is potentially duplicated by other orders on the chart. Click to view details.    Saline Mist Spray Nasal Solution 0.65 % (Saline) Active 2023     guaiFENesin Oral Liquid 100 MG/5ML (Guaifenesin) Active 2023     Rivaroxaban Oral Tablet 20 MG  (Rivaroxaban) Active 7/12/2023 7/12/2023    traZODone HCl Oral Tablet 100 MG (Trazodone HCl) Active 7/12/2023 7/12/2023    Divalproex Sodium Oral Tablet Delayed Release 125 MG (Divalproex Sodium) Active 7/15/2023 7/15/2023    Artificial Tears Ophthalmic Solution 0.5-0.6 % (Polyvinyl Alcohol-Povidone (Ophth)) Active 1/16/2023     Ammonium Lactate External Lotion 12 % (Lactic Acid (Ammonium Lactate)) Active 1/20/2023     busPIRone HCl Oral Tablet 10 MG (Buspirone HCl) Active 7/12/2023 7/12/2023  This order is potentially duplicated by other orders on the chart. Click to view details.    Simethicone Oral Tablet Chewable 80 MG (Simethicone) Active 2/12/2023 2/10/2023  This order is potentially duplicated by other orders on the chart. Click to view details.    hydrOXYzine HCl Oral Tablet 25 MG (Hydroxyzine HCl) Active 7/12/2023 7/12/2023    Bisacodyl Oral Tablet Delayed Release 5 MG (Bisacodyl) Active 3/23/2023 3/23/2023    Lidocaine Pain Relief External Patch 4 % (Lidocaine) Active 6/9/2023 4/10/2023    Docusate Sodium Oral Capsule 100 MG (Docusate Sodium) Active 4/16/2023     Polyethylene Glycol 3350 Powder (Polyethylene Glycol 3350 (Bulk)) Active 4/16/2023     Lasix Oral Tablet 20 MG (Furosemide) Active 7/16/2023 7/16/2023  This order is potentially duplicated by other orders on the chart. Click to view details.    Saline Nasal Spray Solution (Saline) Active 5/5/2023 5/5/2023    Acetaminophen Oral Tablet 500 MG (Acetaminophen) Active 5/18/2023   This order is potentially duplicated by other orders on the chart. Click to view details.    Biofreeze External Gel 4 % (Menthol (Topical Analgesic)) Active 5/25/2023 5/19/2023    Fluticasone Propionate Nasal Suspension 50 MCG/ACT (Fluticasone Propionate (Nasal)) Active 5/28/2023     Albuterol Sulfate  (90 Base) MCG/ACT Aerosol, solution Active 6/5/2023 6/1/2023    ZyPREXA Oral Tablet 10 MG (Olanzapine) Active 6/10/2023 6/10/2023  This order is potentially duplicated by  other orders on the chart. Click to view details.    Nicotine Patch 24 Hour 21 MG/24HR Active 6/23/2023   This order is potentially duplicated by other orders on the chart. Click to view details.    Nicotine Transdermal Patch 24 Hour 14 MG/24HR (Nicotine) Active 6/22/2023   This order is potentially duplicated by other orders on the chart. Click to view details.    Nicotine Transdermal Patch 24 Hour 7 MG/24HR (Nicotine) Active 6/22/2023   This order is potentially duplicated by other orders on the chart. Click to view details.    Mylanta Suspension 200-200-20 MG/5ML (Alum & Mag Hydroxide-Simeth) Active 7/14/2023 7/12/2023    Gabapentin Oral Tablet 600 MG (Gabapentin) Active 7/16/2023 7/16/2023    Meloxicam Tablet 7.5 MG Active 7/16/2023 7/16/2023  This order is potentially duplicated by other orders on the chart. Click to view details.    traMADol HCl Oral Tablet 50 MG (Tramadol HCl)  Living will related issues reviewed-Code status:     OBJECTIVE: ICD-10 Short Description  1/13/2023    J44.9 CHRONIC OBSTRUCTIVE PULMONARY DISEASE, UNSPECIFIED  1/13/2023    F25.9 SCHIZOAFFECTIVE DISORDER, UNSPECIFIED  1/13/2023    F31.9 BIPOLAR DISORDER, UNSPECIFIED  1/13/2023    F41.9 ANXIETY DISORDER, UNSPECIFIED  1/13/2023    I10 ESSENTIAL (PRIMARY) HYPERTENSION  1/13/2023    K21.9 GASTRO-ESOPHAGEAL REFLUX DISEASE WITHOUT ESOPHAGITIS  1/13/2023    G47.33 OBSTRUCTIVE SLEEP APNEA (ADULT) (PEDIATRIC  1/13/2023    I51.7 CARDIOMEGALY  1/13/2023    R26.2 DIFFICULTY IN WALKING, NOT ELSEWHERE CLASSIFIED  1/13/2023    M62.81 MUSCLE WEAKNESS (GENERALIZED  1/13/2023    E66.01 MORBID (SEVERE) OBESITY DUE TO EXCESS CALORIES  1/13/2023    F11.90 OPIOID USE, UNSPECIFIED, UNCOMPLICATED  1/13/2023    F29 UNSP PSYCHOSIS NOT DUE TO A SUBSTANCE OR KNOWN PHYSIOL COND  1/13/2023    D64.9 ANEMIA, UNSPECIFIED  1/13/2023    M47.9 SPONDYLOSIS, UNSPECIFIED  1/13/2023    E87.5 HYPERKALEMIA  1/13/2023    Z86.73 PRSNL HX OF TIA (TIA), 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  Physical Exam  Blood pressure 132/76  Heart rate 76  Respiratory rate 15  Temperature 98  Oxygen saturation 96  Not in pain  Appearance: Obesity  Skin: Intact,  dry skin, no lesions, rash, petechiae or purpura.     Eyes: [place] sclera eyeglasses    ENT: Sinus congestion sore throat  Neck: JVD carotid bruit no lymphadenopathy.    Pulmonary: Decreased air entry crackles rales rhonchi systolic heart murmur. No accessory muscle use or stridor.    Cardiac: Systolic heart murmur carotids without bruits.    Abdomen: Epigastric tenderness y.  No rebound or guarding.  No CVA tenderness.    Genitourinary: Exam deferred.    Musculoskeletal: Arthritis of the spine both hip and both knee. No cyanosis, clubbing, or edema.    Neurological: Cervical lumbar radiculopathy  Psychiatric: Appropriate mood and affect.   Obesity  Assessment/Plan  Problem List Items Addressed This Visit       COPD mixed type (CMS/HCC) - Primary     Pt has moderate to severe COPD. It is progressive disease, use of MDI and proper technique discussed, rinse mouth after inhaled corticosteroids. Notify if progressive dyspnea or cough or lethargy, monitor oxygen saturation if possible with home based pulse oximetry. Avoid hospitalization and call us if any flare ups are about to happen, be sure that annual flu vaccine are uptodate and review need for pneumococcal vaccine. Light to moderate low impact exercises are very helpful and deep breathing  exercises are beneficial in chronic lung diseases.          Schizoaffective disorder, bipolar type (CMS/HCC)    Spinal stenosis of lumbar region without neurogenic claudication    Paroxysmal atrial fibrillation (CMS/HCC)     Pt has history AF, rate &/or rhythm therapy has been considered, Anticoagulation as listed and as appropriate. Atrial  fibrillation is very common as we age. Fast ventricular rate or simply rate control strategy leads to poor quality of life with chronic fatigue, dyspnea and lack of endurance. Chadsvasc score has been looked into. Cardiology if managing QT prolongation as appropriate. If palpitations or SOB happens then please notify us. QOL can be maintained as good as we can with proper strategic therapy for AF.Chances of embolism or embolic events remain high without anticoagulation therapy.          GERD without esophagitis    Recurrent major depressive disorder, in partial remission (CMS/HCC)     Depression is chronic and quite common and notorious mental health disorder and it is quite common and widespread, there are several therapeutics available for depression now a days. It is considered as chemical imbalance disorder and with medications , it can be adjusted. There are side effects from SSRI and SNRIs but on long term, they are well tolerated. Please do not feel awkward or shy to contact us if feel tired, sleepy, lack of energy or aloof/ alone. Untreated depression can bring serious consequences including suicidal ideations, mental health counselling is available if need arises. Usually treatment of depression is long lasting therapy with periodic evaluations and follow ups. Pt with depression no longer have to feel frustrated, helpless or isolated.Detailed discussion was carried out.          This patient was seen for my regular monthly visit, nursing evaluations and nursing notes were reviewed, interim events are reviewed, interim concerns and messages were reviewed as we have communicated with nursing staff.  Any issues with the falls, skin care impairment, declining physical condition are reviewed and noted, diagnosis list were reviewed, list of medications were reviewed, living will related issues were reviewed, overall patient has been doing well, any declining in patient's condition or any change in patient's  condition needs to be notified to physician promptly, discussed with nursing staff, if needed would communicate with family.  Patient stays confined here at the facility for long-term care, there are always concerns about long-term care related issues and concerns.  Nursing staff is trying their best to keep patient safe, all sort of measures has been taken to keep patient safe and comfortable.   Skin integrity:  Nursing to monitor skin integrity as patient is at risk for pressure injuries.  Wound care per nursing    See Facility notes for measurements/assessments  Turn and reposition Q 2 hours or more  Air mattress and when up in chair cushion reducing device  Dietician to evaluate and recommend:  Nutritional supplement:  Please monitor skin integrity and other pressure areas  Referral to wound clinic if appropriate:    PLAN:  Pt has been seen for follow up visit.  The patient is here at facility for PT/OT/ST to maximize strength, function, endurance and safety.  The patient is participating in therapy. Gray Kim is making progress to the best of his/her ability.   Please see PT/OT/ST notes in the facility for detailed information regarding progression of patients progress.   Recent nursing evaluation and notes were reviewed.   Overall, patient is stable despite his/her chronic conditions.    #  Any decline or change in condition needs to be communicated with the physician or myself.    Discussion with nursing staff regarding ongoing care and management.  If needed, would communicate with family who are not present at this time.   There are no concerns at this time.  We will continue with the medications noted above.    We will continue to follow the patient here at the facility.    Discharge planning:   Discharge Home when goals are met.   Follow up with PCP in 1-2 weeks after discharge from facility.   [facility] to follow after discharge for continued PT/OT and Nursing.    *Please note that nursing facility,  outside laboratory agency, and  [facility] do not interface.     Completion of the note was done through Dragon voice recognition technology and may include   unintended or grammatical errors which may not have been recognized when finalizing the note.     Time:    Gil Disla MD         Electronically Signed By: Gil Disla MD   11/5/23  2:58 PM

## 2023-11-04 PROBLEM — G62.9 PERIPHERAL NEUROPATHY: Status: RESOLVED | Noted: 2023-08-23 | Resolved: 2023-11-04

## 2023-11-04 PROBLEM — M79.7 FIBROMYALGIA: Status: RESOLVED | Noted: 2023-04-05 | Resolved: 2023-11-04

## 2023-11-04 PROBLEM — S42.202G CLOSED FRACTURE OF PROXIMAL END OF LEFT HUMERUS WITH DELAYED HEALING: Status: RESOLVED | Noted: 2023-08-23 | Resolved: 2023-11-04

## 2023-11-04 PROBLEM — E78.2 HYPERLIPEMIA, MIXED: Status: RESOLVED | Noted: 2023-04-05 | Resolved: 2023-11-04

## 2023-11-04 PROBLEM — E66.01 MORBID OBESITY (MULTI): Status: RESOLVED | Noted: 2023-07-18 | Resolved: 2023-11-04

## 2023-11-04 PROBLEM — M54.17 LUMBOSACRAL RADICULOPATHY AT L4: Status: RESOLVED | Noted: 2023-04-05 | Resolved: 2023-11-04

## 2023-11-04 PROBLEM — F32.A DEPRESSION: Status: RESOLVED | Noted: 2023-08-23 | Resolved: 2023-11-04

## 2023-11-04 NOTE — PROGRESS NOTES
Name: Gray Kim  YOB: 1960    FOLLOW UP VISIT:   Allergies: Aspirin, Naproxen, Nicotine, Penicillin, Simvastatin, NSAIDs   Code Status: CPR/FULL CODE    Special Instructions:    Diet: Regular diet, Regular texture, Regular consistency   Diagnosis: CHRONIC OBSTRUCTIVE PULMONARY DISEASE, UNSPECIFIED  SUBJECTIVE:    62-year-old patient who had a history of COPD mental health problem complains of cough congestion shortness of breath decreased ADL mobility arthralgia myalgia fatigue tired weakness moderate back pain knee pain advised continue gabapentin tramadol meloxicam  Sodium 135 CO2 34  BUN 24 T3 26.9 TSH normal WBC 4.55 MCHC 31.1 carbon dioxide 36 iron is normal hemoglobin A1c is normal    REVIEW OF SYSTEMS: Arthralgia myalgia fatigue tired anxiety insomnia  All review of systems are negative unless otherwise stated above under subjective.    LABS REVIEWED AT FACILITY: Anemia dehydration    MEDICATIONS REVIEWED AT FACILITY:   Order Order Status Revised Last Ordered    Atorvastatin Calcium Oral Tablet 40 MG (Atorvastatin Calcium) Active 7/12/2023 7/12/2023    Cold Sore Products External Ointment (Cold Sore Products) Active 1/13/2023     Cholecalciferol Oral Tablet 50 MCG (2000 UT) (Cholecalciferol) Active 1/14/2023   This order is potentially duplicated by other orders on the chart. Click to view details.    Loratadine Oral Tablet 10 MG (Loratadine) Active 1/14/2023     Ferrous Sulfate Oral Tablet 325 (65 Fe) MG (Ferrous Sulfate) Active 1/14/2023     Omeprazole Oral Capsule Delayed Release 20 MG (Omeprazole) Active 7/11/2023 7/11/2023  This order is potentially duplicated by other orders on the chart. Click to view details.    Saline Mist Spray Nasal Solution 0.65 % (Saline) Active 1/13/2023     guaiFENesin Oral Liquid 100 MG/5ML (Guaifenesin) Active 1/18/2023     Rivaroxaban Oral Tablet 20 MG (Rivaroxaban) Active 7/12/2023 7/12/2023    traZODone HCl Oral Tablet 100 MG (Trazodone  HCl) Active 7/12/2023 7/12/2023    Divalproex Sodium Oral Tablet Delayed Release 125 MG (Divalproex Sodium) Active 7/15/2023 7/15/2023    Artificial Tears Ophthalmic Solution 0.5-0.6 % (Polyvinyl Alcohol-Povidone (Ophth)) Active 1/16/2023     Ammonium Lactate External Lotion 12 % (Lactic Acid (Ammonium Lactate)) Active 1/20/2023     busPIRone HCl Oral Tablet 10 MG (Buspirone HCl) Active 7/12/2023 7/12/2023  This order is potentially duplicated by other orders on the chart. Click to view details.    Simethicone Oral Tablet Chewable 80 MG (Simethicone) Active 2/12/2023 2/10/2023  This order is potentially duplicated by other orders on the chart. Click to view details.    hydrOXYzine HCl Oral Tablet 25 MG (Hydroxyzine HCl) Active 7/12/2023 7/12/2023    Bisacodyl Oral Tablet Delayed Release 5 MG (Bisacodyl) Active 3/23/2023 3/23/2023    Lidocaine Pain Relief External Patch 4 % (Lidocaine) Active 6/9/2023 4/10/2023    Docusate Sodium Oral Capsule 100 MG (Docusate Sodium) Active 4/16/2023     Polyethylene Glycol 3350 Powder (Polyethylene Glycol 3350 (Bulk)) Active 4/16/2023     Lasix Oral Tablet 20 MG (Furosemide) Active 7/16/2023 7/16/2023  This order is potentially duplicated by other orders on the chart. Click to view details.    Saline Nasal Spray Solution (Saline) Active 5/5/2023 5/5/2023    Acetaminophen Oral Tablet 500 MG (Acetaminophen) Active 5/18/2023   This order is potentially duplicated by other orders on the chart. Click to view details.    Biofreeze External Gel 4 % (Menthol (Topical Analgesic)) Active 5/25/2023 5/19/2023    Fluticasone Propionate Nasal Suspension 50 MCG/ACT (Fluticasone Propionate (Nasal)) Active 5/28/2023     Albuterol Sulfate  (90 Base) MCG/ACT Aerosol, solution Active 6/5/2023 6/1/2023    ZyPREXA Oral Tablet 10 MG (Olanzapine) Active 6/10/2023 6/10/2023  This order is potentially duplicated by other orders on the chart. Click to view details.    Nicotine Patch 24 Hour 21  MG/24HR Active 6/23/2023   This order is potentially duplicated by other orders on the chart. Click to view details.    Nicotine Transdermal Patch 24 Hour 14 MG/24HR (Nicotine) Active 6/22/2023   This order is potentially duplicated by other orders on the chart. Click to view details.    Nicotine Transdermal Patch 24 Hour 7 MG/24HR (Nicotine) Active 6/22/2023   This order is potentially duplicated by other orders on the chart. Click to view details.    Mylanta Suspension 200-200-20 MG/5ML (Alum & Mag Hydroxide-Simeth) Active 7/14/2023 7/12/2023    Gabapentin Oral Tablet 600 MG (Gabapentin) Active 7/16/2023 7/16/2023    Meloxicam Tablet 7.5 MG Active 7/16/2023 7/16/2023  This order is potentially duplicated by other orders on the chart. Click to view details.    traMADol HCl Oral Tablet 50 MG (Tramadol HCl)  Living will related issues reviewed-Code status:     OBJECTIVE: ICD-10 Short Description  1/13/2023    J44.9 CHRONIC OBSTRUCTIVE PULMONARY DISEASE, UNSPECIFIED  1/13/2023    F25.9 SCHIZOAFFECTIVE DISORDER, UNSPECIFIED  1/13/2023    F31.9 BIPOLAR DISORDER, UNSPECIFIED  1/13/2023    F41.9 ANXIETY DISORDER, UNSPECIFIED  1/13/2023    I10 ESSENTIAL (PRIMARY) HYPERTENSION  1/13/2023    K21.9 GASTRO-ESOPHAGEAL REFLUX DISEASE WITHOUT ESOPHAGITIS  1/13/2023    G47.33 OBSTRUCTIVE SLEEP APNEA (ADULT) (PEDIATRIC  1/13/2023    I51.7 CARDIOMEGALY  1/13/2023    R26.2 DIFFICULTY IN WALKING, NOT ELSEWHERE CLASSIFIED  1/13/2023    M62.81 MUSCLE WEAKNESS (GENERALIZED  1/13/2023    E66.01 MORBID (SEVERE) OBESITY DUE TO EXCESS CALORIES  1/13/2023    F11.90 OPIOID USE, UNSPECIFIED, UNCOMPLICATED  1/13/2023    F29 UNSP PSYCHOSIS NOT DUE TO A SUBSTANCE OR KNOWN PHYSIOL COND  1/13/2023    D64.9 ANEMIA, UNSPECIFIED  1/13/2023    M47.9 SPONDYLOSIS, UNSPECIFIED  1/13/2023    E87.5 HYPERKALEMIA  1/13/2023    Z86.73 PRSNL HX OF TIA (TIA), 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  Physical Exam  Blood pressure 110/74  Heart rate 78  Respiratory rate 18  Temperature 98  Oxygen saturation 96  Not in pain  Appearance: Obesity  Skin: Intact,  dry skin, no lesions, rash, petechiae or purpura.     Eyes: PERRLA, EOMs intact,  Conjunctiva pink with no redness or exudates. Cornea & anterior chamber are clear, Eyelids without lesions. No scleral icterus.     ENT: Sinus congestion sore throat  Neck: JVD carotid bruit no lymphadenopathy.    Pulmonary: Decreased air entry crackles rales rhonchi systolic heart murmur. No accessory muscle use or stridor.    Cardiac: Systolic heart murmur carotids without bruits.    Abdomen: Epigastric tenderness y.  No rebound or guarding.  No CVA tenderness.    Genitourinary: Exam deferred.    Musculoskeletal: Arthritis of the spine both hip and both knee. No cyanosis, clubbing, or edema.    Neurological: Cervical lumbar radiculopathy  Psychiatric: Appropriate mood and affect.   Obesity  Assessment/Plan   Problem List Items Addressed This Visit       COPD mixed type (CMS/HCC) - Primary     Pt has moderate to severe COPD. It is progressive disease, use of MDI and proper technique discussed, rinse mouth after inhaled corticosteroids. Notify if progressive dyspnea or cough or lethargy, monitor oxygen saturation if possible with home based pulse oximetry. Avoid hospitalization and call us if any flare ups are about to happen, be sure that annual flu vaccine are uptodate and review need for pneumococcal vaccine. Light to moderate low impact exercises are very helpful and deep breathing  exercises are beneficial in chronic lung diseases.          Schizoaffective disorder, bipolar type (CMS/HCC)    Spinal stenosis of lumbar region without neurogenic claudication    Paroxysmal atrial fibrillation (CMS/HCC)     Pt has history AF, rate &/or rhythm therapy has been considered,  Anticoagulation as listed and as appropriate. Atrial fibrillation is very common as we age. Fast ventricular rate or simply rate control strategy leads to poor quality of life with chronic fatigue, dyspnea and lack of endurance. Chadsvasc score has been looked into. Cardiology if managing QT prolongation as appropriate. If palpitations or SOB happens then please notify us. QOL can be maintained as good as we can with proper strategic therapy for AF.Chances of embolism or embolic events remain high without anticoagulation therapy.          GERD without esophagitis    RESOLVED: Hyperlipemia, mixed    Recurrent major depressive disorder, in partial remission (CMS/HCC)     Depression is chronic and quite common and notorious mental health disorder and it is quite common and widespread, there are several therapeutics available for depression now a days. It is considered as chemical imbalance disorder and with medications , it can be adjusted. There are side effects from SSRI and SNRIs but on long term, they are well tolerated. Please do not feel awkward or shy to contact us if feel tired, sleepy, lack of energy or aloof/ alone. Untreated depression can bring serious consequences including suicidal ideations, mental health counselling is available if need arises. Usually treatment of depression is long lasting therapy with periodic evaluations and follow ups. Pt with depression no longer have to feel frustrated, helpless or isolated.Detailed discussion was carried out.           TIA (transient ischemic attack)   This patient was seen for my regular monthly visit, nursing evaluations and nursing notes were reviewed, interim events are reviewed, interim concerns and messages were reviewed as we have communicated with nursing staff.  Any issues with the falls, skin care impairment, declining physical condition are reviewed and noted, diagnosis list were reviewed, list of medications were reviewed, living will related issues  were reviewed, overall patient has been doing well, any declining in patient's condition or any change in patient's condition needs to be notified to physician promptly, discussed with nursing staff, if needed would communicate with family.  Patient stays confined here at the facility for long-term care, there are always concerns about long-term care related issues and concerns.  Nursing staff is trying their best to keep patient safe, all sort of measures has been taken to keep patient safe and comfortable.   Skin integrity:  Nursing to monitor skin integrity as patient is at risk for pressure injuries.  Wound care per nursing    See Facility notes for measurements/assessments  Turn and reposition Q 2 hours or more  Air mattress and when up in chair cushion reducing device  Dietician to evaluate and recommend:  Nutritional supplement:  Please monitor skin integrity and other pressure areas  Referral to wound clinic if appropriate:    PLAN:  Pt has been seen for follow up visit.  The patient is here at facility for PT/OT/ST to maximize strength, function, endurance and safety.  The patient is participating in therapy. Gray Kim is making progress to the best of his/her ability.   Please see PT/OT/ST notes in the facility for detailed information regarding progression of patients progress.   Recent nursing evaluation and notes were reviewed.   Overall, patient is stable despite his/her chronic conditions.    #  Any decline or change in condition needs to be communicated with the physician or myself.    Discussion with nursing staff regarding ongoing care and management.  If needed, would communicate with family who are not present at this time.   There are no concerns at this time.  We will continue with the medications noted above.    We will continue to follow the patient here at the facility.    Discharge planning:   Discharge Home when goals are met.   Follow up with PCP in 1-2 weeks after discharge from  facility.   C to follow after discharge for continued PT/OT and Nursing.    *Please note that nursing facility, outside laboratory agency, and  [facility] do not interface.     Completion of the note was done through Dragon voice recognition technology and may include   unintended or grammatical errors which may not have been recognized when finalizing the note.     Time:    Gil Disla MD

## 2023-11-05 NOTE — PROGRESS NOTES
Name: Gray Kim  YOB: 1960    FOLLOW UP VISIT:   Allergies: Aspirin, Naproxen, Nicotine, Penicillin, Simvastatin, NSAIDs   Code Status: CPR/FULL CODE    Special Instructions:    Diet: Regular diet, Regular texture, Regular consistency   Diagnosis: CHRONIC OBSTRUCTIVE PULMONARY DISEASE, UNSPECIFIED  SUBJECTIVE:    62-year-old patient who had a history of COPD mental health problem complains of cough congestion shortness of breath decreased ADL mobility arthralgia myalgia fatigue tired weakness moderate back pain knee pain advised continue gabapentin tramadol meloxicam  Sodium 135 CO2 34  BUN 24 T3 26.9 TSH normal WBC 4.55 MCHC 31.1 carbon dioxide 36 iron is normal hemoglobin A1c is normal    REVIEW OF SYSTEMS: Arthralgia myalgia fatigue tired anxiety insomnia  All review of systems are negative unless otherwise stated above under subjective.    LABS REVIEWED AT FACILITY: Anemia dehydration    MEDICATIONS REVIEWED AT FACILITY:   Order Order Status Revised Last Ordered    Atorvastatin Calcium Oral Tablet 40 MG (Atorvastatin Calcium) Active 7/12/2023 7/12/2023    Cold Sore Products External Ointment (Cold Sore Products) Active 1/13/2023     Cholecalciferol Oral Tablet 50 MCG (2000 UT) (Cholecalciferol) Active 1/14/2023   This order is potentially duplicated by other orders on the chart. Click to view details.    Loratadine Oral Tablet 10 MG (Loratadine) Active 1/14/2023     Ferrous Sulfate Oral Tablet 325 (65 Fe) MG (Ferrous Sulfate) Active 1/14/2023     Omeprazole Oral Capsule Delayed Release 20 MG (Omeprazole) Active 7/11/2023 7/11/2023  This order is potentially duplicated by other orders on the chart. Click to view details.    Saline Mist Spray Nasal Solution 0.65 % (Saline) Active 1/13/2023     guaiFENesin Oral Liquid 100 MG/5ML (Guaifenesin) Active 1/18/2023     Rivaroxaban Oral Tablet 20 MG (Rivaroxaban) Active 7/12/2023 7/12/2023    traZODone HCl Oral Tablet 100 MG (Trazodone  HCl) Active 7/12/2023 7/12/2023    Divalproex Sodium Oral Tablet Delayed Release 125 MG (Divalproex Sodium) Active 7/15/2023 7/15/2023    Artificial Tears Ophthalmic Solution 0.5-0.6 % (Polyvinyl Alcohol-Povidone (Ophth)) Active 1/16/2023     Ammonium Lactate External Lotion 12 % (Lactic Acid (Ammonium Lactate)) Active 1/20/2023     busPIRone HCl Oral Tablet 10 MG (Buspirone HCl) Active 7/12/2023 7/12/2023  This order is potentially duplicated by other orders on the chart. Click to view details.    Simethicone Oral Tablet Chewable 80 MG (Simethicone) Active 2/12/2023 2/10/2023  This order is potentially duplicated by other orders on the chart. Click to view details.    hydrOXYzine HCl Oral Tablet 25 MG (Hydroxyzine HCl) Active 7/12/2023 7/12/2023    Bisacodyl Oral Tablet Delayed Release 5 MG (Bisacodyl) Active 3/23/2023 3/23/2023    Lidocaine Pain Relief External Patch 4 % (Lidocaine) Active 6/9/2023 4/10/2023    Docusate Sodium Oral Capsule 100 MG (Docusate Sodium) Active 4/16/2023     Polyethylene Glycol 3350 Powder (Polyethylene Glycol 3350 (Bulk)) Active 4/16/2023     Lasix Oral Tablet 20 MG (Furosemide) Active 7/16/2023 7/16/2023  This order is potentially duplicated by other orders on the chart. Click to view details.    Saline Nasal Spray Solution (Saline) Active 5/5/2023 5/5/2023    Acetaminophen Oral Tablet 500 MG (Acetaminophen) Active 5/18/2023   This order is potentially duplicated by other orders on the chart. Click to view details.    Biofreeze External Gel 4 % (Menthol (Topical Analgesic)) Active 5/25/2023 5/19/2023    Fluticasone Propionate Nasal Suspension 50 MCG/ACT (Fluticasone Propionate (Nasal)) Active 5/28/2023     Albuterol Sulfate  (90 Base) MCG/ACT Aerosol, solution Active 6/5/2023 6/1/2023    ZyPREXA Oral Tablet 10 MG (Olanzapine) Active 6/10/2023 6/10/2023  This order is potentially duplicated by other orders on the chart. Click to view details.    Nicotine Patch 24 Hour 21  MG/24HR Active 6/23/2023   This order is potentially duplicated by other orders on the chart. Click to view details.    Nicotine Transdermal Patch 24 Hour 14 MG/24HR (Nicotine) Active 6/22/2023   This order is potentially duplicated by other orders on the chart. Click to view details.    Nicotine Transdermal Patch 24 Hour 7 MG/24HR (Nicotine) Active 6/22/2023   This order is potentially duplicated by other orders on the chart. Click to view details.    Mylanta Suspension 200-200-20 MG/5ML (Alum & Mag Hydroxide-Simeth) Active 7/14/2023 7/12/2023    Gabapentin Oral Tablet 600 MG (Gabapentin) Active 7/16/2023 7/16/2023    Meloxicam Tablet 7.5 MG Active 7/16/2023 7/16/2023  This order is potentially duplicated by other orders on the chart. Click to view details.    traMADol HCl Oral Tablet 50 MG (Tramadol HCl)  Living will related issues reviewed-Code status:     OBJECTIVE: ICD-10 Short Description  1/13/2023    J44.9 CHRONIC OBSTRUCTIVE PULMONARY DISEASE, UNSPECIFIED  1/13/2023    F25.9 SCHIZOAFFECTIVE DISORDER, UNSPECIFIED  1/13/2023    F31.9 BIPOLAR DISORDER, UNSPECIFIED  1/13/2023    F41.9 ANXIETY DISORDER, UNSPECIFIED  1/13/2023    I10 ESSENTIAL (PRIMARY) HYPERTENSION  1/13/2023    K21.9 GASTRO-ESOPHAGEAL REFLUX DISEASE WITHOUT ESOPHAGITIS  1/13/2023    G47.33 OBSTRUCTIVE SLEEP APNEA (ADULT) (PEDIATRIC  1/13/2023    I51.7 CARDIOMEGALY  1/13/2023    R26.2 DIFFICULTY IN WALKING, NOT ELSEWHERE CLASSIFIED  1/13/2023    M62.81 MUSCLE WEAKNESS (GENERALIZED  1/13/2023    E66.01 MORBID (SEVERE) OBESITY DUE TO EXCESS CALORIES  1/13/2023    F11.90 OPIOID USE, UNSPECIFIED, UNCOMPLICATED  1/13/2023    F29 UNSP PSYCHOSIS NOT DUE TO A SUBSTANCE OR KNOWN PHYSIOL COND  1/13/2023    D64.9 ANEMIA, UNSPECIFIED  1/13/2023    M47.9 SPONDYLOSIS, UNSPECIFIED  1/13/2023    E87.5 HYPERKALEMIA  1/13/2023    Z86.73 PRSNL HX OF TIA (TIA), 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  Physical Exam  Blood pressure 132/76  Heart rate 76  Respiratory rate 15  Temperature 98  Oxygen saturation 96  Not in pain  Appearance: Obesity  Skin: Intact,  dry skin, no lesions, rash, petechiae or purpura.     Eyes: [place] sclera eyeglasses    ENT: Sinus congestion sore throat  Neck: JVD carotid bruit no lymphadenopathy.    Pulmonary: Decreased air entry crackles rales rhonchi systolic heart murmur. No accessory muscle use or stridor.    Cardiac: Systolic heart murmur carotids without bruits.    Abdomen: Epigastric tenderness y.  No rebound or guarding.  No CVA tenderness.    Genitourinary: Exam deferred.    Musculoskeletal: Arthritis of the spine both hip and both knee. No cyanosis, clubbing, or edema.    Neurological: Cervical lumbar radiculopathy  Psychiatric: Appropriate mood and affect.   Obesity  Assessment/Plan   Problem List Items Addressed This Visit       COPD mixed type (CMS/HCC) - Primary     Pt has moderate to severe COPD. It is progressive disease, use of MDI and proper technique discussed, rinse mouth after inhaled corticosteroids. Notify if progressive dyspnea or cough or lethargy, monitor oxygen saturation if possible with home based pulse oximetry. Avoid hospitalization and call us if any flare ups are about to happen, be sure that annual flu vaccine are uptodate and review need for pneumococcal vaccine. Light to moderate low impact exercises are very helpful and deep breathing  exercises are beneficial in chronic lung diseases.          Schizoaffective disorder, bipolar type (CMS/HCC)    Spinal stenosis of lumbar region without neurogenic claudication    Paroxysmal atrial fibrillation (CMS/HCC)     Pt has history AF, rate &/or rhythm therapy has been considered, Anticoagulation as listed and as appropriate. Atrial fibrillation is very common as we age. Fast ventricular rate or simply rate  control strategy leads to poor quality of life with chronic fatigue, dyspnea and lack of endurance. Chadsvasc score has been looked into. Cardiology if managing QT prolongation as appropriate. If palpitations or SOB happens then please notify us. QOL can be maintained as good as we can with proper strategic therapy for AF.Chances of embolism or embolic events remain high without anticoagulation therapy.          GERD without esophagitis    Recurrent major depressive disorder, in partial remission (CMS/HCC)     Depression is chronic and quite common and notorious mental health disorder and it is quite common and widespread, there are several therapeutics available for depression now a days. It is considered as chemical imbalance disorder and with medications , it can be adjusted. There are side effects from SSRI and SNRIs but on long term, they are well tolerated. Please do not feel awkward or shy to contact us if feel tired, sleepy, lack of energy or aloof/ alone. Untreated depression can bring serious consequences including suicidal ideations, mental health counselling is available if need arises. Usually treatment of depression is long lasting therapy with periodic evaluations and follow ups. Pt with depression no longer have to feel frustrated, helpless or isolated.Detailed discussion was carried out.          This patient was seen for my regular monthly visit, nursing evaluations and nursing notes were reviewed, interim events are reviewed, interim concerns and messages were reviewed as we have communicated with nursing staff.  Any issues with the falls, skin care impairment, declining physical condition are reviewed and noted, diagnosis list were reviewed, list of medications were reviewed, living will related issues were reviewed, overall patient has been doing well, any declining in patient's condition or any change in patient's condition needs to be notified to physician promptly, discussed with nursing  staff, if needed would communicate with family.  Patient stays confined here at the facility for long-term care, there are always concerns about long-term care related issues and concerns.  Nursing staff is trying their best to keep patient safe, all sort of measures has been taken to keep patient safe and comfortable.   Skin integrity:  Nursing to monitor skin integrity as patient is at risk for pressure injuries.  Wound care per nursing    See Facility notes for measurements/assessments  Turn and reposition Q 2 hours or more  Air mattress and when up in chair cushion reducing device  Dietician to evaluate and recommend:  Nutritional supplement:  Please monitor skin integrity and other pressure areas  Referral to wound clinic if appropriate:    PLAN:  Pt has been seen for follow up visit.  The patient is here at facility for PT/OT/ST to maximize strength, function, endurance and safety.  The patient is participating in therapy. Gray Kim is making progress to the best of his/her ability.   Please see PT/OT/ST notes in the facility for detailed information regarding progression of patients progress.   Recent nursing evaluation and notes were reviewed.   Overall, patient is stable despite his/her chronic conditions.    #  Any decline or change in condition needs to be communicated with the physician or myself.    Discussion with nursing staff regarding ongoing care and management.  If needed, would communicate with family who are not present at this time.   There are no concerns at this time.  We will continue with the medications noted above.    We will continue to follow the patient here at the facility.    Discharge planning:   Discharge Home when goals are met.   Follow up with PCP in 1-2 weeks after discharge from facility.   [facility] to follow after discharge for continued PT/OT and Nursing.    *Please note that nursing facility, outside laboratory agency, and  [facility] do not interface.     Completion of the note  was done through Dragon voice recognition technology and may include   unintended or grammatical errors which may not have been recognized when finalizing the note.     Time:    Gil Disla MD

## 2023-11-26 ENCOUNTER — NURSING HOME VISIT (OUTPATIENT)
Dept: POST ACUTE CARE | Facility: EXTERNAL LOCATION | Age: 63
End: 2023-11-26
Payer: COMMERCIAL

## 2023-11-26 DIAGNOSIS — J44.9 COPD MIXED TYPE (MULTI): ICD-10-CM

## 2023-11-26 DIAGNOSIS — H10.13 ALLERGIC CONJUNCTIVITIS OF BOTH EYES: Primary | ICD-10-CM

## 2023-11-26 DIAGNOSIS — I48.0 PAROXYSMAL ATRIAL FIBRILLATION (MULTI): ICD-10-CM

## 2023-11-26 DIAGNOSIS — E78.2 HYPERLIPEMIA, MIXED: ICD-10-CM

## 2023-11-26 DIAGNOSIS — F25.0 SCHIZOAFFECTIVE DISORDER, BIPOLAR TYPE (MULTI): ICD-10-CM

## 2023-11-26 DIAGNOSIS — M48.061 SPINAL STENOSIS OF LUMBAR REGION WITHOUT NEUROGENIC CLAUDICATION: ICD-10-CM

## 2023-11-26 DIAGNOSIS — K21.9 GERD WITHOUT ESOPHAGITIS: ICD-10-CM

## 2023-11-26 PROCEDURE — 99308 SBSQ NF CARE LOW MDM 20: CPT | Performed by: INTERNAL MEDICINE

## 2023-11-26 NOTE — LETTER
Patient: Gray Kim  : 1960    Encounter Date: 2023    Name: Gray Kim  YOB: 1960    FOLLOW UP VISIT:   Allergies: Aspirin, Naproxen, Nicotine, Penicillin, Simvastatin, NSAIDs   Code Status: CPR/FULL CODE    Special Instructions:    Diet: Regular diet, Regular texture, Regular consistency   Diagnosis: CHRONIC OBSTRUCTIVE PULMONARY DISEASE, UNSPECIFIED  SUBJECTIVE:    63-year-old patient who had a history of COPD mental health problem complains of cough congestion shortness of breath decreased ADL mobility arthralgia myalgia fatigue tired weakness moderate back pain knee pain advised continue gabapentin tramadol meloxicam  Sodium 141 potassium 4.6 carbon dioxide 38 BUN 25 irritation in the eye advised continue Claritin eyedrops    Arthritis neuropathy Voltaren gel B12 folic acid gabapentin hydrocodone    Behavior problem psych evaluation continue Geodon check PHQ Mini-Mental    Edema Lasix potassium magnesium check BMP    REVIEW OF SYSTEMS: Arthralgia myalgia fatigue tired anxiety insomnia  All review of systems are negative unless otherwise stated above under subjective.    LABS REVIEWED AT FACILITY: Anemia dehydration    MEDICATIONS REVIEWED AT FACILITY:   Order Order Status Revised Last Ordered    Atorvastatin Calcium Oral Tablet 40 MG (Atorvastatin Calcium) Active 2023    Cold Sore Products External Ointment (Cold Sore Products) Active 2023     Cholecalciferol Oral Tablet 50 MCG (2000 UT) (Cholecalciferol) Active 2023   This order is potentially duplicated by other orders on the chart. Click to view details.    Loratadine Oral Tablet 10 MG (Loratadine) Active 2023     Ferrous Sulfate Oral Tablet 325 (65 Fe) MG (Ferrous Sulfate) Active 2023     Omeprazole Oral Capsule Delayed Release 20 MG (Omeprazole) Active 2023  This order is potentially duplicated by other orders on the chart. Click to view details.    Saline Mist Spray  Nasal Solution 0.65 % (Saline) Active 1/13/2023     guaiFENesin Oral Liquid 100 MG/5ML (Guaifenesin) Active 1/18/2023     Rivaroxaban Oral Tablet 20 MG (Rivaroxaban) Active 7/12/2023 7/12/2023    traZODone HCl Oral Tablet 100 MG (Trazodone HCl) Active 7/12/2023 7/12/2023    Divalproex Sodium Oral Tablet Delayed Release 125 MG (Divalproex Sodium) Active 7/15/2023 7/15/2023    Artificial Tears Ophthalmic Solution 0.5-0.6 % (Polyvinyl Alcohol-Povidone (Ophth)) Active 1/16/2023     Ammonium Lactate External Lotion 12 % (Lactic Acid (Ammonium Lactate)) Active 1/20/2023     busPIRone HCl Oral Tablet 10 MG (Buspirone HCl) Active 7/12/2023 7/12/2023  This order is potentially duplicated by other orders on the chart. Click to view details.    Simethicone Oral Tablet Chewable 80 MG (Simethicone) Active 2/12/2023 2/10/2023  This order is potentially duplicated by other orders on the chart. Click to view details.    hydrOXYzine HCl Oral Tablet 25 MG (Hydroxyzine HCl) Active 7/12/2023 7/12/2023    Bisacodyl Oral Tablet Delayed Release 5 MG (Bisacodyl) Active 3/23/2023 3/23/2023    Lidocaine Pain Relief External Patch 4 % (Lidocaine) Active 6/9/2023 4/10/2023    Docusate Sodium Oral Capsule 100 MG (Docusate Sodium) Active 4/16/2023     Polyethylene Glycol 3350 Powder (Polyethylene Glycol 3350 (Bulk)) Active 4/16/2023     Lasix Oral Tablet 20 MG (Furosemide) Active 7/16/2023 7/16/2023  This order is potentially duplicated by other orders on the chart. Click to view details.    Saline Nasal Spray Solution (Saline) Active 5/5/2023 5/5/2023    Acetaminophen Oral Tablet 500 MG (Acetaminophen) Active 5/18/2023   This order is potentially duplicated by other orders on the chart. Click to view details.    Biofreeze External Gel 4 % (Menthol (Topical Analgesic)) Active 5/25/2023 5/19/2023    Fluticasone Propionate Nasal Suspension 50 MCG/ACT (Fluticasone Propionate (Nasal)) Active 5/28/2023     Albuterol Sulfate  (90 Base) MCG/ACT  Aerosol, solution Active 6/5/2023 6/1/2023    ZyPREXA Oral Tablet 10 MG (Olanzapine) Active 6/10/2023 6/10/2023  This order is potentially duplicated by other orders on the chart. Click to view details.    Nicotine Patch 24 Hour 21 MG/24HR Active 6/23/2023   This order is potentially duplicated by other orders on the chart. Click to view details.    Nicotine Transdermal Patch 24 Hour 14 MG/24HR (Nicotine) Active 6/22/2023   This order is potentially duplicated by other orders on the chart. Click to view details.    Nicotine Transdermal Patch 24 Hour 7 MG/24HR (Nicotine) Active 6/22/2023   This order is potentially duplicated by other orders on the chart. Click to view details.    Mylanta Suspension 200-200-20 MG/5ML (Alum & Mag Hydroxide-Simeth) Active 7/14/2023 7/12/2023    Gabapentin Oral Tablet 600 MG (Gabapentin) Active 7/16/2023 7/16/2023    Meloxicam Tablet 7.5 MG Active 7/16/2023 7/16/2023  This order is potentially duplicated by other orders on the chart. Click to view details.    traMADol HCl Oral Tablet 50 MG (Tramadol HCl)  Living will related issues reviewed-Code status:     OBJECTIVE: ICD-10 Short Description  1/13/2023    J44.9 CHRONIC OBSTRUCTIVE PULMONARY DISEASE, UNSPECIFIED  1/13/2023    F25.9 SCHIZOAFFECTIVE DISORDER, UNSPECIFIED  1/13/2023    F31.9 BIPOLAR DISORDER, UNSPECIFIED  1/13/2023    F41.9 ANXIETY DISORDER, UNSPECIFIED  1/13/2023    I10 ESSENTIAL (PRIMARY) HYPERTENSION  1/13/2023    K21.9 GASTRO-ESOPHAGEAL REFLUX DISEASE WITHOUT ESOPHAGITIS  1/13/2023    G47.33 OBSTRUCTIVE SLEEP APNEA (ADULT) (PEDIATRIC  1/13/2023    I51.7 CARDIOMEGALY  1/13/2023    R26.2 DIFFICULTY IN WALKING, NOT ELSEWHERE CLASSIFIED  1/13/2023    M62.81 MUSCLE WEAKNESS (GENERALIZED  1/13/2023    E66.01 MORBID (SEVERE) OBESITY DUE TO EXCESS CALORIES  1/13/2023    F11.90 OPIOID USE, UNSPECIFIED, UNCOMPLICATED  1/13/2023    F29 UNSP PSYCHOSIS NOT DUE TO A SUBSTANCE OR KNOWN PHYSIOL COND  1/13/2023    D64.9 ANEMIA,  UNSPECIFIED  1/13/2023    M47.9 SPONDYLOSIS, UNSPECIFIED  1/13/2023    E87.5 HYPERKALEMIA  1/13/2023    Z86.73 PRSNL HX OF TIA (TIA), 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  Physical Exam  Current Vitals   BP: 124/76 mmHg  11/8/2023 20:32  Temp:97.9 °F  11/8/2023 20:32  Pulse:76 bpm  11/8/2023 20:32  Weight:287.8 Lbs  11/6/2023 05:57  Resp:18 Breaths/min  11/8/2023 20:32  BS:97 mg/dL  2/3/2023 07:34  O2:95 %  11/26/2023 10:51  Pain:0  11/26/2023 10:52  Appearance: Obesity  Skin: Intact,  dry skin, no lesions, rash, petechiae or purpura.     Eyes: [place] sclera eyeglasses    ENT: Sinus congestion sore throat  Neck: JVD carotid bruit no lymphadenopathy.    Pulmonary: Decreased air entry crackles rales rhonchi systolic heart murmur. No accessory muscle use or stridor.    Cardiac: Systolic heart murmur carotids without bruits.    Abdomen: Epigastric tenderness y.  No rebound or guarding.  No CVA tenderness.    Genitourinary: Exam deferred.    Musculoskeletal: Arthritis of the spine both hip and both knee. No cyanosis, clubbing, or edema.    Neurological: Cervical lumbar radiculopathy  Psychiatric: Appropriate mood and affect.   Obesity  Assessment/Plan  Problem List Items Addressed This Visit       COPD mixed type (CMS/HCC)     Pt has moderate to severe COPD. It is progressive disease, use of MDI and proper technique discussed, rinse mouth after inhaled corticosteroids. Notify if progressive dyspnea or cough or lethargy, monitor oxygen saturation if possible with home based pulse oximetry. Avoid hospitalization and call us if any flare ups are about to happen, be sure that annual flu vaccine are uptodate and review need for pneumococcal vaccine. Light to moderate low impact exercises are very helpful and deep breathing  exercises are beneficial in chronic lung  diseases.          Schizoaffective disorder, bipolar type (CMS/[facility])     Psych evaluation         Spinal stenosis of lumbar region without neurogenic claudication     PT OT pain medication refer to Dr. Alonso         Paroxysmal atrial fibrillation (CMS/[facility])     Pt has history AF, rate &/or rhythm therapy has been considered, Anticoagulation as listed and as appropriate. Atrial fibrillation is very common as we age. Fast ventricular rate or simply rate control strategy leads to poor quality of life with chronic fatigue, dyspnea and lack of endurance. Chadsvasc score has been looked into. Cardiology if managing QT prolongation as appropriate. If palpitations or SOB happens then please notify us. QOL can be maintained as good as we can with proper strategic therapy for AF.Chances of embolism or embolic events remain high without anticoagulation therapy.          GERD without esophagitis    Hyperlipemia, mixed    Allergic conjunctivitis of both eyes - Primary       Eyedrop Claritin        This patient was seen for my regular monthly visit, nursing evaluations and nursing notes were reviewed, interim events are reviewed, interim concerns and messages were reviewed as we have communicated with nursing staff.  Any issues with the falls, skin care impairment, declining physical condition are reviewed and noted, diagnosis list were reviewed, list of medications were reviewed, living will related issues were reviewed, overall patient has been doing well, any declining in patient's condition or any change in patient's condition needs to be notified to physician promptly, discussed with nursing staff, if needed would communicate with family.  Patient stays confined here at the facility for long-term care, there are always concerns about long-term care related issues and concerns.  Nursing staff is trying their best to keep patient safe, all sort of measures has been taken to keep patient safe and comfortable.   Skin  integrity:  Nursing to monitor skin integrity as patient is at risk for pressure injuries.  Wound care per nursing    See Facility notes for measurements/assessments  Turn and reposition Q 2 hours or more  Air mattress and when up in chair cushion reducing device  Dietician to evaluate and recommend:  Nutritional supplement:  Please monitor skin integrity and other pressure areas  Referral to wound clinic if appropriate:    PLAN:  Pt has been seen for follow up visit.  The patient is here at facility for PT/OT/ST to maximize strength, function, endurance and safety.  The patient is participating in therapy. Gray Kim is making progress to the best of his/her ability.   Please see PT/OT/ST notes in the facility for detailed information regarding progression of patients progress.   Recent nursing evaluation and notes were reviewed.   Overall, patient is stable despite his/her chronic conditions.    #  Any decline or change in condition needs to be communicated with the physician or myself.    Discussion with nursing staff regarding ongoing care and management.  If needed, would communicate with family who are not present at this time.   There are no concerns at this time.  We will continue with the medications noted above.    We will continue to follow the patient here at the facility.    Discharge planning:   Discharge Home when goals are met.   Follow up with PCP in 1-2 weeks after discharge from facility.   [facility] to follow after discharge for continued PT/OT and Nursing.    *Please note that nursing facility, outside laboratory agency, and [facility] do not interface.     Completion of the note was done through Dragon voice recognition technology and may include   unintended or grammatical errors which may not have been recognized when finalizing the note.     Time:    Gil Disla MD         Electronically Signed By: Gil Disla MD   11/26/23 12:42 PM

## 2023-11-26 NOTE — PROGRESS NOTES
Name: Gray Kim  YOB: 1960    FOLLOW UP VISIT:   Allergies: Aspirin, Naproxen, Nicotine, Penicillin, Simvastatin, NSAIDs   Code Status: CPR/FULL CODE    Special Instructions:    Diet: Regular diet, Regular texture, Regular consistency   Diagnosis: CHRONIC OBSTRUCTIVE PULMONARY DISEASE, UNSPECIFIED  SUBJECTIVE:    63-year-old patient who had a history of COPD mental health problem complains of cough congestion shortness of breath decreased ADL mobility arthralgia myalgia fatigue tired weakness moderate back pain knee pain advised continue gabapentin tramadol meloxicam  Sodium 141 potassium 4.6 carbon dioxide 38 BUN 25 irritation in the eye advised continue Claritin eyedrops    Arthritis neuropathy Voltaren gel B12 folic acid gabapentin hydrocodone    Behavior problem psych evaluation continue Geodon check PHQ Mini-Mental    Edema Lasix potassium magnesium check BMP    REVIEW OF SYSTEMS: Arthralgia myalgia fatigue tired anxiety insomnia  All review of systems are negative unless otherwise stated above under subjective.    LABS REVIEWED AT FACILITY: Anemia dehydration    MEDICATIONS REVIEWED AT FACILITY:   Order Order Status Revised Last Ordered    Atorvastatin Calcium Oral Tablet 40 MG (Atorvastatin Calcium) Active 7/12/2023 7/12/2023    Cold Sore Products External Ointment (Cold Sore Products) Active 1/13/2023     Cholecalciferol Oral Tablet 50 MCG (2000 UT) (Cholecalciferol) Active 1/14/2023   This order is potentially duplicated by other orders on the chart. Click to view details.    Loratadine Oral Tablet 10 MG (Loratadine) Active 1/14/2023     Ferrous Sulfate Oral Tablet 325 (65 Fe) MG (Ferrous Sulfate) Active 1/14/2023     Omeprazole Oral Capsule Delayed Release 20 MG (Omeprazole) Active 7/11/2023 7/11/2023  This order is potentially duplicated by other orders on the chart. Click to view details.    Saline Mist Spray Nasal Solution 0.65 % (Saline) Active 1/13/2023     guaiFENesin Oral Liquid  100 MG/5ML (Guaifenesin) Active 1/18/2023     Rivaroxaban Oral Tablet 20 MG (Rivaroxaban) Active 7/12/2023 7/12/2023    traZODone HCl Oral Tablet 100 MG (Trazodone HCl) Active 7/12/2023 7/12/2023    Divalproex Sodium Oral Tablet Delayed Release 125 MG (Divalproex Sodium) Active 7/15/2023 7/15/2023    Artificial Tears Ophthalmic Solution 0.5-0.6 % (Polyvinyl Alcohol-Povidone (Ophth)) Active 1/16/2023     Ammonium Lactate External Lotion 12 % (Lactic Acid (Ammonium Lactate)) Active 1/20/2023     busPIRone HCl Oral Tablet 10 MG (Buspirone HCl) Active 7/12/2023 7/12/2023  This order is potentially duplicated by other orders on the chart. Click to view details.    Simethicone Oral Tablet Chewable 80 MG (Simethicone) Active 2/12/2023 2/10/2023  This order is potentially duplicated by other orders on the chart. Click to view details.    hydrOXYzine HCl Oral Tablet 25 MG (Hydroxyzine HCl) Active 7/12/2023 7/12/2023    Bisacodyl Oral Tablet Delayed Release 5 MG (Bisacodyl) Active 3/23/2023 3/23/2023    Lidocaine Pain Relief External Patch 4 % (Lidocaine) Active 6/9/2023 4/10/2023    Docusate Sodium Oral Capsule 100 MG (Docusate Sodium) Active 4/16/2023     Polyethylene Glycol 3350 Powder (Polyethylene Glycol 3350 (Bulk)) Active 4/16/2023     Lasix Oral Tablet 20 MG (Furosemide) Active 7/16/2023 7/16/2023  This order is potentially duplicated by other orders on the chart. Click to view details.    Saline Nasal Spray Solution (Saline) Active 5/5/2023 5/5/2023    Acetaminophen Oral Tablet 500 MG (Acetaminophen) Active 5/18/2023   This order is potentially duplicated by other orders on the chart. Click to view details.    Biofreeze External Gel 4 % (Menthol (Topical Analgesic)) Active 5/25/2023 5/19/2023    Fluticasone Propionate Nasal Suspension 50 MCG/ACT (Fluticasone Propionate (Nasal)) Active 5/28/2023     Albuterol Sulfate  (90 Base) MCG/ACT Aerosol, solution Active 6/5/2023 6/1/2023    ZyPREXA Oral Tablet 10 MG  (Olanzapine) Active 6/10/2023 6/10/2023  This order is potentially duplicated by other orders on the chart. Click to view details.    Nicotine Patch 24 Hour 21 MG/24HR Active 6/23/2023   This order is potentially duplicated by other orders on the chart. Click to view details.    Nicotine Transdermal Patch 24 Hour 14 MG/24HR (Nicotine) Active 6/22/2023   This order is potentially duplicated by other orders on the chart. Click to view details.    Nicotine Transdermal Patch 24 Hour 7 MG/24HR (Nicotine) Active 6/22/2023   This order is potentially duplicated by other orders on the chart. Click to view details.    Mylanta Suspension 200-200-20 MG/5ML (Alum & Mag Hydroxide-Simeth) Active 7/14/2023 7/12/2023    Gabapentin Oral Tablet 600 MG (Gabapentin) Active 7/16/2023 7/16/2023    Meloxicam Tablet 7.5 MG Active 7/16/2023 7/16/2023  This order is potentially duplicated by other orders on the chart. Click to view details.    traMADol HCl Oral Tablet 50 MG (Tramadol HCl)  Living will related issues reviewed-Code status:     OBJECTIVE: ICD-10 Short Description  1/13/2023    J44.9 CHRONIC OBSTRUCTIVE PULMONARY DISEASE, UNSPECIFIED  1/13/2023    F25.9 SCHIZOAFFECTIVE DISORDER, UNSPECIFIED  1/13/2023    F31.9 BIPOLAR DISORDER, UNSPECIFIED  1/13/2023    F41.9 ANXIETY DISORDER, UNSPECIFIED  1/13/2023    I10 ESSENTIAL (PRIMARY) HYPERTENSION  1/13/2023    K21.9 GASTRO-ESOPHAGEAL REFLUX DISEASE WITHOUT ESOPHAGITIS  1/13/2023    G47.33 OBSTRUCTIVE SLEEP APNEA (ADULT) (PEDIATRIC  1/13/2023    I51.7 CARDIOMEGALY  1/13/2023    R26.2 DIFFICULTY IN WALKING, NOT ELSEWHERE CLASSIFIED  1/13/2023    M62.81 MUSCLE WEAKNESS (GENERALIZED  1/13/2023    E66.01 MORBID (SEVERE) OBESITY DUE TO EXCESS CALORIES  1/13/2023    F11.90 OPIOID USE, UNSPECIFIED, UNCOMPLICATED  1/13/2023    F29 UNSP PSYCHOSIS NOT DUE TO A SUBSTANCE OR KNOWN PHYSIOL COND  1/13/2023    D64.9 ANEMIA, UNSPECIFIED  1/13/2023    M47.9 SPONDYLOSIS, UNSPECIFIED  1/13/2023     E87.5 HYPERKALEMIA  1/13/2023    Z86.73 PRSNL HX OF TIA (TIA), 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  Physical Exam  Current Vitals   BP: 124/76 mmHg  11/8/2023 20:32  Temp:97.9 °F  11/8/2023 20:32  Pulse:76 bpm  11/8/2023 20:32  Weight:287.8 Lbs  11/6/2023 05:57  Resp:18 Breaths/min  11/8/2023 20:32  BS:97 mg/dL  2/3/2023 07:34  O2:95 %  11/26/2023 10:51  Pain:0  11/26/2023 10:52  Appearance: Obesity  Skin: Intact,  dry skin, no lesions, rash, petechiae or purpura.     Eyes: [place] sclera eyeglasses    ENT: Sinus congestion sore throat  Neck: JVD carotid bruit no lymphadenopathy.    Pulmonary: Decreased air entry crackles rales rhonchi systolic heart murmur. No accessory muscle use or stridor.    Cardiac: Systolic heart murmur carotids without bruits.    Abdomen: Epigastric tenderness y.  No rebound or guarding.  No CVA tenderness.    Genitourinary: Exam deferred.    Musculoskeletal: Arthritis of the spine both hip and both knee. No cyanosis, clubbing, or edema.    Neurological: Cervical lumbar radiculopathy  Psychiatric: Appropriate mood and affect.   Obesity  Assessment/Plan   Problem List Items Addressed This Visit       COPD mixed type (CMS/HCC)     Pt has moderate to severe COPD. It is progressive disease, use of MDI and proper technique discussed, rinse mouth after inhaled corticosteroids. Notify if progressive dyspnea or cough or lethargy, monitor oxygen saturation if possible with home based pulse oximetry. Avoid hospitalization and call us if any flare ups are about to happen, be sure that annual flu vaccine are uptodate and review need for pneumococcal vaccine. Light to moderate low impact exercises are very helpful and deep breathing  exercises are beneficial in chronic lung diseases.          Schizoaffective disorder, bipolar type (CMS/HCC)      Psych evaluation         Spinal stenosis of lumbar region without neurogenic claudication     PT OT pain medication refer to Dr. Alonso         Paroxysmal atrial fibrillation (CMS/[facility])     Pt has history AF, rate &/or rhythm therapy has been considered, Anticoagulation as listed and as appropriate. Atrial fibrillation is very common as we age. Fast ventricular rate or simply rate control strategy leads to poor quality of life with chronic fatigue, dyspnea and lack of endurance. Chadsvasc score has been looked into. Cardiology if managing QT prolongation as appropriate. If palpitations or SOB happens then please notify us. QOL can be maintained as good as we can with proper strategic therapy for AF.Chances of embolism or embolic events remain high without anticoagulation therapy.          GERD without esophagitis    Hyperlipemia, mixed    Allergic conjunctivitis of both eyes - Primary       Eyedrop Claritin        This patient was seen for my regular monthly visit, nursing evaluations and nursing notes were reviewed, interim events are reviewed, interim concerns and messages were reviewed as we have communicated with nursing staff.  Any issues with the falls, skin care impairment, declining physical condition are reviewed and noted, diagnosis list were reviewed, list of medications were reviewed, living will related issues were reviewed, overall patient has been doing well, any declining in patient's condition or any change in patient's condition needs to be notified to physician promptly, discussed with nursing staff, if needed would communicate with family.  Patient stays confined here at the facility for long-term care, there are always concerns about long-term care related issues and concerns.  Nursing staff is trying their best to keep patient safe, all sort of measures has been taken to keep patient safe and comfortable.   Skin integrity:  Nursing to monitor skin integrity as patient is at risk for pressure  injuries.  Wound care per nursing    See Facility notes for measurements/assessments  Turn and reposition Q 2 hours or more  Air mattress and when up in chair cushion reducing device  Dietician to evaluate and recommend:  Nutritional supplement:  Please monitor skin integrity and other pressure areas  Referral to wound clinic if appropriate:    PLAN:  Pt has been seen for follow up visit.  The patient is here at facility for PT/OT/ST to maximize strength, function, endurance and safety.  The patient is participating in therapy. Gray Kim is making progress to the best of his/her ability.   Please see PT/OT/ST notes in the facility for detailed information regarding progression of patients progress.   Recent nursing evaluation and notes were reviewed.   Overall, patient is stable despite his/her chronic conditions.    #  Any decline or change in condition needs to be communicated with the physician or myself.    Discussion with nursing staff regarding ongoing care and management.  If needed, would communicate with family who are not present at this time.   There are no concerns at this time.  We will continue with the medications noted above.    We will continue to follow the patient here at the facility.    Discharge planning:   Discharge Home when goals are met.   Follow up with PCP in 1-2 weeks after discharge from facility.   [facility] to follow after discharge for continued PT/OT and Nursing.    *Please note that nursing facility, outside laboratory agency, and  [facility] do not interface.     Completion of the note was done through Dragon voice recognition technology and may include   unintended or grammatical errors which may not have been recognized when finalizing the note.     Time:    Gil Disla MD

## 2024-01-13 ENCOUNTER — NURSING HOME VISIT (OUTPATIENT)
Dept: POST ACUTE CARE | Facility: EXTERNAL LOCATION | Age: 64
End: 2024-01-13
Payer: COMMERCIAL

## 2024-01-13 DIAGNOSIS — Z86.711 HISTORY OF PULMONARY EMBOLISM: ICD-10-CM

## 2024-01-13 DIAGNOSIS — Z78.0 ENCOUNTER FOR OSTEOPOROSIS SCREENING IN ASYMPTOMATIC POSTMENOPAUSAL PATIENT: ICD-10-CM

## 2024-01-13 DIAGNOSIS — J44.9 COPD MIXED TYPE (MULTI): ICD-10-CM

## 2024-01-13 DIAGNOSIS — M48.061 SPINAL STENOSIS OF LUMBAR REGION WITHOUT NEUROGENIC CLAUDICATION: ICD-10-CM

## 2024-01-13 DIAGNOSIS — Z12.31 ENCOUNTER FOR SCREENING MAMMOGRAM FOR MALIGNANT NEOPLASM OF BREAST: ICD-10-CM

## 2024-01-13 DIAGNOSIS — K21.9 GERD WITHOUT ESOPHAGITIS: ICD-10-CM

## 2024-01-13 DIAGNOSIS — Z13.820 ENCOUNTER FOR OSTEOPOROSIS SCREENING IN ASYMPTOMATIC POSTMENOPAUSAL PATIENT: ICD-10-CM

## 2024-01-13 DIAGNOSIS — E78.2 HYPERLIPEMIA, MIXED: ICD-10-CM

## 2024-01-13 DIAGNOSIS — F25.0 SCHIZOAFFECTIVE DISORDER, BIPOLAR TYPE (MULTI): ICD-10-CM

## 2024-01-13 DIAGNOSIS — I48.0 PAROXYSMAL ATRIAL FIBRILLATION (MULTI): ICD-10-CM

## 2024-01-13 DIAGNOSIS — N39.0 UTI (URINARY TRACT INFECTION), UNCOMPLICATED: Primary | ICD-10-CM

## 2024-01-13 DIAGNOSIS — Z11.59 NEED FOR HEPATITIS C SCREENING TEST: ICD-10-CM

## 2024-01-13 PROBLEM — G45.9 TIA (TRANSIENT ISCHEMIC ATTACK): Status: RESOLVED | Noted: 2023-04-05 | Resolved: 2024-01-13

## 2024-01-13 PROBLEM — H10.13 ALLERGIC CONJUNCTIVITIS OF BOTH EYES: Status: RESOLVED | Noted: 2023-11-26 | Resolved: 2024-01-13

## 2024-01-13 PROBLEM — F33.41 RECURRENT MAJOR DEPRESSIVE DISORDER, IN PARTIAL REMISSION (CMS-HCC): Status: RESOLVED | Noted: 2023-04-05 | Resolved: 2024-01-13

## 2024-01-13 PROCEDURE — 99497 ADVNCD CARE PLAN 30 MIN: CPT | Performed by: INTERNAL MEDICINE

## 2024-01-13 PROCEDURE — 99308 SBSQ NF CARE LOW MDM 20: CPT | Performed by: INTERNAL MEDICINE

## 2024-01-13 NOTE — LETTER
Patient: Gray Kim  : 1960    Encounter Date: 2024    Name: Gray Kim  YOB: 1960    FOLLOW UP VISIT:   Allergies: Aspirin, Naproxen, Nicotine, Penicillin, Simvastatin, NSAIDs   Code Status: CPR/FULL CODE    Special Instructions:    Diet: Regular diet, Regular texture, Regular consistency   Diagnosis: CHRONIC OBSTRUCTIVE PULMONARY DISEASE, UNSPECIFIED  SUBJECTIVE:  Evaluated for UTI constipation anxiety loratadine balance advised continue Colace MiraLAX probiotics nitrofurantoin and BuSpar repeat CBC BMP UA CNS and follow-up    Chloride 94 BUN 32 calcium 11.3 leukopenia hemoglobin A1c 5.4 TSH normal    Arthritis neuropathy Voltaren gel B12 folic acid gabapentin hydrocodone    Behavior problem psych evaluation continue Geodon check PHQ Mini-Mental    Edema Lasix potassium magnesium check BMP    REVIEW OF SYSTEMS: Arthralgia myalgia fatigue tired anxiety insomnia  All review of systems are negative unless otherwise stated above under subjective.    LABS REVIEWED AT FACILITY: Anemia dehydration    MEDICATIONS REVIEWED AT FACILITY:   Order Order Status Revised Last Ordered    Atorvastatin Calcium Oral Tablet 40 MG (Atorvastatin Calcium) Active 2023    Cold Sore Products External Ointment (Cold Sore Products) Active 2023     Cholecalciferol Oral Tablet 50 MCG (2000 UT) (Cholecalciferol) Active 2023   This order is potentially duplicated by other orders on the chart. Click to view details.    Loratadine Oral Tablet 10 MG (Loratadine) Active 2023     Ferrous Sulfate Oral Tablet 325 (65 Fe) MG (Ferrous Sulfate) Active 2023     Omeprazole Oral Capsule Delayed Release 20 MG (Omeprazole) Active 2023  This order is potentially duplicated by other orders on the chart. Click to view details.    Saline Mist Spray Nasal Solution 0.65 % (Saline) Active 2023     guaiFENesin Oral Liquid 100 MG/5ML (Guaifenesin) Active 2023     Rivaroxaban  Oral Tablet 20 MG (Rivaroxaban) Active 7/12/2023 7/12/2023    traZODone HCl Oral Tablet 100 MG (Trazodone HCl) Active 7/12/2023 7/12/2023    Divalproex Sodium Oral Tablet Delayed Release 125 MG (Divalproex Sodium) Active 7/15/2023 7/15/2023    Artificial Tears Ophthalmic Solution 0.5-0.6 % (Polyvinyl Alcohol-Povidone (Ophth)) Active 1/16/2023     Ammonium Lactate External Lotion 12 % (Lactic Acid (Ammonium Lactate)) Active 1/20/2023     busPIRone HCl Oral Tablet 10 MG (Buspirone HCl) Active 7/12/2023 7/12/2023  This order is potentially duplicated by other orders on the chart. Click to view details.    Simethicone Oral Tablet Chewable 80 MG (Simethicone) Active 2/12/2023 2/10/2023  This order is potentially duplicated by other orders on the chart. Click to view details.    hydrOXYzine HCl Oral Tablet 25 MG (Hydroxyzine HCl) Active 7/12/2023 7/12/2023    Bisacodyl Oral Tablet Delayed Release 5 MG (Bisacodyl) Active 3/23/2023 3/23/2023    Lidocaine Pain Relief External Patch 4 % (Lidocaine) Active 6/9/2023 4/10/2023    Docusate Sodium Oral Capsule 100 MG (Docusate Sodium) Active 4/16/2023     Polyethylene Glycol 3350 Powder (Polyethylene Glycol 3350 (Bulk)) Active 4/16/2023     Lasix Oral Tablet 20 MG (Furosemide) Active 7/16/2023 7/16/2023  This order is potentially duplicated by other orders on the chart. Click to view details.    Saline Nasal Spray Solution (Saline) Active 5/5/2023 5/5/2023    Acetaminophen Oral Tablet 500 MG (Acetaminophen) Active 5/18/2023   This order is potentially duplicated by other orders on the chart. Click to view details.    Biofreeze External Gel 4 % (Menthol (Topical Analgesic)) Active 5/25/2023 5/19/2023    Fluticasone Propionate Nasal Suspension 50 MCG/ACT (Fluticasone Propionate (Nasal)) Active 5/28/2023     Albuterol Sulfate  (90 Base) MCG/ACT Aerosol, solution Active 6/5/2023 6/1/2023    ZyPREXA Oral Tablet 10 MG (Olanzapine) Active 6/10/2023 6/10/2023  This order is  potentially duplicated by other orders on the chart. Click to view details.    Nicotine Patch 24 Hour 21 MG/24HR Active 6/23/2023   This order is potentially duplicated by other orders on the chart. Click to view details.    Nicotine Transdermal Patch 24 Hour 14 MG/24HR (Nicotine) Active 6/22/2023   This order is potentially duplicated by other orders on the chart. Click to view details.    Nicotine Transdermal Patch 24 Hour 7 MG/24HR (Nicotine) Active 6/22/2023   This order is potentially duplicated by other orders on the chart. Click to view details.    Mylanta Suspension 200-200-20 MG/5ML (Alum & Mag Hydroxide-Simeth) Active 7/14/2023 7/12/2023    Gabapentin Oral Tablet 600 MG (Gabapentin) Active 7/16/2023 7/16/2023    Meloxicam Tablet 7.5 MG Active 7/16/2023 7/16/2023  This order is potentially duplicated by other orders on the chart. Click to view details.    traMADol HCl Oral Tablet 50 MG (Tramadol HCl)  Living will related issues reviewed-Code status:     OBJECTIVE: ICD-10 Short Description  1/13/2023    J44.9 CHRONIC OBSTRUCTIVE PULMONARY DISEASE, UNSPECIFIED  1/13/2023    F25.9 SCHIZOAFFECTIVE DISORDER, UNSPECIFIED  1/13/2023    F31.9 BIPOLAR DISORDER, UNSPECIFIED  1/13/2023    F41.9 ANXIETY DISORDER, UNSPECIFIED  1/13/2023    I10 ESSENTIAL (PRIMARY) HYPERTENSION  1/13/2023    K21.9 GASTRO-ESOPHAGEAL REFLUX DISEASE WITHOUT ESOPHAGITIS  1/13/2023    G47.33 OBSTRUCTIVE SLEEP APNEA (ADULT) (PEDIATRIC  1/13/2023    I51.7 CARDIOMEGALY  1/13/2023    R26.2 DIFFICULTY IN WALKING, NOT ELSEWHERE CLASSIFIED  1/13/2023    M62.81 MUSCLE WEAKNESS (GENERALIZED  1/13/2023    E66.01 MORBID (SEVERE) OBESITY DUE TO EXCESS CALORIES  1/13/2023    F11.90 OPIOID USE, UNSPECIFIED, UNCOMPLICATED  1/13/2023    F29 UNSP PSYCHOSIS NOT DUE TO A SUBSTANCE OR KNOWN PHYSIOL COND  1/13/2023    D64.9 ANEMIA, UNSPECIFIED  1/13/2023    M47.9 SPONDYLOSIS, UNSPECIFIED  1/13/2023    E87.5 HYPERKALEMIA  1/13/2023    Z86.73 PRSNL HX OF TIA (TIA),  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  Physical Exam  Current Vitals     BP: 148/56 mmHg  1/7/2024 22:20    Temp:97 °F  1/7/2024 22:20  Pulse:98 bpm  1/7/2024 22:20  Weight:290.4 Lbs  1/4/2024 13:34  Resp:18 Breaths/min  1/7/2024 22:20  BS:97 mg/dL  2/3/2023 07:34  O2:96 %  1/13/2024 07:28  Pain:0  1/13/2024 07:28  Appearance: Obesity  Skin: Intact,  dry skin, no lesions, rash, petechiae or purpura.     Eyes: [place] sclera eyeglasses    ENT: Sinus congestion sore throat  Neck: JVD carotid bruit no lymphadenopathy.    Pulmonary: Decreased air entry crackles rales rhonchi systolic heart murmur. No accessory muscle use or stridor.    Cardiac: Systolic heart murmur carotids without bruits.    Abdomen: Epigastric tenderness y.  No rebound or guarding.  No CVA tenderness.    Genitourinary: Exam deferred.    Musculoskeletal: Arthritis of the spine both hip and both knee. No cyanosis, clubbing, or edema.    Neurological: Cervical lumbar radiculopathy  Psychiatric: Appropriate mood and affect.   Obesity  Assessment/Plan  COPD exacerbation    UTI    Schizoaffective disorder anxiety depression    Hypertension hyperlipidemia    Cardiomegaly    Hyperglycemia    Anemia    Osteopenia osteoarthritis    Spinal stenosis with chronic back pain    Constipation    Hypercalcemia    Recommendation and plan as follow  Skin care bladder care bowel GERD prophylaxis  UTI nitrofurantoin check repeat UA CNS probiotics  Constipation MiraLAX Colace GI evaluation  Anxiety depression BuSpar and Lexapro  Mental health problem psych evaluation continue Geodon Depakote  Chronic pain pain medicine management physical therapy Occupational Therapy narcotics plus lidocaine and gabapentin and ibuprofen  Insomnia trazodone melatonin  COPD Trelegy inhaler therapy  History of pulmonary emboli  continue Xarelto monitor oxygen a CBC with differential  Voluntary discuss with patient about Advance care planning DO NOT RESUSCITATE I  spent more than 18 minutes discussing advanced Planning including an explanation and discussion of advanced directives discussed about a living will and power of  patient was encouraged to work on completing this documentation options are1= DO NOT RESUSCITATE CC2=, DO NOT RESUSCITATE  at arrest,3= full code documented in the chart.        Problem List Items Addressed This Visit       COPD mixed type (CMS/HCC)    Relevant Orders    Lipid panel    HIV 1/2 Antigen/Antibody Screen with Reflex to Confirmation    Albumin , Urine Random    CBC and Auto Differential    Comprehensive Metabolic Panel    Hemoglobin A1C    Lipid Panel    Microscopic Only, Urine    Uric Acid    Parathyroid Hormone, Intact    Schizoaffective disorder, bipolar type (CMS/HCC)    Relevant Orders    Lipid panel    HIV 1/2 Antigen/Antibody Screen with Reflex to Confirmation    Albumin , Urine Random    CBC and Auto Differential    Comprehensive Metabolic Panel    Hemoglobin A1C    Lipid Panel    Microscopic Only, Urine    Uric Acid    Parathyroid Hormone, Intact    Spinal stenosis of lumbar region without neurogenic claudication    Relevant Orders    Lipid panel    HIV 1/2 Antigen/Antibody Screen with Reflex to Confirmation    Albumin , Urine Random    CBC and Auto Differential    Comprehensive Metabolic Panel    Hemoglobin A1C    Lipid Panel    Microscopic Only, Urine    Uric Acid    Parathyroid Hormone, Intact    Paroxysmal atrial fibrillation (CMS/HCC)    Relevant Orders    Lipid panel    HIV 1/2 Antigen/Antibody Screen with Reflex to Confirmation    Albumin , Urine Random    CBC and Auto Differential    Comprehensive Metabolic Panel    Hemoglobin A1C    Lipid Panel    Microscopic Only, Urine    Uric Acid    Parathyroid Hormone, Intact    GERD without esophagitis    Relevant Orders    Lipid panel    HIV  1/2 Antigen/Antibody Screen with Reflex to Confirmation    Albumin , Urine Random    CBC and Auto Differential    Comprehensive Metabolic Panel    Hemoglobin A1C    Lipid Panel    Microscopic Only, Urine    Uric Acid    Parathyroid Hormone, Intact    Hyperlipemia, mixed    Relevant Orders    Lipid panel    HIV 1/2 Antigen/Antibody Screen with Reflex to Confirmation    Albumin , Urine Random    CBC and Auto Differential    Comprehensive Metabolic Panel    Hemoglobin A1C    Lipid Panel    Microscopic Only, Urine    Uric Acid    Parathyroid Hormone, Intact    UTI (urinary tract infection), uncomplicated - Primary    Relevant Orders    Lipid panel    HIV 1/2 Antigen/Antibody Screen with Reflex to Confirmation    Albumin , Urine Random    CBC and Auto Differential    Comprehensive Metabolic Panel    Hemoglobin A1C    Lipid Panel    Microscopic Only, Urine    Uric Acid    Parathyroid Hormone, Intact     Other Visit Diagnoses       Screening for osteoporosis        Relevant Orders    Lipid panel    HIV 1/2 Antigen/Antibody Screen with Reflex to Confirmation    Albumin , Urine Random    CBC and Auto Differential    Comprehensive Metabolic Panel    Hemoglobin A1C    Lipid Panel    Microscopic Only, Urine    Uric Acid    XR DEXA bone density    Parathyroid Hormone, Intact    Encounter for screening mammogram for malignant neoplasm of breast        Relevant Orders    Lipid panel    HIV 1/2 Antigen/Antibody Screen with Reflex to Confirmation    Albumin , Urine Random    CBC and Auto Differential    Comprehensive Metabolic Panel    Hemoglobin A1C    Lipid Panel    Microscopic Only, Urine    Uric Acid    BI mammo bilateral screening tomosynthesis    Parathyroid Hormone, Intact    Encounter for osteoporosis screening in asymptomatic postmenopausal patient        Relevant Orders    XR DEXA bone density    Diabetes mellitus due to underlying condition with hyperosmolarity without coma, without long-term current use of insulin  (CMS/[facility])        Relevant Orders    Hemoglobin A1C        This patient was seen for my regular monthly visit, nursing evaluations and nursing notes were reviewed, interim events are reviewed, interim concerns and messages were reviewed as we have communicated with nursing staff.  Any issues with the falls, skin care impairment, declining physical condition are reviewed and noted, diagnosis list were reviewed, list of medications were reviewed, living will related issues were reviewed, overall patient has been doing well, any declining in patient's condition or any change in patient's condition needs to be notified to physician promptly, discussed with nursing staff, if needed would communicate with family.  Patient stays confined here at the facility for long-term care, there are always concerns about long-term care related issues and concerns.  Nursing staff is trying their best to keep patient safe, all sort of measures has been taken to keep patient safe and comfortable.   Skin integrity:  Nursing to monitor skin integrity as patient is at risk for pressure injuries.  Wound care per nursing    See Facility notes for measurements/assessments  Turn and reposition Q 2 hours or more  Air mattress and when up in chair cushion reducing device  Dietician to evaluate and recommend:  Nutritional supplement:  Please monitor skin integrity and other pressure areas  Referral to wound clinic if appropriate:    PLAN:  Pt has been seen for follow up visit.  The patient is here at facility for PT/OT/ST to maximize strength, function, endurance and safety.  The patient is participating in therapy. Gray Kim is making progress to the best of his/her ability.   Please see PT/OT/ST notes in the facility for detailed information regarding progression of patients progress.   Recent nursing evaluation and notes were reviewed.   Overall, patient is stable despite his/her chronic conditions.    #  Any decline or change in condition  needs to be communicated with the physician or myself.    Discussion with nursing staff regarding ongoing care and management.  If needed, would communicate with family who are not present at this time.   There are no concerns at this time.  We will continue with the medications noted above.    We will continue to follow the patient here at the facility.    Discharge planning:   Discharge Home when goals are met.   Follow up with PCP in 1-2 weeks after discharge from facility.   C to follow after discharge for continued PT/OT and Nursing.    *Please note that nursing facility, outside laboratory agency, and [facility] do not interface.     Completion of the note was done through Dragon voice recognition technology and may include   unintended or grammatical errors which may not have been recognized when finalizing the note.     Time:    Gil Disla MD       Electronically Signed By: Gil Disla MD   1/13/24 12:00 PM

## 2024-01-13 NOTE — PROGRESS NOTES
Name: Gray iKm  YOB: 1960    FOLLOW UP VISIT:   Allergies: Aspirin, Naproxen, Nicotine, Penicillin, Simvastatin, NSAIDs   Code Status: CPR/FULL CODE    Special Instructions:    Diet: Regular diet, Regular texture, Regular consistency   Diagnosis: CHRONIC OBSTRUCTIVE PULMONARY DISEASE, UNSPECIFIED  SUBJECTIVE:  Evaluated for UTI constipation anxiety loratadine balance advised continue Colace MiraLAX probiotics nitrofurantoin and BuSpar repeat CBC BMP UA CNS and follow-up    Chloride 94 BUN 32 calcium 11.3 leukopenia hemoglobin A1c 5.4 TSH normal    Arthritis neuropathy Voltaren gel B12 folic acid gabapentin hydrocodone    Behavior problem psych evaluation continue Geodon check PHQ Mini-Mental    Edema Lasix potassium magnesium check BMP    REVIEW OF SYSTEMS: Arthralgia myalgia fatigue tired anxiety insomnia  All review of systems are negative unless otherwise stated above under subjective.    LABS REVIEWED AT FACILITY: Anemia dehydration    MEDICATIONS REVIEWED AT FACILITY:   Order Order Status Revised Last Ordered    Atorvastatin Calcium Oral Tablet 40 MG (Atorvastatin Calcium) Active 7/12/2023 7/12/2023    Cold Sore Products External Ointment (Cold Sore Products) Active 1/13/2023     Cholecalciferol Oral Tablet 50 MCG (2000 UT) (Cholecalciferol) Active 1/14/2023   This order is potentially duplicated by other orders on the chart. Click to view details.    Loratadine Oral Tablet 10 MG (Loratadine) Active 1/14/2023     Ferrous Sulfate Oral Tablet 325 (65 Fe) MG (Ferrous Sulfate) Active 1/14/2023     Omeprazole Oral Capsule Delayed Release 20 MG (Omeprazole) Active 7/11/2023 7/11/2023  This order is potentially duplicated by other orders on the chart. Click to view details.    Saline Mist Spray Nasal Solution 0.65 % (Saline) Active 1/13/2023     guaiFENesin Oral Liquid 100 MG/5ML (Guaifenesin) Active 1/18/2023     Rivaroxaban Oral Tablet 20 MG (Rivaroxaban) Active 7/12/2023 7/12/2023    traZODone  HCl Oral Tablet 100 MG (Trazodone HCl) Active 7/12/2023 7/12/2023    Divalproex Sodium Oral Tablet Delayed Release 125 MG (Divalproex Sodium) Active 7/15/2023 7/15/2023    Artificial Tears Ophthalmic Solution 0.5-0.6 % (Polyvinyl Alcohol-Povidone (Ophth)) Active 1/16/2023     Ammonium Lactate External Lotion 12 % (Lactic Acid (Ammonium Lactate)) Active 1/20/2023     busPIRone HCl Oral Tablet 10 MG (Buspirone HCl) Active 7/12/2023 7/12/2023  This order is potentially duplicated by other orders on the chart. Click to view details.    Simethicone Oral Tablet Chewable 80 MG (Simethicone) Active 2/12/2023 2/10/2023  This order is potentially duplicated by other orders on the chart. Click to view details.    hydrOXYzine HCl Oral Tablet 25 MG (Hydroxyzine HCl) Active 7/12/2023 7/12/2023    Bisacodyl Oral Tablet Delayed Release 5 MG (Bisacodyl) Active 3/23/2023 3/23/2023    Lidocaine Pain Relief External Patch 4 % (Lidocaine) Active 6/9/2023 4/10/2023    Docusate Sodium Oral Capsule 100 MG (Docusate Sodium) Active 4/16/2023     Polyethylene Glycol 3350 Powder (Polyethylene Glycol 3350 (Bulk)) Active 4/16/2023     Lasix Oral Tablet 20 MG (Furosemide) Active 7/16/2023 7/16/2023  This order is potentially duplicated by other orders on the chart. Click to view details.    Saline Nasal Spray Solution (Saline) Active 5/5/2023 5/5/2023    Acetaminophen Oral Tablet 500 MG (Acetaminophen) Active 5/18/2023   This order is potentially duplicated by other orders on the chart. Click to view details.    Biofreeze External Gel 4 % (Menthol (Topical Analgesic)) Active 5/25/2023 5/19/2023    Fluticasone Propionate Nasal Suspension 50 MCG/ACT (Fluticasone Propionate (Nasal)) Active 5/28/2023     Albuterol Sulfate  (90 Base) MCG/ACT Aerosol, solution Active 6/5/2023 6/1/2023    ZyPREXA Oral Tablet 10 MG (Olanzapine) Active 6/10/2023 6/10/2023  This order is potentially duplicated by other orders on the chart. Click to view details.     Nicotine Patch 24 Hour 21 MG/24HR Active 6/23/2023   This order is potentially duplicated by other orders on the chart. Click to view details.    Nicotine Transdermal Patch 24 Hour 14 MG/24HR (Nicotine) Active 6/22/2023   This order is potentially duplicated by other orders on the chart. Click to view details.    Nicotine Transdermal Patch 24 Hour 7 MG/24HR (Nicotine) Active 6/22/2023   This order is potentially duplicated by other orders on the chart. Click to view details.    Mylanta Suspension 200-200-20 MG/5ML (Alum & Mag Hydroxide-Simeth) Active 7/14/2023 7/12/2023    Gabapentin Oral Tablet 600 MG (Gabapentin) Active 7/16/2023 7/16/2023    Meloxicam Tablet 7.5 MG Active 7/16/2023 7/16/2023  This order is potentially duplicated by other orders on the chart. Click to view details.    traMADol HCl Oral Tablet 50 MG (Tramadol HCl)  Living will related issues reviewed-Code status:     OBJECTIVE: ICD-10 Short Description  1/13/2023    J44.9 CHRONIC OBSTRUCTIVE PULMONARY DISEASE, UNSPECIFIED  1/13/2023    F25.9 SCHIZOAFFECTIVE DISORDER, UNSPECIFIED  1/13/2023    F31.9 BIPOLAR DISORDER, UNSPECIFIED  1/13/2023    F41.9 ANXIETY DISORDER, UNSPECIFIED  1/13/2023    I10 ESSENTIAL (PRIMARY) HYPERTENSION  1/13/2023    K21.9 GASTRO-ESOPHAGEAL REFLUX DISEASE WITHOUT ESOPHAGITIS  1/13/2023    G47.33 OBSTRUCTIVE SLEEP APNEA (ADULT) (PEDIATRIC  1/13/2023    I51.7 CARDIOMEGALY  1/13/2023    R26.2 DIFFICULTY IN WALKING, NOT ELSEWHERE CLASSIFIED  1/13/2023    M62.81 MUSCLE WEAKNESS (GENERALIZED  1/13/2023    E66.01 MORBID (SEVERE) OBESITY DUE TO EXCESS CALORIES  1/13/2023    F11.90 OPIOID USE, UNSPECIFIED, UNCOMPLICATED  1/13/2023    F29 UNSP PSYCHOSIS NOT DUE TO A SUBSTANCE OR KNOWN PHYSIOL COND  1/13/2023    D64.9 ANEMIA, UNSPECIFIED  1/13/2023    M47.9 SPONDYLOSIS, UNSPECIFIED  1/13/2023    E87.5 HYPERKALEMIA  1/13/2023    Z86.73 PRSNL HX OF TIA (TIA), AND CEREB INFRC W/O RESID DEFICITS  5/10/2023    R13.12 DYSPHAGIA,  OROPHARYNGEAL PHASE  1/13/2023    J96.21 ACUTE AND CHRONIC RESPIRATORY FAILURE WITH HYPOXIA  There were no vitals taken for this visit.  Physical Exam  Current Vitals     BP: 148/56 mmHg  1/7/2024 22:20    Temp:97 °F  1/7/2024 22:20  Pulse:98 bpm  1/7/2024 22:20  Weight:290.4 Lbs  1/4/2024 13:34  Resp:18 Breaths/min  1/7/2024 22:20  BS:97 mg/dL  2/3/2023 07:34  O2:96 %  1/13/2024 07:28  Pain:0  1/13/2024 07:28  Appearance: Obesity  Skin: Intact,  dry skin, no lesions, rash, petechiae or purpura.     Eyes: [place] sclera eyeglasses    ENT: Sinus congestion sore throat  Neck: JVD carotid bruit no lymphadenopathy.    Pulmonary: Decreased air entry crackles rales rhonchi systolic heart murmur. No accessory muscle use or stridor.    Cardiac: Systolic heart murmur carotids without bruits.    Abdomen: Epigastric tenderness y.  No rebound or guarding.  No CVA tenderness.    Genitourinary: Exam deferred.    Musculoskeletal: Arthritis of the spine both hip and both knee. No cyanosis, clubbing, or edema.    Neurological: Cervical lumbar radiculopathy  Psychiatric: Appropriate mood and affect.   Obesity  Assessment/Plan   COPD exacerbation    UTI    Schizoaffective disorder anxiety depression    Hypertension hyperlipidemia    Cardiomegaly    Hyperglycemia    Anemia    Osteopenia osteoarthritis    Spinal stenosis with chronic back pain    Constipation    Hypercalcemia    Recommendation and plan as follow  Skin care bladder care bowel GERD prophylaxis  UTI nitrofurantoin check repeat UA CNS probiotics  Constipation MiraLAX Colace GI evaluation  Anxiety depression BuSpar and Lexapro  Mental health problem psych evaluation continue Geodon Depakote  Chronic pain pain medicine management physical therapy Occupational Therapy narcotics plus lidocaine and gabapentin and ibuprofen  Insomnia trazodone melatonin  COPD Trelegy inhaler therapy  History of pulmonary emboli continue Xarelto monitor oxygen a CBC with differential  Voluntary  discuss with patient about Advance care planning DO NOT RESUSCITATE I  spent more than 18 minutes discussing advanced Planning including an explanation and discussion of advanced directives discussed about a living will and power of  patient was encouraged to work on completing this documentation options are1= DO NOT RESUSCITATE CC2=, DO NOT RESUSCITATE  at arrest,3= full code documented in the chart.        Problem List Items Addressed This Visit       COPD mixed type (CMS/[facility])    Relevant Orders    Lipid panel    HIV 1/2 Antigen/Antibody Screen with Reflex to Confirmation    Albumin , Urine Random    CBC and Auto Differential    Comprehensive Metabolic Panel    Hemoglobin A1C    Lipid Panel    Microscopic Only, Urine    Uric Acid    Parathyroid Hormone, Intact    Schizoaffective disorder, bipolar type (CMS/[facility])    Relevant Orders    Lipid panel    HIV 1/2 Antigen/Antibody Screen with Reflex to Confirmation    Albumin , Urine Random    CBC and Auto Differential    Comprehensive Metabolic Panel    Hemoglobin A1C    Lipid Panel    Microscopic Only, Urine    Uric Acid    Parathyroid Hormone, Intact    Spinal stenosis of lumbar region without neurogenic claudication    Relevant Orders    Lipid panel    HIV 1/2 Antigen/Antibody Screen with Reflex to Confirmation    Albumin , Urine Random    CBC and Auto Differential    Comprehensive Metabolic Panel    Hemoglobin A1C    Lipid Panel    Microscopic Only, Urine    Uric Acid    Parathyroid Hormone, Intact    Paroxysmal atrial fibrillation (CMS/HCC)    Relevant Orders    Lipid panel    HIV 1/2 Antigen/Antibody Screen with Reflex to Confirmation    Albumin , Urine Random    CBC and Auto Differential    Comprehensive Metabolic Panel    Hemoglobin A1C    Lipid Panel    Microscopic Only, Urine    Uric Acid    Parathyroid Hormone, Intact    GERD without esophagitis    Relevant Orders    Lipid panel    HIV 1/2 Antigen/Antibody Screen with Reflex to Confirmation    Albumin ,  Urine Random    CBC and Auto Differential    Comprehensive Metabolic Panel    Hemoglobin A1C    Lipid Panel    Microscopic Only, Urine    Uric Acid    Parathyroid Hormone, Intact    Hyperlipemia, mixed    Relevant Orders    Lipid panel    HIV 1/2 Antigen/Antibody Screen with Reflex to Confirmation    Albumin , Urine Random    CBC and Auto Differential    Comprehensive Metabolic Panel    Hemoglobin A1C    Lipid Panel    Microscopic Only, Urine    Uric Acid    Parathyroid Hormone, Intact    UTI (urinary tract infection), uncomplicated - Primary    Relevant Orders    Lipid panel    HIV 1/2 Antigen/Antibody Screen with Reflex to Confirmation    Albumin , Urine Random    CBC and Auto Differential    Comprehensive Metabolic Panel    Hemoglobin A1C    Lipid Panel    Microscopic Only, Urine    Uric Acid    Parathyroid Hormone, Intact     Other Visit Diagnoses       Screening for osteoporosis        Relevant Orders    Lipid panel    HIV 1/2 Antigen/Antibody Screen with Reflex to Confirmation    Albumin , Urine Random    CBC and Auto Differential    Comprehensive Metabolic Panel    Hemoglobin A1C    Lipid Panel    Microscopic Only, Urine    Uric Acid    XR DEXA bone density    Parathyroid Hormone, Intact    Encounter for screening mammogram for malignant neoplasm of breast        Relevant Orders    Lipid panel    HIV 1/2 Antigen/Antibody Screen with Reflex to Confirmation    Albumin , Urine Random    CBC and Auto Differential    Comprehensive Metabolic Panel    Hemoglobin A1C    Lipid Panel    Microscopic Only, Urine    Uric Acid    BI mammo bilateral screening tomosynthesis    Parathyroid Hormone, Intact    Encounter for osteoporosis screening in asymptomatic postmenopausal patient        Relevant Orders    XR DEXA bone density    Diabetes mellitus due to underlying condition with hyperosmolarity without coma, without long-term current use of insulin (CMS/[facility])        Relevant Orders    Hemoglobin A1C        This patient was  seen for my regular monthly visit, nursing evaluations and nursing notes were reviewed, interim events are reviewed, interim concerns and messages were reviewed as we have communicated with nursing staff.  Any issues with the falls, skin care impairment, declining physical condition are reviewed and noted, diagnosis list were reviewed, list of medications were reviewed, living will related issues were reviewed, overall patient has been doing well, any declining in patient's condition or any change in patient's condition needs to be notified to physician promptly, discussed with nursing staff, if needed would communicate with family.  Patient stays confined here at the facility for long-term care, there are always concerns about long-term care related issues and concerns.  Nursing staff is trying their best to keep patient safe, all sort of measures has been taken to keep patient safe and comfortable.   Skin integrity:  Nursing to monitor skin integrity as patient is at risk for pressure injuries.  Wound care per nursing    See Facility notes for measurements/assessments  Turn and reposition Q 2 hours or more  Air mattress and when up in chair cushion reducing device  Dietician to evaluate and recommend:  Nutritional supplement:  Please monitor skin integrity and other pressure areas  Referral to wound clinic if appropriate:    PLAN:  Pt has been seen for follow up visit.  The patient is here at facility for PT/OT/ST to maximize strength, function, endurance and safety.  The patient is participating in therapy. Gray Kim is making progress to the best of his/her ability.   Please see PT/OT/ST notes in the facility for detailed information regarding progression of patients progress.   Recent nursing evaluation and notes were reviewed.   Overall, patient is stable despite his/her chronic conditions.    #  Any decline or change in condition needs to be communicated with the physician or myself.    Discussion with  nursing staff regarding ongoing care and management.  If needed, would communicate with family who are not present at this time.   There are no concerns at this time.  We will continue with the medications noted above.    We will continue to follow the patient here at the facility.    Discharge planning:   Discharge Home when goals are met.   Follow up with PCP in 1-2 weeks after discharge from facility.   C to follow after discharge for continued PT/OT and Nursing.    *Please note that nursing facility, outside laboratory agency, and [facility] do not interface.     Completion of the note was done through Dragon voice recognition technology and may include   unintended or grammatical errors which may not have been recognized when finalizing the note.     Time:    Gil Disla MD

## 2024-01-20 PROCEDURE — 99233 SBSQ HOSP IP/OBS HIGH 50: CPT | Performed by: INTERNAL MEDICINE

## 2024-02-15 ENCOUNTER — NURSING HOME VISIT (OUTPATIENT)
Dept: POST ACUTE CARE | Facility: EXTERNAL LOCATION | Age: 64
End: 2024-02-15
Payer: COMMERCIAL

## 2024-02-15 DIAGNOSIS — A04.72 C. DIFFICILE COLITIS: Primary | ICD-10-CM

## 2024-02-15 DIAGNOSIS — K21.9 GERD WITHOUT ESOPHAGITIS: ICD-10-CM

## 2024-02-15 DIAGNOSIS — J44.9 COPD MIXED TYPE (MULTI): ICD-10-CM

## 2024-02-15 DIAGNOSIS — M48.061 SPINAL STENOSIS OF LUMBAR REGION WITHOUT NEUROGENIC CLAUDICATION: ICD-10-CM

## 2024-02-15 DIAGNOSIS — E78.2 HYPERLIPEMIA, MIXED: ICD-10-CM

## 2024-02-15 DIAGNOSIS — I48.0 PAROXYSMAL ATRIAL FIBRILLATION (MULTI): ICD-10-CM

## 2024-02-15 DIAGNOSIS — S62.637A DISPLACED FRACTURE OF DISTAL PHALANX OF LEFT LITTLE FINGER, INITIAL ENCOUNTER FOR CLOSED FRACTURE: ICD-10-CM

## 2024-02-15 DIAGNOSIS — F25.0 SCHIZOAFFECTIVE DISORDER, BIPOLAR TYPE (MULTI): ICD-10-CM

## 2024-02-15 DIAGNOSIS — Z86.711 HISTORY OF PULMONARY EMBOLISM: ICD-10-CM

## 2024-02-15 PROCEDURE — 99305 1ST NF CARE MODERATE MDM 35: CPT | Performed by: INTERNAL MEDICINE

## 2024-02-15 PROCEDURE — 99497 ADVNCD CARE PLAN 30 MIN: CPT | Performed by: INTERNAL MEDICINE

## 2024-02-15 NOTE — LETTER
Patient: Gray Kim  : 1960    Encounter Date: 02/15/2024    Assessment and Plan:  Problem List Items Addressed This Visit       COPD mixed type (CMS/HCC)    Schizoaffective disorder, bipolar type (CMS/HCC)    Spinal stenosis of lumbar region without neurogenic claudication    Paroxysmal atrial fibrillation (CMS/HCC)    GERD without esophagitis    Hyperlipemia, mixed    History of pulmonary embolism    C. difficile colitis - Primary    Displaced fracture of distal phalanx of left little finger, initial encounter for closed fracture       Chronic pain osteopenia osteoarthritis lumbar cervical radiculopathy continue hydrocodone PT OT pain management    SVT continue Xarelto watch for bleeding    Nausea Zofran    Behavior problem Depakote    Bipolar Geodon    Anxiety BuSpar    Gastritis omeprazole    Insomnia trazodone    C. difficile colitis vancomycin    COPD Trelegy albuterol    Review lab chest x-ray    Left pinky finger splint Charles finger on the digit fourth and fifth left hand with the hand therapy    Outpatient GI pulmonary orthopedic follow-up    At age of 63 female skin cancer breast cancer colon cancer lung cancer screening flu pneumonia COVID-19 vaccines follow-up with PCP  Chief Complaint:   Left hand pain diarrhea shortness of breath chronic pain    HPI:  Transfer from the hospital and nursing home    History of the left hand pain fracture finger C. difficile colitis with the decreased ADL decreased mobility admitted for PT OT rehab and pain management    Comorbid condition    Chronic pain syndrome on dependency narcotics    Behavior problem    GERD hiatal hernia    Nausea    Obesity    Insomnia    C. difficile colitis    Bipolar disorder    COPD    Social history ex-smoker nondrinker    Family history positive for mother have lung disease Father of heart disease    Review hospital record    Ultrasound of the pelvis negative    Lab    WBC 8.37 H&H 12.7 and 41.2 platelet 267    Potassium  4.3    CO2 40    Protein 5.3    LFT normal            Patient Active Problem List:  Patient Active Problem List   Diagnosis   • COPD mixed type (CMS/[facility])   • Schizoaffective disorder, bipolar type (CMS/[facility])   • Spinal stenosis of lumbar region without neurogenic claudication   • Paroxysmal atrial fibrillation (CMS/[facility])   • GERD without esophagitis   • Hyperlipemia, mixed   • History of pulmonary embolism   • C. difficile colitis   • Displaced fracture of distal phalanx of left little finger, initial encounter for closed fracture          Comprehensive Medical/Surgical/Social/Family History:  No family history on file.    Past Medical History:   Diagnosis Date   • Bipolar disorder, currently in remission, most recent episode unspecified (CMS/[facility]) 02/11/2021    History of depressed bipolar disorder       History reviewed. No pertinent surgical history.    Social History     Socioeconomic History   • Marital status: Single     Spouse name: None   • Number of children: None   • Years of education: None   • Highest education level: None   Occupational History   • None   Tobacco Use   • Smoking status: Former     Types: Cigarettes   • Smokeless tobacco: Never   Substance and Sexual Activity   • Alcohol use: Not Currently   • Drug use: Not Currently   • Sexual activity: None   Other Topics Concern   • None   Social History Narrative   • None     Social Determinants of Health     Financial Resource Strain: Not on file   Food Insecurity: Not on file   Transportation Needs: Not on file   Physical Activity: Not on file   Stress: Not on file   Social Connections: Not on file   Intimate Partner Violence: Not on file   Housing Stability: Not on file       Tobacco/Alcohol/Opioid use, as well as Illicit Drug Use was screened for/reviewed and documented in Social Documentation section of the chart and medication list as appropriate    Allergies and Medications  Not on File  No current outpatient medications on file.     No current  facility-administered medications for this visit.       Medications and Supplements  prescribed by me and other practitioners or clinical pharmacist (such as prescriptions, OTC's, herbal therapies and supplements) were reviewed and documented in the medical record.     Advance directives  Advanced Care Planning (including a Living Will, Healthcare POA, as well as specific end of life choices and/or directives), was discussed for approximately 16 minutes with the patient and/or surrogate, voluntarily, and documented in the Problem List of the medical record.     Cardiac Risk Assessment  Cardiovascular risk was discussed and, if needed, lifestyle modifications recommended, including nutritional choices, exercise, and elimination of habits contributing to risk. We agreed on a plan to reduce the current cardiovascular risk based on above discussion as needed.  Aspirin use/disuse was discussed and documented in the Problem List of the medical record after reviewing the updated guidelines below:    Consider low dose Aspirin ( mg) use if the benefit for cardiovascular disease prevention outweighs risk for bleeding complications.   In general, low dose ASA should be considered:  In patients WITHOUT prior MI/stroke/PAD (primary prevention):   a. Age <60: Use if 10-year cardiovascular disease risk >20%, with discussion of risks and benefits with patient  b. Age 60-<70: Use if 10-year cardiovascular disease risk >20% and low bleeding (e.g., gastrointenstinal) risk, with discussion of risks and benefits with patient  c. Age >=70: Do not use    In patients WITH prior MI/stroke/PAD (secondary prevention):   Generally use unless extremely high bleeding (e.g., gastrointenstinal) risk, with discussion of risks and benefits with patient    ROS otherwise negative aside from what was mentioned above in HPI.    Visit Vitals  Smoking Status Former     Current Vitals     BP: 116/77 mmHg  2/16/2024 21:59    Temp:97.1 °F  2/16/2024  21:59  Pulse:88 bpm  2/16/2024 21:59  Weight:290.4 Lbs  1/4/2024 13:34  Resp:18 Breaths/min  2/16/2024 21:59  BS:97 mg/dL  2/3/2023 07:34  O2:99 %  2/16/2024 22:45  Pain:3  2/17/2024 08:18    Physical Exam  Physical Exam:    Appearance: Alert, oriented , cooperative,  in no acute distress. Well nourished & well hydrated.    Skin: Intact,  dry skin, no lesions, rash, petechiae or purpura.     Eyes: PERRLA, EOMs intact,  Conjunctiva pink with no redness or exudates. Cornea & anterior chamber are clear, Eyelids without lesions. No scleral icterus.     ENT: Hearing grossly intact. External auditory canals patent, tympanic membranes intact with visible landmarks. Nares patent, mucus membranes moist. Dentition without lesions. Pharynx clear, uvula midline.     Neck: Supple, without meningismus. Thyroid not palpable. Trachea at midline. No lymphadenopathy.    Pulmonary: Decreased air entry crackles rales rhonchi    Cardiac: Heart murmur.    Abdomen: Left lower quadrant tenderness.    Genitourinary: Exam deferred.    Musculoskeletal: Arthritis of spine and hip  Neurological: Lumbar radiculopathy left fifth finger tenderness    Psychiatric: Appropriate mood and affect.     Voluntary discuss with patient about Advance care planning DO NOT RESUSCITATE I  spent more than 18 minutes discussing advanced Planning including an explanation and discussion of advanced directives discussed about a living will and power of  patient was encouraged to work on completing this documentation options are1= DO NOT RESUSCITATE CC2=, DO NOT RESUSCITATE  at arrest,3= full code documented in the chart.    During the course of the visit the patient was educated and counseled about age appropriate screening and preventive services. Completed preventive screenings were documented in the chart and orders were placed for outstanding screenings/procedures as documented in the Assessment and Plan.    Patient Instructions (the written plan) was  given to the patient at check out.    Charting was completed using voice recognition technology and may include unintended errors.    Electronically Signed By: Gil Disla MD   2/17/24  8:48 AM

## 2024-02-17 PROBLEM — N39.0 UTI (URINARY TRACT INFECTION), UNCOMPLICATED: Status: RESOLVED | Noted: 2024-01-13 | Resolved: 2024-02-17

## 2024-02-17 PROBLEM — S62.637A: Status: ACTIVE | Noted: 2024-02-17

## 2024-02-17 PROBLEM — Z12.31 ENCOUNTER FOR SCREENING MAMMOGRAM FOR MALIGNANT NEOPLASM OF BREAST: Status: RESOLVED | Noted: 2024-01-13 | Resolved: 2024-02-17

## 2024-02-17 PROBLEM — A04.72 C. DIFFICILE COLITIS: Status: ACTIVE | Noted: 2024-02-17

## 2024-02-17 NOTE — PROGRESS NOTES
Assessment and Plan:  Problem List Items Addressed This Visit       COPD mixed type (CMS/HCC)    Schizoaffective disorder, bipolar type (CMS/HCC)    Spinal stenosis of lumbar region without neurogenic claudication    Paroxysmal atrial fibrillation (CMS/HCC)    GERD without esophagitis    Hyperlipemia, mixed    History of pulmonary embolism    C. difficile colitis - Primary    Displaced fracture of distal phalanx of left little finger, initial encounter for closed fracture       Chronic pain osteopenia osteoarthritis lumbar cervical radiculopathy continue hydrocodone PT OT pain management    SVT continue Xarelto watch for bleeding    Nausea Zofran    Behavior problem Depakote    Bipolar Geodon    Anxiety BuSpar    Gastritis omeprazole    Insomnia trazodone    C. difficile colitis vancomycin    COPD Trelegy albuterol    Review lab chest x-ray    Left pinky finger splint Charles finger on the digit fourth and fifth left hand with the hand therapy    Outpatient GI pulmonary orthopedic follow-up    At age of 63 female skin cancer breast cancer colon cancer lung cancer screening flu pneumonia COVID-19 vaccines follow-up with PCP  Chief Complaint:   Left hand pain diarrhea shortness of breath chronic pain    HPI:  Transfer from the hospital and nursing home    History of the left hand pain fracture finger C. difficile colitis with the decreased ADL decreased mobility admitted for PT OT rehab and pain management    Comorbid condition    Chronic pain syndrome on dependency narcotics    Behavior problem    GERD hiatal hernia    Nausea    Obesity    Insomnia    C. difficile colitis    Bipolar disorder    COPD    Social history ex-smoker nondrinker    Family history positive for mother have lung disease Father of heart disease    Review hospital record    Ultrasound of the pelvis negative    Lab    WBC 8.37 H&H 12.7 and 41.2 platelet 267    Potassium 4.3    CO2 40    Protein 5.3    LFT normal            Patient Active Problem  List:  Patient Active Problem List   Diagnosis    COPD mixed type (CMS/[facility])    Schizoaffective disorder, bipolar type (CMS/[facility])    Spinal stenosis of lumbar region without neurogenic claudication    Paroxysmal atrial fibrillation (CMS/[facility])    GERD without esophagitis    Hyperlipemia, mixed    History of pulmonary embolism    C. difficile colitis    Displaced fracture of distal phalanx of left little finger, initial encounter for closed fracture          Comprehensive Medical/Surgical/Social/Family History:  No family history on file.    Past Medical History:   Diagnosis Date    Bipolar disorder, currently in remission, most recent episode unspecified (CMS/[facility]) 02/11/2021    History of depressed bipolar disorder       History reviewed. No pertinent surgical history.    Social History     Socioeconomic History    Marital status: Single     Spouse name: None    Number of children: None    Years of education: None    Highest education level: None   Occupational History    None   Tobacco Use    Smoking status: Former     Types: Cigarettes    Smokeless tobacco: Never   Substance and Sexual Activity    Alcohol use: Not Currently    Drug use: Not Currently    Sexual activity: None   Other Topics Concern    None   Social History Narrative    None     Social Determinants of Health     Financial Resource Strain: Not on file   Food Insecurity: Not on file   Transportation Needs: Not on file   Physical Activity: Not on file   Stress: Not on file   Social Connections: Not on file   Intimate Partner Violence: Not on file   Housing Stability: Not on file       Tobacco/Alcohol/Opioid use, as well as Illicit Drug Use was screened for/reviewed and documented in Social Documentation section of the chart and medication list as appropriate    Allergies and Medications  Not on File  No current outpatient medications on file.     No current facility-administered medications for this visit.       Medications and Supplements  prescribed by  me and other practitioners or clinical pharmacist (such as prescriptions, OTC's, herbal therapies and supplements) were reviewed and documented in the medical record.     Advance directives  Advanced Care Planning (including a Living Will, Healthcare POA, as well as specific end of life choices and/or directives), was discussed for approximately 16 minutes with the patient and/or surrogate, voluntarily, and documented in the Problem List of the medical record.     Cardiac Risk Assessment  Cardiovascular risk was discussed and, if needed, lifestyle modifications recommended, including nutritional choices, exercise, and elimination of habits contributing to risk. We agreed on a plan to reduce the current cardiovascular risk based on above discussion as needed.  Aspirin use/disuse was discussed and documented in the Problem List of the medical record after reviewing the updated guidelines below:    Consider low dose Aspirin ( mg) use if the benefit for cardiovascular disease prevention outweighs risk for bleeding complications.   In general, low dose ASA should be considered:  In patients WITHOUT prior MI/stroke/PAD (primary prevention):   a. Age <60: Use if 10-year cardiovascular disease risk >20%, with discussion of risks and benefits with patient  b. Age 60-<70: Use if 10-year cardiovascular disease risk >20% and low bleeding (e.g., gastrointenstinal) risk, with discussion of risks and benefits with patient  c. Age >=70: Do not use    In patients WITH prior MI/stroke/PAD (secondary prevention):   Generally use unless extremely high bleeding (e.g., gastrointenstinal) risk, with discussion of risks and benefits with patient    ROS otherwise negative aside from what was mentioned above in HPI.    Visit Vitals  Smoking Status Former     Current Vitals     BP: 116/77 mmHg  2/16/2024 21:59    Temp:97.1 °F  2/16/2024 21:59  Pulse:88 bpm  2/16/2024 21:59  Weight:290.4 Lbs  1/4/2024 13:34  Resp:18  Breaths/min  2/16/2024 21:59  BS:97 mg/dL  2/3/2023 07:34  O2:99 %  2/16/2024 22:45  Pain:3  2/17/2024 08:18    Physical Exam  Physical Exam:    Appearance: Alert, oriented , cooperative,  in no acute distress. Well nourished & well hydrated.    Skin: Intact,  dry skin, no lesions, rash, petechiae or purpura.     Eyes: PERRLA, EOMs intact,  Conjunctiva pink with no redness or exudates. Cornea & anterior chamber are clear, Eyelids without lesions. No scleral icterus.     ENT: Hearing grossly intact. External auditory canals patent, tympanic membranes intact with visible landmarks. Nares patent, mucus membranes moist. Dentition without lesions. Pharynx clear, uvula midline.     Neck: Supple, without meningismus. Thyroid not palpable. Trachea at midline. No lymphadenopathy.    Pulmonary: Decreased air entry crackles rales rhonchi    Cardiac: Heart murmur.    Abdomen: Left lower quadrant tenderness.    Genitourinary: Exam deferred.    Musculoskeletal: Arthritis of spine and hip  Neurological: Lumbar radiculopathy left fifth finger tenderness    Psychiatric: Appropriate mood and affect.     Voluntary discuss with patient about Advance care planning DO NOT RESUSCITATE I  spent more than 18 minutes discussing advanced Planning including an explanation and discussion of advanced directives discussed about a living will and power of  patient was encouraged to work on completing this documentation options are1= DO NOT RESUSCITATE CC2=, DO NOT RESUSCITATE  at arrest,3= full code documented in the chart.    During the course of the visit the patient was educated and counseled about age appropriate screening and preventive services. Completed preventive screenings were documented in the chart and orders were placed for outstanding screenings/procedures as documented in the Assessment and Plan.    Patient Instructions (the written plan) was given to the patient at check out.    Charting was completed using voice  recognition technology and may include unintended errors.

## 2024-02-24 ENCOUNTER — NURSING HOME VISIT (OUTPATIENT)
Dept: POST ACUTE CARE | Facility: EXTERNAL LOCATION | Age: 64
End: 2024-02-24
Payer: COMMERCIAL

## 2024-02-24 DIAGNOSIS — M48.061 SPINAL STENOSIS OF LUMBAR REGION WITHOUT NEUROGENIC CLAUDICATION: ICD-10-CM

## 2024-02-24 DIAGNOSIS — J44.9 COPD MIXED TYPE (MULTI): Primary | ICD-10-CM

## 2024-02-24 DIAGNOSIS — I48.0 PAROXYSMAL ATRIAL FIBRILLATION (MULTI): ICD-10-CM

## 2024-02-24 DIAGNOSIS — K21.9 GERD WITHOUT ESOPHAGITIS: ICD-10-CM

## 2024-02-24 DIAGNOSIS — E66.01 MORBID OBESITY (MULTI): ICD-10-CM

## 2024-02-24 DIAGNOSIS — F25.0 SCHIZOAFFECTIVE DISORDER, BIPOLAR TYPE (MULTI): ICD-10-CM

## 2024-02-24 PROCEDURE — 99308 SBSQ NF CARE LOW MDM 20: CPT | Performed by: INTERNAL MEDICINE

## 2024-02-24 NOTE — LETTER
Patient: Gray Kim  : 1960    Encounter Date: 2024    Assessment and Plan:  Problem List Items Addressed This Visit       COPD mixed type (CMS/HCC) - Primary    Schizoaffective disorder, bipolar type (CMS/HCC)    Spinal stenosis of lumbar region without neurogenic claudication    Paroxysmal atrial fibrillation (CMS/HCC)    GERD without esophagitis    Morbid obesity (CMS/HCC)     Chronic pain osteopenia osteoarthritis lumbar cervical radiculopathy continue hydrocodone PT OT pain management    SVT continue Xarelto watch for bleeding    Nausea Zofran    Behavior problem Depakote    Bipolar Geodon    Anxiety BuSpar    Gastritis omeprazole    Insomnia trazodone    C. difficile colitis vancomycin    COPD Trelegy albuterol    Review lab chest x-ray    Left pinky finger splint Charles finger on the digit fourth and fifth left hand with the hand therapy    Outpatient GI pulmonary orthopedic follow-up    At age of 63 female skin cancer breast cancer colon cancer lung cancer screening flu pneumonia COVID-19 vaccines follow-up with PCP  Chief Complaint:   Left hand pain diarrhea shortness of breath chronic pain    HPI:    63-year-old patient who with COPD recently evaluated by the psych for anxiety continue cough congestion dyspnea moderate pain and discomfort advised continue oxygen intensive spirometry hydrocodone Spiriva DuoNeb aerosol treatment    Chronic narcotic dependency advised chemical dependency psych evaluation mild protein calorie malnutrition diet dietitian evaluation  Comorbid condition    Chronic pain syndrome on dependency narcotics    Behavior problem    GERD hiatal hernia    Nausea    Obesity    Insomnia    C. difficile colitis    Bipolar disorder    COPD    Social history ex-smoker nondrinker    Family history positive for mother have lung disease Father of heart disease    Review hospital record    Ultrasound of the pelvis negative    Lab    WBC 8.37 H&H 12.7 and 41.2 platelet  267    Potassium 4.3    CO2 40    Protein 5.3    LFT normal    Patient Active Problem List:  Patient Active Problem List   Diagnosis   • COPD mixed type (CMS/[facility])   • Schizoaffective disorder, bipolar type (CMS/[facility])   • Spinal stenosis of lumbar region without neurogenic claudication   • Paroxysmal atrial fibrillation (CMS/[facility])   • GERD without esophagitis   • Hyperlipemia, mixed   • Morbid obesity (CMS/[facility])   • History of pulmonary embolism   • C. difficile colitis   • Displaced fracture of distal phalanx of left little finger, initial encounter for closed fracture          Comprehensive Medical/Surgical/Social/Family History:  No family history on file.    Past Medical History:   Diagnosis Date   • Bipolar disorder, currently in remission, most recent episode unspecified (CMS/[facility]) 02/11/2021    History of depressed bipolar disorder       History reviewed. No pertinent surgical history.    Social History     Socioeconomic History   • Marital status: Single     Spouse name: None   • Number of children: None   • Years of education: None   • Highest education level: None   Occupational History   • None   Tobacco Use   • Smoking status: Former     Types: Cigarettes   • Smokeless tobacco: Never   Substance and Sexual Activity   • Alcohol use: Not Currently   • Drug use: Not Currently   • Sexual activity: None   Other Topics Concern   • None   Social History Narrative   • None     Social Determinants of Health     Financial Resource Strain: Not on file   Food Insecurity: Not on file   Transportation Needs: Not on file   Physical Activity: Not on file   Stress: Not on file   Social Connections: Not on file   Intimate Partner Violence: Not on file   Housing Stability: Not on file       Tobacco/Alcohol/Opioid use, as well as Illicit Drug Use was screened for/reviewed and documented in Social Documentation section of the chart and medication list as appropriate    Allergies and Medications  Not on File  No current outpatient  medications on file.     No current facility-administered medications for this visit.       Medications and Supplements  prescribed by me and other practitioners or clinical pharmacist (such as prescriptions, OTC's, herbal therapies and supplements) were reviewed and documented in the medical record.     Advance directives  Advanced Care Planning (including a Living Will, Healthcare POA, as well as specific end of life choices and/or directives), was discussed for approximately 16 minutes with the patient and/or surrogate, voluntarily, and documented in the Problem List of the medical record.     Cardiac Risk Assessment  Cardiovascular risk was discussed and, if needed, lifestyle modifications recommended, including nutritional choices, exercise, and elimination of habits contributing to risk. We agreed on a plan to reduce the current cardiovascular risk based on above discussion as needed.  Aspirin use/disuse was discussed and documented in the Problem List of the medical record after reviewing the updated guidelines below:    Consider low dose Aspirin ( mg) use if the benefit for cardiovascular disease prevention outweighs risk for bleeding complications.   In general, low dose ASA should be considered:  In patients WITHOUT prior MI/stroke/PAD (primary prevention):   a. Age <60: Use if 10-year cardiovascular disease risk >20%, with discussion of risks and benefits with patient  b. Age 60-<70: Use if 10-year cardiovascular disease risk >20% and low bleeding (e.g., gastrointenstinal) risk, with discussion of risks and benefits with patient  c. Age >=70: Do not use    In patients WITH prior MI/stroke/PAD (secondary prevention):   Generally use unless extremely high bleeding (e.g., gastrointenstinal) risk, with discussion of risks and benefits with patient    ROS otherwise negative aside from what was mentioned above in HPI.    Visit Vitals  Smoking Status Former     Physical Exam  Current Vitals     BP: 133/96  mmHg  2/24/2024 05:41    Temp:96.6 °F  2/24/2024 05:41    Pulse:104 bpm  2/24/2024 05:41    Weight:268.6 Lbs  2/19/2024 11:22  Resp:20 Breaths/min  2/24/2024 05:41  BS:97 mg/dL  2/3/2023 07:34  O2:90 %  2/24/2024 05:42  Pain:0  2/24/2024 08:17  Appearance: Alert, oriented , cooperative,  in no acute distress. Well nourished & well hydrated.    Skin: Intact,  dry skin, no lesions, rash, petechiae or purpura.     Eyes: Cataract    ENT: Minimal wax    Neck: Supple, without meningismus. Thyroid not palpable. Trachea at midline. No lymphadenopathy.    Pulmonary: Decreased air entry crackles rales rhonchi    Cardiac: Heart murmur.    Abdomen: Left lower quadrant tenderness.    Genitourinary: Exam deferred.    Musculoskeletal: Arthritis of spine and hip  Neurological: Lumbar radiculopathy left fifth finger tenderness    Psychiatric: Appropriate mood and affect.     Voluntary discuss with patient about Advance care planning DO NOT RESUSCITATE I  spent more than 18 minutes discussing advanced Planning including an explanation and discussion of advanced directives discussed about a living will and power of  patient was encouraged to work on completing this documentation options are1= DO NOT RESUSCITATE CC2=, DO NOT RESUSCITATE  at arrest,3= full code documented in the chart.    During the course of the visit the patient was educated and counseled about age appropriate screening and preventive services. Completed preventive screenings were documented in the chart and orders were placed for outstanding screenings/procedures as documented in the Assessment and Plan.    Patient Instructions (the written plan) was given to the patient at check out.    Charting was completed using voice recognition technology and may include unintended errors.      Electronically Signed By: Gil Disla MD   2/24/24 12:29 PM

## 2024-02-24 NOTE — PROGRESS NOTES
Assessment and Plan:  Problem List Items Addressed This Visit       COPD mixed type (CMS/HCC) - Primary    Schizoaffective disorder, bipolar type (CMS/HCC)    Spinal stenosis of lumbar region without neurogenic claudication    Paroxysmal atrial fibrillation (CMS/HCC)    GERD without esophagitis    Morbid obesity (CMS/HCC)     Chronic pain osteopenia osteoarthritis lumbar cervical radiculopathy continue hydrocodone PT OT pain management    SVT continue Xarelto watch for bleeding    Nausea Zofran    Behavior problem Depakote    Bipolar Geodon    Anxiety BuSpar    Gastritis omeprazole    Insomnia trazodone    C. difficile colitis vancomycin    COPD Trelegy albuterol    Review lab chest x-ray    Left pinky finger splint Charles finger on the digit fourth and fifth left hand with the hand therapy    Outpatient GI pulmonary orthopedic follow-up    At age of 63 female skin cancer breast cancer colon cancer lung cancer screening flu pneumonia COVID-19 vaccines follow-up with PCP  Chief Complaint:   Left hand pain diarrhea shortness of breath chronic pain    HPI:    63-year-old patient who with COPD recently evaluated by the psych for anxiety continue cough congestion dyspnea moderate pain and discomfort advised continue oxygen intensive spirometry hydrocodone Spiriva DuoNeb aerosol treatment    Chronic narcotic dependency advised chemical dependency psych evaluation mild protein calorie malnutrition diet dietitian evaluation  Comorbid condition    Chronic pain syndrome on dependency narcotics    Behavior problem    GERD hiatal hernia    Nausea    Obesity    Insomnia    C. difficile colitis    Bipolar disorder    COPD    Social history ex-smoker nondrinker    Family history positive for mother have lung disease Father of heart disease    Review hospital record    Ultrasound of the pelvis negative    Lab    WBC 8.37 H&H 12.7 and 41.2 platelet 267    Potassium 4.3    CO2 40    Protein 5.3    LFT normal    Patient Active Problem  List:  Patient Active Problem List   Diagnosis    COPD mixed type (CMS/[facility])    Schizoaffective disorder, bipolar type (CMS/[facility])    Spinal stenosis of lumbar region without neurogenic claudication    Paroxysmal atrial fibrillation (CMS/[facility])    GERD without esophagitis    Hyperlipemia, mixed    Morbid obesity (CMS/[facility])    History of pulmonary embolism    C. difficile colitis    Displaced fracture of distal phalanx of left little finger, initial encounter for closed fracture          Comprehensive Medical/Surgical/Social/Family History:  No family history on file.    Past Medical History:   Diagnosis Date    Bipolar disorder, currently in remission, most recent episode unspecified (CMS/[facility]) 02/11/2021    History of depressed bipolar disorder       History reviewed. No pertinent surgical history.    Social History     Socioeconomic History    Marital status: Single     Spouse name: None    Number of children: None    Years of education: None    Highest education level: None   Occupational History    None   Tobacco Use    Smoking status: Former     Types: Cigarettes    Smokeless tobacco: Never   Substance and Sexual Activity    Alcohol use: Not Currently    Drug use: Not Currently    Sexual activity: None   Other Topics Concern    None   Social History Narrative    None     Social Determinants of Health     Financial Resource Strain: Not on file   Food Insecurity: Not on file   Transportation Needs: Not on file   Physical Activity: Not on file   Stress: Not on file   Social Connections: Not on file   Intimate Partner Violence: Not on file   Housing Stability: Not on file       Tobacco/Alcohol/Opioid use, as well as Illicit Drug Use was screened for/reviewed and documented in Social Documentation section of the chart and medication list as appropriate    Allergies and Medications  Not on File  No current outpatient medications on file.     No current facility-administered medications for this visit.       Medications and  Supplements  prescribed by me and other practitioners or clinical pharmacist (such as prescriptions, OTC's, herbal therapies and supplements) were reviewed and documented in the medical record.     Advance directives  Advanced Care Planning (including a Living Will, Healthcare POA, as well as specific end of life choices and/or directives), was discussed for approximately 16 minutes with the patient and/or surrogate, voluntarily, and documented in the Problem List of the medical record.     Cardiac Risk Assessment  Cardiovascular risk was discussed and, if needed, lifestyle modifications recommended, including nutritional choices, exercise, and elimination of habits contributing to risk. We agreed on a plan to reduce the current cardiovascular risk based on above discussion as needed.  Aspirin use/disuse was discussed and documented in the Problem List of the medical record after reviewing the updated guidelines below:    Consider low dose Aspirin ( mg) use if the benefit for cardiovascular disease prevention outweighs risk for bleeding complications.   In general, low dose ASA should be considered:  In patients WITHOUT prior MI/stroke/PAD (primary prevention):   a. Age <60: Use if 10-year cardiovascular disease risk >20%, with discussion of risks and benefits with patient  b. Age 60-<70: Use if 10-year cardiovascular disease risk >20% and low bleeding (e.g., gastrointenstinal) risk, with discussion of risks and benefits with patient  c. Age >=70: Do not use    In patients WITH prior MI/stroke/PAD (secondary prevention):   Generally use unless extremely high bleeding (e.g., gastrointenstinal) risk, with discussion of risks and benefits with patient    ROS otherwise negative aside from what was mentioned above in HPI.    Visit Vitals  Smoking Status Former     Physical Exam  Current Vitals     BP: 133/96 mmHg  2/24/2024 05:41    Temp:96.6 °F  2/24/2024 05:41    Pulse:104 bpm  2/24/2024 05:41    Weight:268.6  Lbs  2/19/2024 11:22  Resp:20 Breaths/min  2/24/2024 05:41  BS:97 mg/dL  2/3/2023 07:34  O2:90 %  2/24/2024 05:42  Pain:0  2/24/2024 08:17  Appearance: Alert, oriented , cooperative,  in no acute distress. Well nourished & well hydrated.    Skin: Intact,  dry skin, no lesions, rash, petechiae or purpura.     Eyes: Cataract    ENT: Minimal wax    Neck: Supple, without meningismus. Thyroid not palpable. Trachea at midline. No lymphadenopathy.    Pulmonary: Decreased air entry crackles rales rhonchi    Cardiac: Heart murmur.    Abdomen: Left lower quadrant tenderness.    Genitourinary: Exam deferred.    Musculoskeletal: Arthritis of spine and hip  Neurological: Lumbar radiculopathy left fifth finger tenderness    Psychiatric: Appropriate mood and affect.     Voluntary discuss with patient about Advance care planning DO NOT RESUSCITATE I  spent more than 18 minutes discussing advanced Planning including an explanation and discussion of advanced directives discussed about a living will and power of  patient was encouraged to work on completing this documentation options are1= DO NOT RESUSCITATE CC2=, DO NOT RESUSCITATE  at arrest,3= full code documented in the chart.    During the course of the visit the patient was educated and counseled about age appropriate screening and preventive services. Completed preventive screenings were documented in the chart and orders were placed for outstanding screenings/procedures as documented in the Assessment and Plan.    Patient Instructions (the written plan) was given to the patient at check out.    Charting was completed using voice recognition technology and may include unintended errors.

## 2024-03-09 ENCOUNTER — APPOINTMENT (OUTPATIENT)
Dept: CARDIOLOGY | Facility: HOSPITAL | Age: 64
End: 2024-03-09
Payer: COMMERCIAL

## 2024-03-09 ENCOUNTER — HOSPITAL ENCOUNTER (EMERGENCY)
Facility: HOSPITAL | Age: 64
Discharge: HOME | End: 2024-03-09
Attending: EMERGENCY MEDICINE
Payer: COMMERCIAL

## 2024-03-09 ENCOUNTER — APPOINTMENT (OUTPATIENT)
Dept: RADIOLOGY | Facility: HOSPITAL | Age: 64
End: 2024-03-09
Payer: COMMERCIAL

## 2024-03-09 VITALS
TEMPERATURE: 97.3 F | HEART RATE: 94 BPM | WEIGHT: 293 LBS | DIASTOLIC BLOOD PRESSURE: 89 MMHG | HEIGHT: 61 IN | SYSTOLIC BLOOD PRESSURE: 184 MMHG | BODY MASS INDEX: 55.32 KG/M2 | OXYGEN SATURATION: 94 % | RESPIRATION RATE: 20 BRPM

## 2024-03-09 DIAGNOSIS — I89.0 LYMPHEDEMA: Primary | ICD-10-CM

## 2024-03-09 LAB
ALBUMIN SERPL BCP-MCNC: 3.7 G/DL (ref 3.4–5)
ALP SERPL-CCNC: 57 U/L (ref 33–136)
ALT SERPL W P-5'-P-CCNC: 9 U/L (ref 7–45)
ANION GAP SERPL CALC-SCNC: 12 MMOL/L (ref 10–20)
AST SERPL W P-5'-P-CCNC: 8 U/L (ref 9–39)
BASOPHILS # BLD AUTO: 0.01 X10*3/UL (ref 0–0.1)
BASOPHILS NFR BLD AUTO: 0.2 %
BILIRUB SERPL-MCNC: 0.5 MG/DL (ref 0–1.2)
BNP SERPL-MCNC: 60 PG/ML (ref 0–99)
BUN SERPL-MCNC: 8 MG/DL (ref 6–23)
CALCIUM SERPL-MCNC: 10 MG/DL (ref 8.6–10.3)
CARDIAC TROPONIN I PNL SERPL HS: 3 NG/L (ref 0–13)
CHLORIDE SERPL-SCNC: 96 MMOL/L (ref 98–107)
CO2 SERPL-SCNC: 32 MMOL/L (ref 21–32)
CREAT SERPL-MCNC: 0.66 MG/DL (ref 0.5–1.05)
CRP SERPL-MCNC: 0.68 MG/DL
EGFRCR SERPLBLD CKD-EPI 2021: >90 ML/MIN/1.73M*2
EOSINOPHIL # BLD AUTO: 0.08 X10*3/UL (ref 0–0.7)
EOSINOPHIL NFR BLD AUTO: 1.3 %
ERYTHROCYTE [DISTWIDTH] IN BLOOD BY AUTOMATED COUNT: 15.4 % (ref 11.5–14.5)
GLUCOSE SERPL-MCNC: 90 MG/DL (ref 74–99)
HCT VFR BLD AUTO: 38.2 % (ref 36–46)
HGB BLD-MCNC: 12.2 G/DL (ref 12–16)
IMM GRANULOCYTES # BLD AUTO: 0.16 X10*3/UL (ref 0–0.7)
IMM GRANULOCYTES NFR BLD AUTO: 2.6 % (ref 0–0.9)
LACTATE SERPL-SCNC: 1.8 MMOL/L (ref 0.4–2)
LYMPHOCYTES # BLD AUTO: 1.22 X10*3/UL (ref 1.2–4.8)
LYMPHOCYTES NFR BLD AUTO: 20.1 %
MCH RBC QN AUTO: 29.6 PG (ref 26–34)
MCHC RBC AUTO-ENTMCNC: 31.9 G/DL (ref 32–36)
MCV RBC AUTO: 93 FL (ref 80–100)
MONOCYTES # BLD AUTO: 0.77 X10*3/UL (ref 0.1–1)
MONOCYTES NFR BLD AUTO: 12.7 %
NEUTROPHILS # BLD AUTO: 3.84 X10*3/UL (ref 1.2–7.7)
NEUTROPHILS NFR BLD AUTO: 63.1 %
NRBC BLD-RTO: 0 /100 WBCS (ref 0–0)
PLATELET # BLD AUTO: 232 X10*3/UL (ref 150–450)
POTASSIUM SERPL-SCNC: 4.5 MMOL/L (ref 3.5–5.3)
PROT SERPL-MCNC: 6.5 G/DL (ref 6.4–8.2)
RBC # BLD AUTO: 4.12 X10*6/UL (ref 4–5.2)
SODIUM SERPL-SCNC: 135 MMOL/L (ref 136–145)
WBC # BLD AUTO: 6.1 X10*3/UL (ref 4.4–11.3)

## 2024-03-09 PROCEDURE — 93005 ELECTROCARDIOGRAM TRACING: CPT

## 2024-03-09 PROCEDURE — 2500000001 HC RX 250 WO HCPCS SELF ADMINISTERED DRUGS (ALT 637 FOR MEDICARE OP): Performed by: EMERGENCY MEDICINE

## 2024-03-09 PROCEDURE — 85025 COMPLETE CBC W/AUTO DIFF WBC: CPT | Performed by: EMERGENCY MEDICINE

## 2024-03-09 PROCEDURE — 86140 C-REACTIVE PROTEIN: CPT | Performed by: EMERGENCY MEDICINE

## 2024-03-09 PROCEDURE — 36415 COLL VENOUS BLD VENIPUNCTURE: CPT | Performed by: EMERGENCY MEDICINE

## 2024-03-09 PROCEDURE — 2500000005 HC RX 250 GENERAL PHARMACY W/O HCPCS: Performed by: EMERGENCY MEDICINE

## 2024-03-09 PROCEDURE — 80053 COMPREHEN METABOLIC PANEL: CPT | Performed by: EMERGENCY MEDICINE

## 2024-03-09 PROCEDURE — 84484 ASSAY OF TROPONIN QUANT: CPT | Performed by: EMERGENCY MEDICINE

## 2024-03-09 PROCEDURE — 71045 X-RAY EXAM CHEST 1 VIEW: CPT | Performed by: RADIOLOGY

## 2024-03-09 PROCEDURE — 99283 EMERGENCY DEPT VISIT LOW MDM: CPT | Mod: 25

## 2024-03-09 PROCEDURE — 71045 X-RAY EXAM CHEST 1 VIEW: CPT

## 2024-03-09 PROCEDURE — 83880 ASSAY OF NATRIURETIC PEPTIDE: CPT | Performed by: EMERGENCY MEDICINE

## 2024-03-09 PROCEDURE — 83605 ASSAY OF LACTIC ACID: CPT | Performed by: EMERGENCY MEDICINE

## 2024-03-09 RX ORDER — HYDROCODONE BITARTRATE AND ACETAMINOPHEN 5; 325 MG/1; MG/1
1 TABLET ORAL ONCE
Status: COMPLETED | OUTPATIENT
Start: 2024-03-09 | End: 2024-03-09

## 2024-03-09 RX ADMIN — HYDROCODONE BITARTRATE AND ACETAMINOPHEN 1 TABLET: 5; 325 TABLET ORAL at 05:39

## 2024-03-09 RX ADMIN — Medication 2 L/MIN: at 06:17

## 2024-03-09 ASSESSMENT — LIFESTYLE VARIABLES
EVER HAD A DRINK FIRST THING IN THE MORNING TO STEADY YOUR NERVES TO GET RID OF A HANGOVER: NO
HAVE PEOPLE ANNOYED YOU BY CRITICIZING YOUR DRINKING: NO
EVER FELT BAD OR GUILTY ABOUT YOUR DRINKING: NO
HAVE YOU EVER FELT YOU SHOULD CUT DOWN ON YOUR DRINKING: NO

## 2024-03-09 ASSESSMENT — COLUMBIA-SUICIDE SEVERITY RATING SCALE - C-SSRS
2. HAVE YOU ACTUALLY HAD ANY THOUGHTS OF KILLING YOURSELF?: NO
1. IN THE PAST MONTH, HAVE YOU WISHED YOU WERE DEAD OR WISHED YOU COULD GO TO SLEEP AND NOT WAKE UP?: NO
6. HAVE YOU EVER DONE ANYTHING, STARTED TO DO ANYTHING, OR PREPARED TO DO ANYTHING TO END YOUR LIFE?: NO

## 2024-03-09 ASSESSMENT — PAIN DESCRIPTION - PAIN TYPE: TYPE: ACUTE PAIN;CHRONIC PAIN

## 2024-03-09 ASSESSMENT — PAIN DESCRIPTION - LOCATION: LOCATION: LEG

## 2024-03-09 ASSESSMENT — PAIN - FUNCTIONAL ASSESSMENT: PAIN_FUNCTIONAL_ASSESSMENT: 0-10

## 2024-03-09 ASSESSMENT — PAIN DESCRIPTION - DESCRIPTORS: DESCRIPTORS: BURNING;THROBBING

## 2024-03-09 ASSESSMENT — PAIN DESCRIPTION - ORIENTATION: ORIENTATION: RIGHT;LEFT

## 2024-03-09 ASSESSMENT — PAIN SCALES - GENERAL: PAINLEVEL_OUTOF10: 6

## 2024-03-10 NOTE — ED PROVIDER NOTES
HPI   Chief Complaint   Patient presents with    Leg Swelling     B/L LEG PAIN/REDNESS/SWELLING X2MO       Patient was sent in from skilled nursing facility for redness and edema bilateral legs.  He goes from the knees to the toes.  States she has a history of this but is worsened over the past several days.  Review of her chart she was started on doxycycline and Lasix yesterday.  She has no fever.  She has minimal pain in the area.  She denies any history of congestive heart failure.  She believes she does have sleep apnea but cannot tolerate CPAP or BiPAP.  She also has a history of COPD and obesity.  She has a history of bipolar disorder.                          No data recorded                   Patient History   Past Medical History:   Diagnosis Date    Bipolar disorder, currently in remission, most recent episode unspecified (CMS/[facility]) 02/11/2021    History of depressed bipolar disorder     No past surgical history on file.  No family history on file.  Social History     Tobacco Use    Smoking status: Former     Types: Cigarettes    Smokeless tobacco: Never   Substance Use Topics    Alcohol use: Not Currently    Drug use: Not Currently       Physical Exam   ED Triage Vitals   Temperature Heart Rate Respirations BP   03/09/24 0435 03/09/24 0435 03/09/24 0435 03/09/24 0435   36.3 °C (97.3 °F) (!) 115 20 (!) 174/107      Pulse Ox Temp src Heart Rate Source Patient Position   03/09/24 0435 -- 03/09/24 0630 03/09/24 0435   94 %  Monitor Sitting      BP Location FiO2 (%)     03/09/24 0435 --     Right arm        Physical Exam  Vitals and nursing note reviewed.   Constitutional:       General: She is not in acute distress.     Appearance: She is well-developed.   HENT:      Head: Normocephalic and atraumatic.   Eyes:      Conjunctiva/sclera: Conjunctivae normal.   Cardiovascular:      Rate and Rhythm: Normal rate and regular rhythm.      Heart sounds: No murmur heard.  Pulmonary:      Effort: Pulmonary effort is  normal. No respiratory distress.      Breath sounds: Normal breath sounds.   Abdominal:      Palpations: Abdomen is soft.      Tenderness: There is no abdominal tenderness.   Musculoskeletal:         General: No swelling.      Cervical back: Neck supple.   Skin:     General: Skin is warm and dry.      Capillary Refill: Capillary refill takes less than 2 seconds.      Comments: Light erythema of the legs.  2+ pitting edema.  There is mild blistering over the skin as well.   Neurological:      Mental Status: She is alert.   Psychiatric:         Mood and Affect: Mood normal.         ED Course & MDM   Diagnoses as of 03/09/24 2130   Lymphedema       Medical Decision Making  Differential diagnosis is lymphedema, venous stasis, cellulitis, etc.    Patient's white blood cell count and C-reactive protein are normal.  She has no fever.  I suspect this erythema is just secondary to the edema.  There is no other additional signs of infection.  She is not intravascularly volume overloaded.  Her chest x-ray does not show any signs of congestive heart failure.  She has no dyspnea.  States she wears oxygen occasionally at the nursing facility.  I discussed the case with the patient as well as her provider Dr. Petty and the patient was just started on doxycycline and Lasix yesterday.  They will continue with that management as an outpatient in the skilled nursing facility.  We will Ace wrap her legs as compression stockings will not fit her legs.  Prior medical records were reviewed.  She was told to return if the outpatient therapies are not working.        Procedure  Procedures     Nahun Camargo MD  03/09/24 2133

## 2024-03-11 ENCOUNTER — TELEPHONE (OUTPATIENT)
Dept: PRIMARY CARE | Facility: CLINIC | Age: 64
End: 2024-03-11
Payer: COMMERCIAL

## 2024-03-16 LAB
ATRIAL RATE: 106 BPM
P AXIS: 71 DEGREES
P OFFSET: 202 MS
P ONSET: 152 MS
PR INTERVAL: 134 MS
Q ONSET: 219 MS
QRS COUNT: 17 BEATS
QRS DURATION: 64 MS
QT INTERVAL: 332 MS
QTC CALCULATION(BAZETT): 441 MS
QTC FREDERICIA: 401 MS
R AXIS: 57 DEGREES
T AXIS: 55 DEGREES
T OFFSET: 385 MS
VENTRICULAR RATE: 106 BPM

## 2024-05-26 ENCOUNTER — APPOINTMENT (OUTPATIENT)
Dept: CARDIOLOGY | Facility: HOSPITAL | Age: 64
End: 2024-05-26
Payer: COMMERCIAL

## 2024-05-26 ENCOUNTER — HOSPITAL ENCOUNTER (OUTPATIENT)
Facility: HOSPITAL | Age: 64
Setting detail: OBSERVATION
Discharge: NURSING, REHAB OR LONG-TERM CARE | End: 2024-05-28
Attending: STUDENT IN AN ORGANIZED HEALTH CARE EDUCATION/TRAINING PROGRAM | Admitting: INTERNAL MEDICINE
Payer: COMMERCIAL

## 2024-05-26 ENCOUNTER — APPOINTMENT (OUTPATIENT)
Dept: RADIOLOGY | Facility: HOSPITAL | Age: 64
End: 2024-05-26
Payer: COMMERCIAL

## 2024-05-26 DIAGNOSIS — Z79.01 ANTICOAGULATED: ICD-10-CM

## 2024-05-26 DIAGNOSIS — R10.9 ABDOMINAL PAIN, UNSPECIFIED ABDOMINAL LOCATION: ICD-10-CM

## 2024-05-26 DIAGNOSIS — R93.5 ABNORMAL CT OF THE ABDOMEN: ICD-10-CM

## 2024-05-26 DIAGNOSIS — K92.2 GASTROINTESTINAL HEMORRHAGE, UNSPECIFIED GASTROINTESTINAL HEMORRHAGE TYPE: Primary | ICD-10-CM

## 2024-05-26 LAB
ABO GROUP (TYPE) IN BLOOD: NORMAL
ALBUMIN SERPL BCP-MCNC: 4.1 G/DL (ref 3.4–5)
ALP SERPL-CCNC: 76 U/L (ref 33–136)
ALT SERPL W P-5'-P-CCNC: 17 U/L (ref 7–45)
ANION GAP SERPL CALC-SCNC: 13 MMOL/L (ref 10–20)
ANTIBODY SCREEN: NORMAL
AST SERPL W P-5'-P-CCNC: 15 U/L (ref 9–39)
BASOPHILS # BLD AUTO: 0.02 X10*3/UL (ref 0–0.1)
BASOPHILS NFR BLD AUTO: 0.4 %
BILIRUB DIRECT SERPL-MCNC: 0.1 MG/DL (ref 0–0.3)
BILIRUB SERPL-MCNC: 0.5 MG/DL (ref 0–1.2)
BUN SERPL-MCNC: 25 MG/DL (ref 6–23)
CALCIUM SERPL-MCNC: 10.1 MG/DL (ref 8.6–10.3)
CHLORIDE SERPL-SCNC: 99 MMOL/L (ref 98–107)
CO2 SERPL-SCNC: 31 MMOL/L (ref 21–32)
CREAT SERPL-MCNC: 0.78 MG/DL (ref 0.5–1.05)
EGFRCR SERPLBLD CKD-EPI 2021: 87 ML/MIN/1.73M*2
EOSINOPHIL # BLD AUTO: 0.22 X10*3/UL (ref 0–0.7)
EOSINOPHIL NFR BLD AUTO: 3.9 %
ERYTHROCYTE [DISTWIDTH] IN BLOOD BY AUTOMATED COUNT: 13.4 % (ref 11.5–14.5)
GLUCOSE SERPL-MCNC: 95 MG/DL (ref 74–99)
HCT VFR BLD AUTO: 38.2 % (ref 36–46)
HGB BLD-MCNC: 12.3 G/DL (ref 12–16)
IMM GRANULOCYTES # BLD AUTO: 0.1 X10*3/UL (ref 0–0.7)
IMM GRANULOCYTES NFR BLD AUTO: 1.8 % (ref 0–0.9)
INR PPP: 1 (ref 0.9–1.1)
LACTATE SERPL-SCNC: 1.7 MMOL/L (ref 0.4–2)
LYMPHOCYTES # BLD AUTO: 1.04 X10*3/UL (ref 1.2–4.8)
LYMPHOCYTES NFR BLD AUTO: 18.2 %
MCH RBC QN AUTO: 29.3 PG (ref 26–34)
MCHC RBC AUTO-ENTMCNC: 32.2 G/DL (ref 32–36)
MCV RBC AUTO: 91 FL (ref 80–100)
MONOCYTES # BLD AUTO: 0.7 X10*3/UL (ref 0.1–1)
MONOCYTES NFR BLD AUTO: 12.3 %
NEUTROPHILS # BLD AUTO: 3.63 X10*3/UL (ref 1.2–7.7)
NEUTROPHILS NFR BLD AUTO: 63.4 %
NRBC BLD-RTO: 0 /100 WBCS (ref 0–0)
PLATELET # BLD AUTO: 298 X10*3/UL (ref 150–450)
POTASSIUM SERPL-SCNC: 5 MMOL/L (ref 3.5–5.3)
PROT SERPL-MCNC: 6.9 G/DL (ref 6.4–8.2)
PROTHROMBIN TIME: 11.6 SECONDS (ref 9.8–12.8)
RBC # BLD AUTO: 4.2 X10*6/UL (ref 4–5.2)
RH FACTOR (ANTIGEN D): NORMAL
SODIUM SERPL-SCNC: 138 MMOL/L (ref 136–145)
WBC # BLD AUTO: 5.7 X10*3/UL (ref 4.4–11.3)

## 2024-05-26 PROCEDURE — 96374 THER/PROPH/DIAG INJ IV PUSH: CPT | Mod: 59 | Performed by: INTERNAL MEDICINE

## 2024-05-26 PROCEDURE — 85025 COMPLETE CBC W/AUTO DIFF WBC: CPT | Performed by: STUDENT IN AN ORGANIZED HEALTH CARE EDUCATION/TRAINING PROGRAM

## 2024-05-26 PROCEDURE — 86901 BLOOD TYPING SEROLOGIC RH(D): CPT | Performed by: STUDENT IN AN ORGANIZED HEALTH CARE EDUCATION/TRAINING PROGRAM

## 2024-05-26 PROCEDURE — 83605 ASSAY OF LACTIC ACID: CPT | Performed by: STUDENT IN AN ORGANIZED HEALTH CARE EDUCATION/TRAINING PROGRAM

## 2024-05-26 PROCEDURE — 99285 EMERGENCY DEPT VISIT HI MDM: CPT

## 2024-05-26 PROCEDURE — C9113 INJ PANTOPRAZOLE SODIUM, VIA: HCPCS | Performed by: NURSE PRACTITIONER

## 2024-05-26 PROCEDURE — 74177 CT ABD & PELVIS W/CONTRAST: CPT

## 2024-05-26 PROCEDURE — G0378 HOSPITAL OBSERVATION PER HR: HCPCS

## 2024-05-26 PROCEDURE — 93005 ELECTROCARDIOGRAM TRACING: CPT

## 2024-05-26 PROCEDURE — 80048 BASIC METABOLIC PNL TOTAL CA: CPT | Performed by: STUDENT IN AN ORGANIZED HEALTH CARE EDUCATION/TRAINING PROGRAM

## 2024-05-26 PROCEDURE — 2500000001 HC RX 250 WO HCPCS SELF ADMINISTERED DRUGS (ALT 637 FOR MEDICARE OP): Performed by: NURSE PRACTITIONER

## 2024-05-26 PROCEDURE — 82248 BILIRUBIN DIRECT: CPT | Performed by: STUDENT IN AN ORGANIZED HEALTH CARE EDUCATION/TRAINING PROGRAM

## 2024-05-26 PROCEDURE — 74177 CT ABD & PELVIS W/CONTRAST: CPT | Mod: FOREIGN READ | Performed by: RADIOLOGY

## 2024-05-26 PROCEDURE — 85610 PROTHROMBIN TIME: CPT | Performed by: STUDENT IN AN ORGANIZED HEALTH CARE EDUCATION/TRAINING PROGRAM

## 2024-05-26 PROCEDURE — 36415 COLL VENOUS BLD VENIPUNCTURE: CPT | Performed by: STUDENT IN AN ORGANIZED HEALTH CARE EDUCATION/TRAINING PROGRAM

## 2024-05-26 PROCEDURE — 2500000004 HC RX 250 GENERAL PHARMACY W/ HCPCS (ALT 636 FOR OP/ED): Performed by: NURSE PRACTITIONER

## 2024-05-26 PROCEDURE — 2550000001 HC RX 255 CONTRASTS: Performed by: STUDENT IN AN ORGANIZED HEALTH CARE EDUCATION/TRAINING PROGRAM

## 2024-05-26 RX ORDER — FLUTICASONE PROPIONATE 50 MCG
2 SPRAY, SUSPENSION (ML) NASAL DAILY
Status: DISCONTINUED | OUTPATIENT
Start: 2024-05-26 | End: 2024-05-28 | Stop reason: HOSPADM

## 2024-05-26 RX ORDER — DEXTROSE MONOHYDRATE AND SODIUM CHLORIDE 5; .9 G/100ML; G/100ML
75 INJECTION, SOLUTION INTRAVENOUS CONTINUOUS
Status: DISCONTINUED | OUTPATIENT
Start: 2024-05-26 | End: 2024-05-26

## 2024-05-26 RX ORDER — AMMONIUM LACTATE 12 G/100G
1 LOTION TOPICAL 2 TIMES DAILY
Status: DISCONTINUED | OUTPATIENT
Start: 2024-05-26 | End: 2024-05-28 | Stop reason: HOSPADM

## 2024-05-26 RX ORDER — DOCUSATE SODIUM 100 MG/1
100 CAPSULE, LIQUID FILLED ORAL 2 TIMES DAILY
COMMUNITY
Start: 2022-09-01

## 2024-05-26 RX ORDER — HYDROCODONE BITARTRATE AND ACETAMINOPHEN 5; 325 MG/1; MG/1
1 TABLET ORAL EVERY 12 HOURS PRN
COMMUNITY

## 2024-05-26 RX ORDER — DULOXETIN HYDROCHLORIDE 60 MG/1
60 CAPSULE, DELAYED RELEASE ORAL EVERY MORNING
COMMUNITY
Start: 2024-05-18

## 2024-05-26 RX ORDER — SIMETHICONE 80 MG
120 TABLET,CHEWABLE ORAL 4 TIMES DAILY PRN
Status: DISCONTINUED | OUTPATIENT
Start: 2024-05-26 | End: 2024-05-28

## 2024-05-26 RX ORDER — CLINDAMYCIN HYDROCHLORIDE 150 MG/1
150 CAPSULE ORAL 3 TIMES DAILY
Status: DISCONTINUED | OUTPATIENT
Start: 2024-05-26 | End: 2024-05-28 | Stop reason: HOSPADM

## 2024-05-26 RX ORDER — DIVALPROEX SODIUM 125 MG/1
125 TABLET, DELAYED RELEASE ORAL 2 TIMES DAILY
COMMUNITY
Start: 2024-03-11

## 2024-05-26 RX ORDER — DULOXETIN HYDROCHLORIDE 30 MG/1
60 CAPSULE, DELAYED RELEASE ORAL EVERY MORNING
Status: DISCONTINUED | OUTPATIENT
Start: 2024-05-27 | End: 2024-05-28 | Stop reason: HOSPADM

## 2024-05-26 RX ORDER — DIVALPROEX SODIUM 250 MG/1
1000 TABLET, DELAYED RELEASE ORAL EVERY 12 HOURS SCHEDULED
Status: DISCONTINUED | OUTPATIENT
Start: 2024-05-26 | End: 2024-05-28 | Stop reason: HOSPADM

## 2024-05-26 RX ORDER — HYDROCORTISONE ACETATE 25 MG/1
25 SUPPOSITORY RECTAL 2 TIMES DAILY PRN
Status: DISCONTINUED | OUTPATIENT
Start: 2024-05-26 | End: 2024-05-27 | Stop reason: SDUPTHER

## 2024-05-26 RX ORDER — UMECLIDINIUM 62.5 UG/1
1 AEROSOL, POWDER ORAL DAILY
COMMUNITY
Start: 2024-03-04

## 2024-05-26 RX ORDER — HYDROCORTISONE 25 MG/G
1 CREAM TOPICAL EVERY 6 HOURS PRN
COMMUNITY

## 2024-05-26 RX ORDER — LORATADINE 10 MG/1
10 TABLET ORAL DAILY
Status: DISCONTINUED | OUTPATIENT
Start: 2024-05-26 | End: 2024-05-28 | Stop reason: HOSPADM

## 2024-05-26 RX ORDER — L. ACIDOPHILUS/L.BULGARICUS 1MM CELL
1 TABLET ORAL DAILY
Status: DISCONTINUED | OUTPATIENT
Start: 2024-05-26 | End: 2024-05-28 | Stop reason: HOSPADM

## 2024-05-26 RX ORDER — DIVALPROEX SODIUM 125 MG/1
125 TABLET, DELAYED RELEASE ORAL 2 TIMES DAILY
Status: DISCONTINUED | OUTPATIENT
Start: 2024-05-26 | End: 2024-05-28 | Stop reason: HOSPADM

## 2024-05-26 RX ORDER — ACETAMINOPHEN 325 MG/1
650 TABLET ORAL EVERY 8 HOURS PRN
Status: DISCONTINUED | OUTPATIENT
Start: 2024-05-26 | End: 2024-05-28 | Stop reason: HOSPADM

## 2024-05-26 RX ORDER — PANTOPRAZOLE SODIUM 40 MG/10ML
40 INJECTION, POWDER, LYOPHILIZED, FOR SOLUTION INTRAVENOUS 2 TIMES DAILY
Status: DISCONTINUED | OUTPATIENT
Start: 2024-05-26 | End: 2024-05-28 | Stop reason: HOSPADM

## 2024-05-26 RX ORDER — CLINDAMYCIN HYDROCHLORIDE 150 MG/1
150 CAPSULE ORAL 3 TIMES DAILY
COMMUNITY
Start: 2024-05-20 | End: 2024-05-30

## 2024-05-26 RX ORDER — FLUTICASONE PROPIONATE 50 MCG
2 SPRAY, SUSPENSION (ML) NASAL DAILY
COMMUNITY
Start: 2020-11-23

## 2024-05-26 RX ORDER — ONDANSETRON HYDROCHLORIDE 2 MG/ML
4 INJECTION, SOLUTION INTRAVENOUS EVERY 6 HOURS PRN
Status: DISCONTINUED | OUTPATIENT
Start: 2024-05-26 | End: 2024-05-28 | Stop reason: HOSPADM

## 2024-05-26 RX ORDER — LORATADINE 10 MG/1
10 TABLET ORAL 2 TIMES DAILY
COMMUNITY

## 2024-05-26 RX ORDER — BUSPIRONE HYDROCHLORIDE 10 MG/1
10 TABLET ORAL 3 TIMES DAILY
Status: DISCONTINUED | OUTPATIENT
Start: 2024-05-26 | End: 2024-05-28 | Stop reason: HOSPADM

## 2024-05-26 RX ORDER — FUROSEMIDE 20 MG/1
20 TABLET ORAL
Status: DISCONTINUED | OUTPATIENT
Start: 2024-05-27 | End: 2024-05-28 | Stop reason: HOSPADM

## 2024-05-26 RX ORDER — ZIPRASIDONE HYDROCHLORIDE 60 MG/1
60 CAPSULE ORAL
COMMUNITY
Start: 2024-03-11

## 2024-05-26 RX ORDER — DIVALPROEX SODIUM 500 MG/1
1000 TABLET, DELAYED RELEASE ORAL 2 TIMES DAILY
COMMUNITY

## 2024-05-26 RX ORDER — FUROSEMIDE 20 MG/1
20 TABLET ORAL
COMMUNITY

## 2024-05-26 RX ORDER — HYDROCODONE BITARTRATE AND ACETAMINOPHEN 5; 325 MG/1; MG/1
1 TABLET ORAL EVERY 6 HOURS PRN
Status: DISCONTINUED | OUTPATIENT
Start: 2024-05-26 | End: 2024-05-28 | Stop reason: HOSPADM

## 2024-05-26 RX ORDER — AMMONIUM LACTATE 12 G/100G
1 LOTION TOPICAL 2 TIMES DAILY
COMMUNITY
Start: 2024-03-01

## 2024-05-26 RX ORDER — BUSPIRONE HYDROCHLORIDE 10 MG/1
10 TABLET ORAL 3 TIMES DAILY
COMMUNITY
Start: 2024-05-17

## 2024-05-26 RX ADMIN — DIVALPROEX SODIUM 1000 MG: 250 TABLET, DELAYED RELEASE ORAL at 22:41

## 2024-05-26 RX ADMIN — IOHEXOL 75 ML: 350 INJECTION, SOLUTION INTRAVENOUS at 17:29

## 2024-05-26 RX ADMIN — HYDROCODONE BITARTRATE AND ACETAMINOPHEN 1 TABLET: 5; 325 TABLET ORAL at 21:04

## 2024-05-26 RX ADMIN — CLINDAMYCIN HYDROCHLORIDE 150 MG: 150 CAPSULE ORAL at 22:40

## 2024-05-26 RX ADMIN — DEXTROSE AND SODIUM CHLORIDE 75 ML/HR: 5; 900 INJECTION, SOLUTION INTRAVENOUS at 19:45

## 2024-05-26 RX ADMIN — LORATADINE 10 MG: 10 TABLET ORAL at 22:41

## 2024-05-26 RX ADMIN — Medication 1 TABLET: at 22:40

## 2024-05-26 RX ADMIN — PANTOPRAZOLE SODIUM 40 MG: 40 INJECTION, POWDER, FOR SOLUTION INTRAVENOUS at 19:45

## 2024-05-26 RX ADMIN — BUSPIRONE HYDROCHLORIDE 10 MG: 10 TABLET ORAL at 22:41

## 2024-05-26 RX ADMIN — DIVALPROEX SODIUM 125 MG: 125 TABLET, DELAYED RELEASE ORAL at 22:42

## 2024-05-26 SDOH — SOCIAL STABILITY: SOCIAL INSECURITY: WERE YOU ABLE TO COMPLETE ALL THE BEHAVIORAL HEALTH SCREENINGS?: YES

## 2024-05-26 SDOH — SOCIAL STABILITY: SOCIAL NETWORK: ARE YOU MARRIED, WIDOWED, DIVORCED, SEPARATED, NEVER MARRIED, OR LIVING WITH A PARTNER?: NEVER MARRIED

## 2024-05-26 SDOH — ECONOMIC STABILITY: FOOD INSECURITY: WITHIN THE PAST 12 MONTHS, THE FOOD YOU BOUGHT JUST DIDN'T LAST AND YOU DIDN'T HAVE MONEY TO GET MORE.: NEVER TRUE

## 2024-05-26 SDOH — SOCIAL STABILITY: SOCIAL INSECURITY
WITHIN THE LAST YEAR, HAVE TO BEEN RAPED OR FORCED TO HAVE ANY KIND OF SEXUAL ACTIVITY BY YOUR PARTNER OR EX-PARTNER?: NO

## 2024-05-26 SDOH — HEALTH STABILITY: MENTAL HEALTH
STRESS IS WHEN SOMEONE FEELS TENSE, NERVOUS, ANXIOUS, OR CAN'T SLEEP AT NIGHT BECAUSE THEIR MIND IS TROUBLED. HOW STRESSED ARE YOU?: NOT AT ALL

## 2024-05-26 SDOH — ECONOMIC STABILITY: FOOD INSECURITY: WITHIN THE PAST 12 MONTHS, YOU WORRIED THAT YOUR FOOD WOULD RUN OUT BEFORE YOU GOT MONEY TO BUY MORE.: NEVER TRUE

## 2024-05-26 SDOH — HEALTH STABILITY: PHYSICAL HEALTH: ON AVERAGE, HOW MANY MINUTES DO YOU ENGAGE IN EXERCISE AT THIS LEVEL?: 0 MIN

## 2024-05-26 SDOH — SOCIAL STABILITY: SOCIAL INSECURITY: DO YOU FEEL ANYONE HAS EXPLOITED OR TAKEN ADVANTAGE OF YOU FINANCIALLY OR OF YOUR PERSONAL PROPERTY?: NO

## 2024-05-26 SDOH — SOCIAL STABILITY: SOCIAL NETWORK
DO YOU BELONG TO ANY CLUBS OR ORGANIZATIONS SUCH AS CHURCH GROUPS UNIONS, FRATERNAL OR ATHLETIC GROUPS, OR SCHOOL GROUPS?: NO

## 2024-05-26 SDOH — SOCIAL STABILITY: SOCIAL INSECURITY: HAS ANYONE EVER THREATENED TO HURT YOUR FAMILY OR YOUR PETS?: NO

## 2024-05-26 SDOH — HEALTH STABILITY: MENTAL HEALTH: EXPERIENCED ANY OF THE FOLLOWING LIFE EVENTS: OTHER (COMMENT)

## 2024-05-26 SDOH — SOCIAL STABILITY: SOCIAL INSECURITY: WITHIN THE LAST YEAR, HAVE YOU BEEN AFRAID OF YOUR PARTNER OR EX-PARTNER?: NO

## 2024-05-26 SDOH — SOCIAL STABILITY: SOCIAL INSECURITY: WITHIN THE LAST YEAR, HAVE YOU BEEN HUMILIATED OR EMOTIONALLY ABUSED IN OTHER WAYS BY YOUR PARTNER OR EX-PARTNER?: NO

## 2024-05-26 SDOH — SOCIAL STABILITY: SOCIAL INSECURITY: ABUSE: ADULT

## 2024-05-26 SDOH — SOCIAL STABILITY: SOCIAL INSECURITY: POSSIBLE ABUSE REPORTED TO:: OTHER (COMMENT)

## 2024-05-26 SDOH — HEALTH STABILITY: PHYSICAL HEALTH: ON AVERAGE, HOW MANY DAYS PER WEEK DO YOU ENGAGE IN MODERATE TO STRENUOUS EXERCISE (LIKE A BRISK WALK)?: 0 DAYS

## 2024-05-26 SDOH — ECONOMIC STABILITY: INCOME INSECURITY: IN THE PAST 12 MONTHS, HAS THE ELECTRIC, GAS, OIL, OR WATER COMPANY THREATENED TO SHUT OFF SERVICE IN YOUR HOME?: NO

## 2024-05-26 SDOH — SOCIAL STABILITY: SOCIAL NETWORK: HOW OFTEN DO YOU GET TOGETHER WITH FRIENDS OR RELATIVES?: TWICE A WEEK

## 2024-05-26 SDOH — SOCIAL STABILITY: SOCIAL INSECURITY: HAVE YOU HAD ANY THOUGHTS OF HARMING ANYONE ELSE?: NO

## 2024-05-26 SDOH — SOCIAL STABILITY: SOCIAL INSECURITY
WITHIN THE LAST YEAR, HAVE YOU BEEN KICKED, HIT, SLAPPED, OR OTHERWISE PHYSICALLY HURT BY YOUR PARTNER OR EX-PARTNER?: NO

## 2024-05-26 SDOH — SOCIAL STABILITY: SOCIAL NETWORK: HOW OFTEN DO YOU ATTENT MEETINGS OF THE CLUB OR ORGANIZATION YOU BELONG TO?: NEVER

## 2024-05-26 SDOH — SOCIAL STABILITY: SOCIAL INSECURITY: DO YOU FEEL UNSAFE GOING BACK TO THE PLACE WHERE YOU ARE LIVING?: NO

## 2024-05-26 SDOH — SOCIAL STABILITY: SOCIAL NETWORK: HOW OFTEN DO YOU ATTEND CHURCH OR RELIGIOUS SERVICES?: NEVER

## 2024-05-26 SDOH — HEALTH STABILITY: MENTAL HEALTH
HOW OFTEN DO YOU NEED TO HAVE SOMEONE HELP YOU WHEN YOU READ INSTRUCTIONS, PAMPHLETS, OR OTHER WRITTEN MATERIAL FROM YOUR DOCTOR OR PHARMACY?: NEVER

## 2024-05-26 SDOH — SOCIAL STABILITY: SOCIAL INSECURITY: ARE YOU OR HAVE YOU BEEN THREATENED OR ABUSED PHYSICALLY, EMOTIONALLY, OR SEXUALLY BY ANYONE?: NO

## 2024-05-26 SDOH — SOCIAL STABILITY: SOCIAL INSECURITY: ARE THERE ANY APPARENT SIGNS OF INJURIES/BEHAVIORS THAT COULD BE RELATED TO ABUSE/NEGLECT?: NO

## 2024-05-26 SDOH — SOCIAL STABILITY: SOCIAL INSECURITY: HAVE YOU HAD THOUGHTS OF HARMING ANYONE ELSE?: NO

## 2024-05-26 SDOH — SOCIAL STABILITY: SOCIAL INSECURITY: DOES ANYONE TRY TO KEEP YOU FROM HAVING/CONTACTING OTHER FRIENDS OR DOING THINGS OUTSIDE YOUR HOME?: NO

## 2024-05-26 SDOH — SOCIAL STABILITY: SOCIAL NETWORK
IN A TYPICAL WEEK, HOW MANY TIMES DO YOU TALK ON THE PHONE WITH FAMILY, FRIENDS, OR NEIGHBORS?: MORE THAN THREE TIMES A WEEK

## 2024-05-26 ASSESSMENT — COGNITIVE AND FUNCTIONAL STATUS - GENERAL
MOVING FROM LYING ON BACK TO SITTING ON SIDE OF FLAT BED WITH BEDRAILS: A LITTLE
PATIENT BASELINE BEDBOUND: NO
MOVING TO AND FROM BED TO CHAIR: A LITTLE
WALKING IN HOSPITAL ROOM: A LITTLE
TURNING FROM BACK TO SIDE WHILE IN FLAT BAD: A LITTLE
MOBILITY SCORE: 18
DRESSING REGULAR UPPER BODY CLOTHING: A LITTLE
DAILY ACTIVITIY SCORE: 19
PERSONAL GROOMING: A LITTLE
DRESSING REGULAR LOWER BODY CLOTHING: A LITTLE
CLIMB 3 TO 5 STEPS WITH RAILING: A LITTLE
HELP NEEDED FOR BATHING: A LITTLE
STANDING UP FROM CHAIR USING ARMS: A LITTLE
TOILETING: A LITTLE

## 2024-05-26 ASSESSMENT — LIFESTYLE VARIABLES
AUDIT-C TOTAL SCORE: 0
HOW OFTEN DO YOU HAVE A DRINK CONTAINING ALCOHOL: NEVER
AUDIT-C TOTAL SCORE: 0
TOTAL SCORE: 0
SKIP TO QUESTIONS 9-10: 1
PRESCIPTION_ABUSE_PAST_12_MONTHS: NO
EVER FELT BAD OR GUILTY ABOUT YOUR DRINKING: NO
HOW MANY STANDARD DRINKS CONTAINING ALCOHOL DO YOU HAVE ON A TYPICAL DAY: PATIENT DOES NOT DRINK
HAVE PEOPLE ANNOYED YOU BY CRITICIZING YOUR DRINKING: NO
SUBSTANCE_ABUSE_PAST_12_MONTHS: NO
EVER HAD A DRINK FIRST THING IN THE MORNING TO STEADY YOUR NERVES TO GET RID OF A HANGOVER: NO
HOW OFTEN DO YOU HAVE 6 OR MORE DRINKS ON ONE OCCASION: NEVER
HAVE YOU EVER FELT YOU SHOULD CUT DOWN ON YOUR DRINKING: NO

## 2024-05-26 ASSESSMENT — PAIN DESCRIPTION - LOCATION: LOCATION: OTHER (COMMENT)

## 2024-05-26 ASSESSMENT — PATIENT HEALTH QUESTIONNAIRE - PHQ9
SUM OF ALL RESPONSES TO PHQ9 QUESTIONS 1 & 2: 0
1. LITTLE INTEREST OR PLEASURE IN DOING THINGS: NOT AT ALL
2. FEELING DOWN, DEPRESSED OR HOPELESS: NOT AT ALL

## 2024-05-26 ASSESSMENT — ACTIVITIES OF DAILY LIVING (ADL)
DRESSING YOURSELF: NEEDS ASSISTANCE
HEARING - RIGHT EAR: FUNCTIONAL
TOILETING: NEEDS ASSISTANCE
HEARING - LEFT EAR: FUNCTIONAL
WALKS IN HOME: NEEDS ASSISTANCE
LACK_OF_TRANSPORTATION: NO
ADEQUATE_TO_COMPLETE_ADL: YES
ASSISTIVE_DEVICE: EYEGLASSES;WALKER
FEEDING YOURSELF: INDEPENDENT
BATHING: NEEDS ASSISTANCE
GROOMING: NEEDS ASSISTANCE
PATIENT'S MEMORY ADEQUATE TO SAFELY COMPLETE DAILY ACTIVITIES?: YES
JUDGMENT_ADEQUATE_SAFELY_COMPLETE_DAILY_ACTIVITIES: YES

## 2024-05-26 ASSESSMENT — COLUMBIA-SUICIDE SEVERITY RATING SCALE - C-SSRS
6. HAVE YOU EVER DONE ANYTHING, STARTED TO DO ANYTHING, OR PREPARED TO DO ANYTHING TO END YOUR LIFE?: NO
1. IN THE PAST MONTH, HAVE YOU WISHED YOU WERE DEAD OR WISHED YOU COULD GO TO SLEEP AND NOT WAKE UP?: NO
2. HAVE YOU ACTUALLY HAD ANY THOUGHTS OF KILLING YOURSELF?: NO

## 2024-05-26 ASSESSMENT — PAIN - FUNCTIONAL ASSESSMENT
PAIN_FUNCTIONAL_ASSESSMENT: 0-10

## 2024-05-26 ASSESSMENT — PAIN SCALES - GENERAL
PAINLEVEL_OUTOF10: 9
PAINLEVEL_OUTOF10: 7
PAINLEVEL_OUTOF10: 9

## 2024-05-26 NOTE — PROGRESS NOTES
Pharmacy Medication History Review    Gray Kim is a 63 y.o. female admitted for No Principal Problem: There is no principal problem currently on the Problem List. Please update the Problem List and refresh.. Pharmacy reviewed the patient's [id number] medications and allergies for accuracy.    The list below reflectives the updated PTA list. Please review each medication in order reconciliation for additional clarification and justification.  Prior to Admission medications    Medication Sig Start Date End Date Taking? Authorizing Provider   ammonium lactate (Lac-Hydrin) 12 % lotion Apply 1 Application topically 2 times a day. Bilateral Lower Extremities 3/1/24  Yes Historical Provider, MD   busPIRone (Buspar) 10 mg tablet Take 1 tablet (10 mg) by mouth 3 times a day. 5/17/24  Yes Historical Provider, MD   divalproex (Depakote) 125 mg EC tablet Take 1 tablet (125 mg) by mouth 2 times a day. Total dose 1125 mg 3/11/24  Yes Historical Provider, MD   docusate sodium (Colace) 100 mg capsule Take 1 capsule (100 mg) by mouth twice a day. 9/1/22  Yes Historical Provider, MD   DULoxetine (Cymbalta) 60 mg DR capsule Take 1 capsule (60 mg) by mouth once daily in the morning. 5/18/24  Yes Historical Provider, MD   fluticasone (Flonase) 50 mcg/actuation nasal spray Administer 2 sprays into each nostril once daily. 11/23/20  Yes Historical Provider, MD   Incruse Ellipta 62.5 mcg/actuation inhalation Inhale 1 puff (62.5 mcg) once daily. 3/4/24  Yes Historical Provider, MD   rivaroxaban (Xarelto) 20 mg tablet Take 1 tablet (20 mg) by mouth once daily in the evening. Take with meals. Hold from 05/17/2024 to 05/20/2024 11/12/22  Yes Historical Provider, MD   ziprasidone (Geodon) 60 mg capsule Take 1 capsule (60 mg) by mouth 2 times daily (morning and late afternoon). 3/11/24  Yes Historical Provider, MD   clindamycin (Cleocin) 150 mg capsule Take 1 capsule (150 mg) by mouth 3 times a day. 5/20/24 5/30/24  Historical  Provider, MD   divalproex (Depakote) 500 mg EC tablet Take 2 tablets (1,000 mg) by mouth twice a day. Total dose 1125 mg    Historical Provider, MD   HYDROcodone-acetaminophen (Norco) 5-325 mg tablet Take 1 tablet by mouth every 12 hours if needed for severe pain (7 - 10).    Historical Provider, MD   hydrocortisone (Anusol-HC) 2.5 % rectal cream Insert 1 Application into the rectum every 6 hours if needed for hemorrhoids.    Historical Provider, MD   Lactobacillus acidophilus capsule Take 1 capsule by mouth once daily. 5/17/24   Historical Provider, MD   loratadine (Claritin) 10 mg tablet Take 1 tablet (10 mg) by mouth 2 times a day.    Historical Provider, MD   phenylephrine-cocoa butter (Hemorrhoidal) 0.25-88.44 % suppository Insert 1 suppository into the rectum once daily at bedtime.    Historical Provider, MD        The list below reflectives the updated allergy list. Please review each documented allergy for additional clarification and justification.  Allergies  Reviewed by Olu Cazares RN on 5/26/2024        Severity Reactions Comments    Nsaids (non-steroidal Anti-inflammatory Drug) Not Specified GI Upset     Aspirin Low GI Upset     Penicillins Low Hives, Rash     Simvastatin Low Hives, GI Upset             Below are additional concerns with the patient's PTA list.  Medication list from [facility], printed 05/26/2024 7696.    Katya Laura

## 2024-05-26 NOTE — ED PROVIDER NOTES
"HPI   Chief Complaint   Patient presents with    Hemorrhoids       Brought in by EMS for reported \"hemorrhoid pain.\"  Patient states that she has been dealing with this for \"a while,\" but states that it worsened over the past 2 days.  States that she is now experiencing diffuse abdominal pain.  Also notes that she is seeing bright red blood in her stool.  Also states she is now having diarrhea.  Thinks it is secondary to hemorrhoids.  States that she is already taking medications for this.  Also states that she is taking a blood thinner in the form of rivaroxaban.  Denies prior GI bleed, does note a history of an ulcer that was \"removed.\"  Denies a history of diverticulosis.  Denies a history of cirrhosis.      History provided by:  Patient and EMS personnel   used: No                        No data recorded                   Patient History   Past Medical History:   Diagnosis Date    Bipolar disorder, currently in remission, most recent episode unspecified ([facility]) 02/11/2021    History of depressed bipolar disorder     No past surgical history on file.  No family history on file.  Social History     Tobacco Use    Smoking status: Former     Types: Cigarettes    Smokeless tobacco: Never   Substance Use Topics    Alcohol use: Not Currently    Drug use: Not Currently       Physical Exam   ED Triage Vitals   Temp Pulse Resp BP   -- -- -- --      SpO2 Temp src Heart Rate Source Patient Position   -- -- -- --      BP Location FiO2 (%)     -- --       Physical Exam  Vitals and nursing note reviewed.   HENT:      Head: Atraumatic.      Mouth/Throat:      Mouth: Mucous membranes are moist.   Eyes:      Conjunctiva/sclera: Conjunctivae normal.   Cardiovascular:      Rate and Rhythm: Normal rate and regular rhythm.   Pulmonary:      Effort: Pulmonary effort is normal.   Abdominal:      Palpations: Abdomen is soft.      Tenderness: There is abdominal tenderness (Diffuse abdominal pain without guarding or " rebound.  Tenderness is not reliably reproducible when the patient is distracted.).   Genitourinary:     Comments: Rectal exam performed with nurse chaperone at bedside.  There is a small amount of gross blood noted around the rectum.  There appears to be a skin tag that is not bleeding at approximately 4:00.  No obvious external hemorrhoids.  No significant internal hemorrhoids palpated.  Digital rectal exam without obvious gross blood, hematochezia, or melena.  Musculoskeletal:         General: No deformity.      Cervical back: Normal range of motion.   Skin:     General: Skin is warm and dry.   Neurological:      Mental Status: She is alert.      Comments: Moving all extremities         ED Course & MDM   ED Course as of 05/26/24 1910   Sun May 26, 2024   1507 5/27/2021 EGD:  Impression:            - Normal esophagus.                         - Random biopsies were taken with a cold forceps for                          histology in the mid esophagus and in the distal                          esophagus.                         - Moderately erythematous mucosa in the antrum and                          prepyloric region of the stomach. Biopsied.                         - Normal examined duodenum to D2. [AB]   1508 5/27/2021 colonoscopy:  Impression:            - Preparation of the colon was fair.                         - Moderate amount of semi-solid stool in the entire                          examined colon.                         - The examined portion of the terminal ileum was                          normal.                         - Multiple erosions at the splenic flexure.                         - Small non-bleeding external and internal hemorrhoids.                         - Random biopsies were taken with a cold forceps for                          histology in the rectum, in the left colon, at the                          splenic flexure, in the transverse colon, in the right                           colon and in the terminal ileum. [AB]      ED Course User Index  [AB] Dusty Petty MD         Diagnoses as of 05/26/24 1910   Gastrointestinal hemorrhage, unspecified gastrointestinal hemorrhage type   Anticoagulated       Medical Decision Making  Clinical picture concerning for GI bleed.  Suspect upper source.  BUN/creatinine ratio greater than 30.  Concern for gastric ulcer on CT imaging.  Hemoglobin stable.  No history of cirrhosis; no indication for prophylactic antibiotics.  Hemodynamically stable.  She is on anticoagulation in the form of rivaroxaban.  Will escalate care to hospitalization for further management.    Amount and/or Complexity of Data Reviewed  Independent Historian: EMS  External Data Reviewed: notes.     Details: Nursing of documentation  Labs: ordered.  Radiology: ordered.  ECG/medicine tests: ordered and independent interpretation performed.     Details: ECG interpretation: Normal sinus rhythm heart rate 107, no ST segment elevation, QTc 467    Risk  Decision regarding hospitalization.        Procedure  Procedures     Dusty Petty MD  05/26/24 1912

## 2024-05-27 ENCOUNTER — APPOINTMENT (OUTPATIENT)
Dept: RADIOLOGY | Facility: HOSPITAL | Age: 64
End: 2024-05-27
Payer: COMMERCIAL

## 2024-05-27 LAB
ANION GAP SERPL CALC-SCNC: 11 MMOL/L (ref 10–20)
APPEARANCE UR: CLEAR
BILIRUB UR STRIP.AUTO-MCNC: NEGATIVE MG/DL
BUN SERPL-MCNC: 20 MG/DL (ref 6–23)
CALCIUM SERPL-MCNC: 9.2 MG/DL (ref 8.6–10.3)
CHLORIDE SERPL-SCNC: 101 MMOL/L (ref 98–107)
CO2 SERPL-SCNC: 30 MMOL/L (ref 21–32)
COLOR UR: YELLOW
CREAT SERPL-MCNC: 0.7 MG/DL (ref 0.5–1.05)
EGFRCR SERPLBLD CKD-EPI 2021: >90 ML/MIN/1.73M*2
ERYTHROCYTE [DISTWIDTH] IN BLOOD BY AUTOMATED COUNT: 13.5 % (ref 11.5–14.5)
GLUCOSE SERPL-MCNC: 84 MG/DL (ref 74–99)
GLUCOSE UR STRIP.AUTO-MCNC: NORMAL MG/DL
HCT VFR BLD AUTO: 36.1 % (ref 36–46)
HCT VFR BLD AUTO: 37.6 % (ref 36–46)
HGB BLD-MCNC: 11.7 G/DL (ref 12–16)
HGB BLD-MCNC: 11.8 G/DL (ref 12–16)
HOLD SPECIMEN: NORMAL
KETONES UR STRIP.AUTO-MCNC: NEGATIVE MG/DL
LEUKOCYTE ESTERASE UR QL STRIP.AUTO: NEGATIVE
MCH RBC QN AUTO: 29.3 PG (ref 26–34)
MCHC RBC AUTO-ENTMCNC: 31.1 G/DL (ref 32–36)
MCV RBC AUTO: 94 FL (ref 80–100)
NITRITE UR QL STRIP.AUTO: NEGATIVE
NRBC BLD-RTO: 0 /100 WBCS (ref 0–0)
PH UR STRIP.AUTO: 5.5 [PH]
PLATELET # BLD AUTO: 274 X10*3/UL (ref 150–450)
POTASSIUM SERPL-SCNC: 4.8 MMOL/L (ref 3.5–5.3)
PROT UR STRIP.AUTO-MCNC: NEGATIVE MG/DL
RBC # BLD AUTO: 3.99 X10*6/UL (ref 4–5.2)
RBC # UR STRIP.AUTO: NEGATIVE /UL
SODIUM SERPL-SCNC: 137 MMOL/L (ref 136–145)
SP GR UR STRIP.AUTO: 1.04
UROBILINOGEN UR STRIP.AUTO-MCNC: NORMAL MG/DL
WBC # BLD AUTO: 5.3 X10*3/UL (ref 4.4–11.3)

## 2024-05-27 PROCEDURE — C9113 INJ PANTOPRAZOLE SODIUM, VIA: HCPCS | Performed by: NURSE PRACTITIONER

## 2024-05-27 PROCEDURE — 2500000001 HC RX 250 WO HCPCS SELF ADMINISTERED DRUGS (ALT 637 FOR MEDICARE OP): Performed by: NURSE PRACTITIONER

## 2024-05-27 PROCEDURE — 2500000004 HC RX 250 GENERAL PHARMACY W/ HCPCS (ALT 636 FOR OP/ED): Performed by: NURSE PRACTITIONER

## 2024-05-27 PROCEDURE — 99223 1ST HOSP IP/OBS HIGH 75: CPT | Performed by: STUDENT IN AN ORGANIZED HEALTH CARE EDUCATION/TRAINING PROGRAM

## 2024-05-27 PROCEDURE — 81003 URINALYSIS AUTO W/O SCOPE: CPT | Performed by: NURSE PRACTITIONER

## 2024-05-27 PROCEDURE — 85027 COMPLETE CBC AUTOMATED: CPT | Performed by: NURSE PRACTITIONER

## 2024-05-27 PROCEDURE — 96375 TX/PRO/DX INJ NEW DRUG ADDON: CPT | Performed by: INTERNAL MEDICINE

## 2024-05-27 PROCEDURE — 2500000002 HC RX 250 W HCPCS SELF ADMINISTERED DRUGS (ALT 637 FOR MEDICARE OP, ALT 636 FOR OP/ED): Performed by: NURSE PRACTITIONER

## 2024-05-27 PROCEDURE — 36415 COLL VENOUS BLD VENIPUNCTURE: CPT | Performed by: NURSE PRACTITIONER

## 2024-05-27 PROCEDURE — 2500000001 HC RX 250 WO HCPCS SELF ADMINISTERED DRUGS (ALT 637 FOR MEDICARE OP): Performed by: INTERNAL MEDICINE

## 2024-05-27 PROCEDURE — 94640 AIRWAY INHALATION TREATMENT: CPT

## 2024-05-27 PROCEDURE — 96376 TX/PRO/DX INJ SAME DRUG ADON: CPT | Performed by: INTERNAL MEDICINE

## 2024-05-27 PROCEDURE — 2500000001 HC RX 250 WO HCPCS SELF ADMINISTERED DRUGS (ALT 637 FOR MEDICARE OP)

## 2024-05-27 PROCEDURE — 73502 X-RAY EXAM HIP UNI 2-3 VIEWS: CPT | Mod: RT

## 2024-05-27 PROCEDURE — 80048 BASIC METABOLIC PNL TOTAL CA: CPT | Performed by: NURSE PRACTITIONER

## 2024-05-27 PROCEDURE — 85014 HEMATOCRIT: CPT | Mod: 59 | Performed by: NURSE PRACTITIONER

## 2024-05-27 PROCEDURE — G0378 HOSPITAL OBSERVATION PER HR: HCPCS

## 2024-05-27 RX ORDER — TRAZODONE HYDROCHLORIDE 100 MG/1
200 TABLET ORAL NIGHTLY
COMMUNITY

## 2024-05-27 RX ORDER — HYDROCORTISONE ACETATE 25 MG/1
25 SUPPOSITORY RECTAL 2 TIMES DAILY
Status: DISCONTINUED | OUTPATIENT
Start: 2024-05-27 | End: 2024-05-28 | Stop reason: HOSPADM

## 2024-05-27 RX ORDER — TALC
3 POWDER (GRAM) TOPICAL NIGHTLY
Status: DISCONTINUED | OUTPATIENT
Start: 2024-05-27 | End: 2024-05-28 | Stop reason: HOSPADM

## 2024-05-27 RX ORDER — MELATONIN 3 MG
CAPSULE ORAL
COMMUNITY

## 2024-05-27 RX ORDER — TRAZODONE HYDROCHLORIDE 50 MG/1
200 TABLET ORAL NIGHTLY
Status: DISCONTINUED | OUTPATIENT
Start: 2024-05-27 | End: 2024-05-28 | Stop reason: HOSPADM

## 2024-05-27 RX ORDER — TALC
3 POWDER (GRAM) TOPICAL DAILY
COMMUNITY

## 2024-05-27 RX ADMIN — TRAZODONE HYDROCHLORIDE 200 MG: 50 TABLET ORAL at 21:19

## 2024-05-27 RX ADMIN — PANTOPRAZOLE SODIUM 40 MG: 40 INJECTION, POWDER, FOR SOLUTION INTRAVENOUS at 08:56

## 2024-05-27 RX ADMIN — DIVALPROEX SODIUM 1000 MG: 250 TABLET, DELAYED RELEASE ORAL at 08:54

## 2024-05-27 RX ADMIN — CLINDAMYCIN HYDROCHLORIDE 150 MG: 150 CAPSULE ORAL at 22:37

## 2024-05-27 RX ADMIN — TIOTROPIUM BROMIDE INHALATION SPRAY 2 PUFF: 3.12 SPRAY, METERED RESPIRATORY (INHALATION) at 06:47

## 2024-05-27 RX ADMIN — PANTOPRAZOLE SODIUM 40 MG: 40 INJECTION, POWDER, FOR SOLUTION INTRAVENOUS at 21:20

## 2024-05-27 RX ADMIN — HYDROCORTISONE ACETATE 25 MG: 25 SUPPOSITORY RECTAL at 21:21

## 2024-05-27 RX ADMIN — LORATADINE 10 MG: 10 TABLET ORAL at 08:56

## 2024-05-27 RX ADMIN — ONDANSETRON 4 MG: 2 INJECTION INTRAMUSCULAR; INTRAVENOUS at 15:11

## 2024-05-27 RX ADMIN — Medication 3 MG: at 21:19

## 2024-05-27 RX ADMIN — DIVALPROEX SODIUM 125 MG: 125 TABLET, DELAYED RELEASE ORAL at 08:59

## 2024-05-27 RX ADMIN — TRAZODONE HYDROCHLORIDE 200 MG: 50 TABLET ORAL at 00:36

## 2024-05-27 RX ADMIN — SIMETHICONE 120 MG: 80 TABLET, CHEWABLE ORAL at 03:17

## 2024-05-27 RX ADMIN — ZIPRASIDONE HCL 60 MG: 40 CAPSULE ORAL at 08:54

## 2024-05-27 RX ADMIN — CLINDAMYCIN HYDROCHLORIDE 150 MG: 150 CAPSULE ORAL at 14:00

## 2024-05-27 RX ADMIN — Medication 1 APPLICATION: at 21:26

## 2024-05-27 RX ADMIN — HYDROCODONE BITARTRATE AND ACETAMINOPHEN 1 TABLET: 5; 325 TABLET ORAL at 03:14

## 2024-05-27 RX ADMIN — BUSPIRONE HYDROCHLORIDE 10 MG: 10 TABLET ORAL at 08:55

## 2024-05-27 RX ADMIN — BUSPIRONE HYDROCHLORIDE 10 MG: 10 TABLET ORAL at 14:00

## 2024-05-27 RX ADMIN — DIVALPROEX SODIUM 1000 MG: 250 TABLET, DELAYED RELEASE ORAL at 21:18

## 2024-05-27 RX ADMIN — HYDROCODONE BITARTRATE AND ACETAMINOPHEN 1 TABLET: 5; 325 TABLET ORAL at 21:20

## 2024-05-27 RX ADMIN — SIMETHICONE 120 MG: 80 TABLET, CHEWABLE ORAL at 21:19

## 2024-05-27 RX ADMIN — ONDANSETRON 4 MG: 2 INJECTION INTRAMUSCULAR; INTRAVENOUS at 09:05

## 2024-05-27 RX ADMIN — BUSPIRONE HYDROCHLORIDE 10 MG: 10 TABLET ORAL at 21:20

## 2024-05-27 RX ADMIN — HYDROCODONE BITARTRATE AND ACETAMINOPHEN 1 TABLET: 5; 325 TABLET ORAL at 09:00

## 2024-05-27 RX ADMIN — SIMETHICONE 120 MG: 80 TABLET, CHEWABLE ORAL at 09:05

## 2024-05-27 RX ADMIN — Medication 3 MG: at 00:36

## 2024-05-27 RX ADMIN — ZIPRASIDONE HCL 60 MG: 40 CAPSULE ORAL at 16:26

## 2024-05-27 RX ADMIN — SIMETHICONE 120 MG: 80 TABLET, CHEWABLE ORAL at 15:11

## 2024-05-27 RX ADMIN — ONDANSETRON 4 MG: 2 INJECTION INTRAMUSCULAR; INTRAVENOUS at 03:17

## 2024-05-27 RX ADMIN — DIVALPROEX SODIUM 125 MG: 125 TABLET, DELAYED RELEASE ORAL at 21:25

## 2024-05-27 RX ADMIN — Medication 1 TABLET: at 08:55

## 2024-05-27 RX ADMIN — ONDANSETRON 4 MG: 2 INJECTION INTRAMUSCULAR; INTRAVENOUS at 21:20

## 2024-05-27 RX ADMIN — FLUTICASONE PROPIONATE 2 SPRAY: 50 SPRAY, METERED NASAL at 09:00

## 2024-05-27 RX ADMIN — CLINDAMYCIN HYDROCHLORIDE 150 MG: 150 CAPSULE ORAL at 08:55

## 2024-05-27 RX ADMIN — Medication 1 APPLICATION: at 08:58

## 2024-05-27 RX ADMIN — HYDROCODONE BITARTRATE AND ACETAMINOPHEN 1 TABLET: 5; 325 TABLET ORAL at 15:10

## 2024-05-27 RX ADMIN — DULOXETINE HYDROCHLORIDE 60 MG: 30 CAPSULE, DELAYED RELEASE ORAL at 08:55

## 2024-05-27 ASSESSMENT — PAIN - FUNCTIONAL ASSESSMENT
PAIN_FUNCTIONAL_ASSESSMENT: 0-10

## 2024-05-27 ASSESSMENT — PAIN DESCRIPTION - LOCATION: LOCATION: ABDOMEN

## 2024-05-27 ASSESSMENT — COGNITIVE AND FUNCTIONAL STATUS - GENERAL
CLIMB 3 TO 5 STEPS WITH RAILING: TOTAL
MOVING FROM LYING ON BACK TO SITTING ON SIDE OF FLAT BED WITH BEDRAILS: A LITTLE
HELP NEEDED FOR BATHING: A LOT
MOVING FROM LYING ON BACK TO SITTING ON SIDE OF FLAT BED WITH BEDRAILS: A LITTLE
PERSONAL GROOMING: A LOT
HELP NEEDED FOR BATHING: A LOT
PERSONAL GROOMING: A LOT
STANDING UP FROM CHAIR USING ARMS: A LOT
CLIMB 3 TO 5 STEPS WITH RAILING: TOTAL
DAILY ACTIVITIY SCORE: 14
TOILETING: A LOT
STANDING UP FROM CHAIR USING ARMS: A LOT
WALKING IN HOSPITAL ROOM: TOTAL
MOBILITY SCORE: 11
TOILETING: A LOT
DRESSING REGULAR LOWER BODY CLOTHING: A LOT
DRESSING REGULAR LOWER BODY CLOTHING: A LOT
TURNING FROM BACK TO SIDE WHILE IN FLAT BAD: A LOT
TURNING FROM BACK TO SIDE WHILE IN FLAT BAD: A LOT
MOVING TO AND FROM BED TO CHAIR: A LOT
MOVING TO AND FROM BED TO CHAIR: A LOT
MOBILITY SCORE: 11
DRESSING REGULAR UPPER BODY CLOTHING: A LOT
DAILY ACTIVITIY SCORE: 14
WALKING IN HOSPITAL ROOM: TOTAL
DRESSING REGULAR UPPER BODY CLOTHING: A LOT

## 2024-05-27 ASSESSMENT — PAIN SCALES - GENERAL
PAINLEVEL_OUTOF10: 10 - WORST POSSIBLE PAIN
PAINLEVEL_OUTOF10: 7
PAINLEVEL_OUTOF10: 10 - WORST POSSIBLE PAIN
PAINLEVEL_OUTOF10: 3
PAINLEVEL_OUTOF10: 9
PAINLEVEL_OUTOF10: 4
PAINLEVEL_OUTOF10: 0 - NO PAIN

## 2024-05-27 NOTE — PROGRESS NOTES
Grya Kim is a 63 y.o. female on day 0 of admission presenting with Gastrointestinal hemorrhage, unspecified gastrointestinal hemorrhage type.      Subjective   Patient fully evaluated on 827.  Continue present medications recheck labs in AM.  Appreciate GI consultation with endoscopy planned for tomorrow.  Anusol HC for hemorrhoids.       Objective     Last Recorded Vitals  /60   Pulse 96   Temp 36.3 °C (97.3 °F)   Resp 18   Wt 120 kg (263 lb 7.2 oz)   SpO2 91%   Intake/Output last 3 Shifts:    Intake/Output Summary (Last 24 hours) at 5/27/2024 1406  Last data filed at 5/26/2024 2320  Gross per 24 hour   Intake 20 ml   Output --   Net 20 ml       Admission Weight  Weight: 146 kg (321 lb 6.4 oz) (05/26/24 1530)    Daily Weight  05/27/24 : 120 kg (263 lb 7.2 oz)    Image Results  CT abdomen pelvis w IV contrast  Narrative: STUDY:  CT Abdomen and Pelvis with IV Contrast; 5/26/2024 at 5:23 PM.  INDICATION:  Bright red blood per rectum with diffuse abdominal pain, evaluate for  diverticular disease.  COMPARISON:  CT AP 1/23/2021.  ACCESSION NUMBER(S):  [id number]  ORDERING CLINICIAN:  EJ SO  TECHNIQUE:  CT of the abdomen and pelvis was performed.  Contiguous axial images  were obtained at 3 mm slice thickness through the abdomen and pelvis.   Coronal and sagittal reconstructions at 3 mm slice thickness were  performed.  Omnipaque 350 75 mL was administered intravenously.    FINDINGS:  LOWER CHEST:  No cardiomegaly.  No pericardial effusion.  Minimal bibasilar areas of  atelectasis     ABDOMEN:     LIVER:  No hepatomegaly.  Smooth surface contour.  Mild fatty infiltration of  the liver.     BILE DUCTS:  No intrahepatic or extrahepatic biliary ductal dilatation.     GALLBLADDER:  The gallbladder is absent.  STOMACH:  Moderate gastric wall thickening.  Suspect a gastric ulcer along the  anterior wall of the distal gastric body on axial image 37 measuring  2.4 cm x 1.5 cm.  No evidence for  perforation or surrounding  perigastric fat stranding..     PANCREAS:  No masses or ductal dilatation.     SPLEEN:  No splenomegaly or focal splenic lesion.     ADRENAL GLANDS:  No thickening or nodules.     KIDNEYS AND URETERS:  Kidneys are normal in size and location.  No renal or ureteral  calculi.     PELVIS:     BLADDER:  No abnormalities identified.     REPRODUCTIVE ORGANS:  No abnormalities identified.     BOWEL:  No abnormalities identified.  Appendix is normal.     VESSELS:  No abnormalities identified.  Abdominal aorta is normal in caliber.      PERITONEUM/RETROPERITONEUM/LYMPH NODES:  No free fluid.  No pneumoperitoneum.  No lymphadenopathy.     ABDOMINAL WALL:  No abnormalities identified.  SOFT TISSUES:   No abnormalities identified.     BONES:  No acute fracture or aggressive osseous lesion.  Impression: 1. Moderate gastric wall thickening suggestive of gastritis. Suspect a  gastric ulcer along the anterior wall of the distal gastric body on  axial measuring 2.4 cm x 1.5 cm. No evidence for perforation or  surrounding perigastric fat stranding. Recommend further evaluation  with endoscopy.  No other acute process.  No CT findings to account  for the patient's reported bright red blood per rectum.  2. Mild hepatic steatosis.  Signed by Yobani Escobedo MD      Physical Exam    Relevant Results               Assessment/Plan            Nahun Petty MD  Physician  Internal Medicine     H&P      Addendum     Date of Service: 5/26/2024  8:38 PM     Addendum       Expand All Collapse All    History Of Present Illness  Gray Kim is a 63-year-old female with past medical history of morbid obesity, bipolar II disorder, schizoaffective disorder, insomnia, COPD, ANN MARIE (not on CPAP), PE on xarelto, lumbar spinal stenosis, cervical radiculopathy, chronic pain, osteoarthritis, paroxysmal atrial fibrillation, SVT, and GERD.  Patient presents from skilled nursing facility with complaints of abdominal pain, bleeding  hemorrhoids, and nausea with hematemesis..  She reports symptoms ongoing for about 3 weeks.  She does report rectal pain due to hemorrhoids, has noted red blood on tissue paper with wiping.  States that she has been having frequent loose stools and has been on stool softeners.  Patient reports ongoing issues with nausea and abdominal pain he describes as generalized but worse in the epigastric area.  She had episode of vomiting with small amount of red blood in emesis.  Denies any known black stools or hematochezia.  Reports headache which she attributes to recent dental work, dysuria and frequent urination, on Lasix 3 times daily, bilateral lower extremity edema, chest discomfort in the upper sternum, intermittent cough with yellow sputum production.  Denies fevers, acute vision or hearing changes, palpitations, rapid heart rate, and open wounds     ER course: Tachycardic 108, otherwise hemodynamically stable, afebrile, SpO2 94% on room air. WBC 5.7, hemoglobin 12.3/hematocrit 38.2 (Hgb 12.2 3/9/24, normocytic, normochromic), platelets 298.  BUN 25 otherwise CMP is normal.  Lactate 1.7.  INR 1.0 CT A/P showed moderate gastric wall thickening suggestive of gastritis with suspected gastric ulcer measuring 2.4 cm x 1.5 cm along the anterior wall of the distal gastric body.  No evidence of perforation or surrounding perigastric fat stranding.  Mild hepatic steatosis.     CODE STATUS reviewed with patient, her desire is to be a full code     Past medical history: As above  Past surgical history: I&D lower extremity wounds, ORIF right tibia fracture  Social history: Former long-term smoker, quit approximately 6 months ago smoked 1 pack/day since the age of 26.  Denies illicit drug use or alcohol abuse.  Resident at a skilled nursing facility.  Family history: Mother-unknown, adopted; father-heart disease     Colonoscopy 5/27/2021     Impression:            - Preparation of the colon was fair.                         -  Moderate amount of semi-solid stool in the entire                          examined colon.                         - The examined portion of the terminal ileum was                          normal.                         - Multiple erosions at the splenic flexure.                         - Small non-bleeding external and internal hemorrhoids.                         - Random biopsies were taken with a cold forceps for                          histology in the rectum, in the left colon, at the                          splenic flexure, in the transverse colon, in the right                          colon and in the terminal ileum.     EGD 5/7/2021 (pathology negative for H .pylori)     Impression:            - Normal esophagus.                         - Random biopsies were taken with a cold forceps for                          histology in the mid esophagus and in the distal                          esophagus.                         - Moderately erythematous mucosa in the antrum and                          prepyloric region of the stomach. Biopsied.                         - Normal examined duodenum to D2. Biopsied     Past Medical History  Medical History        Past Medical History:   Diagnosis Date    Bipolar disorder, currently in remission, most recent episode unspecified (Multi) 02/11/2021     History of depressed bipolar disorder            Surgical History  Surgical History   No past surgical history on file.        Social History  She reports that she has quit smoking. Her smoking use included cigarettes. She has never used smokeless tobacco. She reports that she does not currently use alcohol. She reports that she does not currently use drugs.     Family History  Family History   No family history on file.        Allergies  Nsaids (non-steroidal anti-inflammatory drug), Aspirin, Penicillins, and Simvastatin     Review of Systems     10 point ROS negative except as noted above in HPI     Physical  "Exam  Constitutional:       General: She is awake. She is not in acute distress.     Appearance: Normal appearance. She is not toxic-appearing.   HENT:      Head: Atraumatic.      Nose: Nose normal.      Mouth/Throat:      Mouth: Mucous membranes are moist.   Eyes:      Conjunctiva/sclera: Conjunctivae normal.   Cardiovascular:      Rate and Rhythm: Normal rate and regular rhythm.      Pulses: Normal pulses.      Heart sounds: No murmur heard.  Pulmonary:      Effort: Pulmonary effort is normal. No respiratory distress.      Breath sounds: Normal breath sounds.   Abdominal:      General: Bowel sounds are normal. There is distension.      Palpations: There is no mass.      Tenderness: There is abdominal tenderness. There is no guarding or rebound.      Hernia: No hernia is present.   Musculoskeletal:         General: No deformity or signs of injury. Normal range of motion.      Cervical back: Neck supple.      Right lower leg: Edema present.      Left lower leg: Edema present.      Comments: Limited ROM 2/2 body habitus   Skin:     General: Skin is warm and dry.      Capillary Refill: Capillary refill takes less than 2 seconds.      Findings: No ecchymosis, erythema or wound.   Neurological:      General: No focal deficit present.      Mental Status: She is alert and oriented to person, place, and time.      Sensory: No sensory deficit.      Motor: No weakness.   Psychiatric:         Behavior: Behavior is cooperative.            Last Recorded Vitals  Blood pressure 140/83, pulse (!) 113, temperature 37 °C (98.6 °F), temperature source Tympanic, resp. rate 20, height 1.549 m (5' 1\"), weight 146 kg (321 lb 6.4 oz), SpO2 94%.     Relevant Results              Results for orders placed or performed during the hospital encounter of 05/26/24 (from the past 24 hour(s))   CBC and Auto Differential   Result Value Ref Range     WBC 5.7 4.4 - 11.3 x10*3/uL     nRBC 0.0 0.0 - 0.0 /100 WBCs     RBC 4.20 4.00 - 5.20 x10*6/uL     " Hemoglobin 12.3 12.0 - 16.0 g/dL     Hematocrit 38.2 36.0 - 46.0 %     MCV 91 80 - 100 fL     MCH 29.3 26.0 - 34.0 pg     MCHC 32.2 32.0 - 36.0 g/dL     RDW 13.4 11.5 - 14.5 %     Platelets 298 150 - 450 x10*3/uL     Neutrophils % 63.4 40.0 - 80.0 %     Immature Granulocytes %, Automated 1.8 (H) 0.0 - 0.9 %     Lymphocytes % 18.2 13.0 - 44.0 %     Monocytes % 12.3 2.0 - 10.0 %     Eosinophils % 3.9 0.0 - 6.0 %     Basophils % 0.4 0.0 - 2.0 %     Neutrophils Absolute 3.63 1.20 - 7.70 x10*3/uL     Immature Granulocytes Absolute, Automated 0.10 0.00 - 0.70 x10*3/uL     Lymphocytes Absolute 1.04 (L) 1.20 - 4.80 x10*3/uL     Monocytes Absolute 0.70 0.10 - 1.00 x10*3/uL     Eosinophils Absolute 0.22 0.00 - 0.70 x10*3/uL     Basophils Absolute 0.02 0.00 - 0.10 x10*3/uL   Basic metabolic panel   Result Value Ref Range     Glucose 95 74 - 99 mg/dL     Sodium 138 136 - 145 mmol/L     Potassium 5.0 3.5 - 5.3 mmol/L     Chloride 99 98 - 107 mmol/L     Bicarbonate 31 21 - 32 mmol/L     Anion Gap 13 10 - 20 mmol/L     Urea Nitrogen 25 (H) 6 - 23 mg/dL     Creatinine 0.78 0.50 - 1.05 mg/dL     eGFR 87 >60 mL/min/1.73m*2     Calcium 10.1 8.6 - 10.3 mg/dL   Lactate   Result Value Ref Range     Lactate 1.7 0.4 - 2.0 mmol/L   Hepatic function panel   Result Value Ref Range     Albumin 4.1 3.4 - 5.0 g/dL     Bilirubin, Total 0.5 0.0 - 1.2 mg/dL     Bilirubin, Direct 0.1 0.0 - 0.3 mg/dL     Alkaline Phosphatase 76 33 - 136 U/L     ALT 17 7 - 45 U/L     AST 15 9 - 39 U/L     Total Protein 6.9 6.4 - 8.2 g/dL   Protime-INR   Result Value Ref Range     Protime 11.6 9.8 - 12.8 seconds     INR 1.0 0.9 - 1.1   Type And Screen   Result Value Ref Range     ABO TYPE B       Rh TYPE NEG       ANTIBODY SCREEN NEG        CT abdomen pelvis w IV contrast     Result Date: 5/26/2024  STUDY: CT Abdomen and Pelvis with IV Contrast; 5/26/2024 at 5:23 PM. INDICATION: Bright red blood per rectum with diffuse abdominal pain, evaluate for diverticular disease.  COMPARISON: CT AP 1/23/2021. ACCESSION NUMBER(S): [id number] ORDERING CLINICIAN: EJ SO TECHNIQUE: CT of the abdomen and pelvis was performed.  Contiguous axial images were obtained at 3 mm slice thickness through the abdomen and pelvis. Coronal and sagittal reconstructions at 3 mm slice thickness were performed.  Omnipaque 350 75 mL was administered intravenously.  FINDINGS: LOWER CHEST: No cardiomegaly.  No pericardial effusion.  Minimal bibasilar areas of atelectasis  ABDOMEN:  LIVER: No hepatomegaly.  Smooth surface contour.  Mild fatty infiltration of the liver.  BILE DUCTS: No intrahepatic or extrahepatic biliary ductal dilatation.  GALLBLADDER: The gallbladder is absent. STOMACH: Moderate gastric wall thickening.  Suspect a gastric ulcer along the anterior wall of the distal gastric body on axial image 37 measuring 2.4 cm x 1.5 cm.  No evidence for perforation or surrounding perigastric fat stranding..  PANCREAS: No masses or ductal dilatation.  SPLEEN: No splenomegaly or focal splenic lesion.  ADRENAL GLANDS: No thickening or nodules.  KIDNEYS AND URETERS: Kidneys are normal in size and location.  No renal or ureteral calculi.  PELVIS:  BLADDER: No abnormalities identified.  REPRODUCTIVE ORGANS: No abnormalities identified.  BOWEL: No abnormalities identified.  Appendix is normal.  VESSELS: No abnormalities identified.  Abdominal aorta is normal in caliber.  PERITONEUM/RETROPERITONEUM/LYMPH NODES: No free fluid.  No pneumoperitoneum. No lymphadenopathy.  ABDOMINAL WALL: No abnormalities identified. SOFT TISSUES: No abnormalities identified.  BONES: No acute fracture or aggressive osseous lesion.     1. Moderate gastric wall thickening suggestive of gastritis. Suspect a gastric ulcer along the anterior wall of the distal gastric body on axial measuring 2.4 cm x 1.5 cm. No evidence for perforation or surrounding perigastric fat stranding. Recommend further evaluation with endoscopy.  No other acute  process.  No CT findings to account for the patient's reported bright red blood per rectum. 2. Mild hepatic steatosis. Signed by Yobani Escobedo MD            Assessment/Plan   Principal Problem:    Gastrointestinal hemorrhage, unspecified gastrointestinal hemorrhage type     63-year-old female with past medical history of morbid obesity, bipolar II disorder, schizoaffective disorder, insomnia, COPD, ANN MARIE (not on CPAP), PE on xarelto, lumbar spinal stenosis, cervical radiculopathy, chronic pain, osteoarthritis, paroxysmal atrial fibrillation, SVT, and GERD.  Patient presents from skilled nursing facility with complaints of abdominal pain, bleeding hemorrhoids, and nausea with hematemesis.     #Abdominal pain  #Abnormal imaging, concerning for gastric ulcer  #Hematemesis  #Bleeding hemorrhoids  #Diarrhea secondary to stool softeners.  #History of GERD     Consult gastroenterology  IV Protonix 40 mg twice daily  Repeat H&H this evening, stable on admission  Transfuse as needed for hemoglobin less than 7 or if patient is acutely symptomatic  Clear liquid diets tonight, n.p.o. after midnight for procedure  Zofran as needed  Simethicone as needed  Analgesics as needed  Anusol for treatment of hemorrhoids  Will hold stool softeners.  Consider stool studies if patient continues to have diarrhea  Hold Xarelto     #Dysuria  Check UA, follow-up findings -treat as appropriate     #Bilateral lower extremity edema/lymphedema  Will hold Lasix overnight, will resume if no evidence of bleeding  Monitor     Chronic conditions:  #Morbid obesity  #Bipolar II disorder  #Schizoaffective disorder  #COPD  #Insomnia  #ANN MARIE  #History PE  #Chronic back pain  #Lumbar spinal stenosis cervical radiculopathy  #History of arthritis  #Paroxysmal atrial fibrillation   #History SVT     Continue with patient's home medications as appropriate  Supplemental oxygen as needed     #DVT prophylaxis  Chemoprophylaxis on hold due to possible GI  bleed  SCDs  -Follow-up patient fully evaluated and plan as above                       Revision History                 Principal Problem:    Gastrointestinal hemorrhage, unspecified gastrointestinal hemorrhage type                Nahun Petty MD

## 2024-05-27 NOTE — CONSULTS
History of Present Illness:   Gray Kim is a 63 y.o. female with a PMH of bipolar disorder, schizoaffective disorder, insomnia, COPD, ANN MARIE (not on CPAP), PE (on Xarelto), morbid obesity, chronic back pain, OA, paroxysmal atrial fibrillation, SVT, and GERD who presented to the hospital with hematochezia and abdominal pain.  Patient states she felt like she had some hemorrhoidal bleeding with a little bit of bright red blood per rectum.  She also says that she has generalized abdominal pain.  CT imaging showed gastric wall thickening and concern for a gastric ulcer.  Hemoglobin appears to be at baseline (11.7).  BUN/creatinine ratio appear to be at baseline.        Review of Systems  ROS Negative unless otherwise stated above.    Past Medical History   has a past medical history of Bipolar disorder, currently in remission, most recent episode unspecified (Multi) (02/11/2021).     Social History   reports that she has quit smoking. Her smoking use included cigarettes. She has never used smokeless tobacco. She reports that she does not currently use alcohol. She reports that she does not currently use drugs.     Family History  family history is not on file.     Allergies  Allergies   Allergen Reactions    Nsaids (Non-Steroidal Anti-Inflammatory Drug) GI Upset    Aspirin GI Upset    Penicillins Hives and Rash    Simvastatin Hives and GI Upset       Medications  Current Outpatient Medications   Medication Instructions    ammonium lactate (Lac-Hydrin) 12 % lotion 1 Application, Topical, 2 times daily, Bilateral Lower Extremities    busPIRone (BUSPAR) 10 mg, oral, 3 times daily    clindamycin (CLEOCIN) 150 mg, oral, 3 times daily    divalproex (DEPAKOTE) 125 mg, oral, 2 times daily, Total dose 1125 mg    divalproex (DEPAKOTE) 1,000 mg, oral, 2 times daily, Total dose 1125 mg    docusate sodium (COLACE) 100 mg, oral, 2 times daily    DULoxetine (CYMBALTA) 60 mg, oral, Every morning    fluticasone (Flonase) 50  mcg/actuation nasal spray 2 sprays, Each Nostril, Daily    furosemide (LASIX) 20 mg, oral, 3 times daily (morning, midday, late afternoon)    HYDROcodone-acetaminophen (Norco) 5-325 mg tablet 1 tablet, oral, Every 12 hours PRN    hydrocortisone (Anusol-HC) 2.5 % rectal cream 1 Application, rectal, Every 6 hours PRN    Incruse Ellipta 62.5 mcg/actuation inhalation 1 puff, inhalation, Daily    Lactobacillus acidophilus capsule 1 capsule, oral, Daily    loratadine (CLARITIN) 10 mg, oral, 2 times daily    melatonin 3 mg capsule oral    melatonin 3 mg, oral, Daily    phenylephrine-cocoa butter (Hemorrhoidal) 0.25-88.44 % suppository 1 suppository, rectal, Nightly    rivaroxaban (XARELTO) 20 mg, oral, Daily with evening meal, Hold from 05/17/2024 to 05/20/2024    traZODone (DESYREL) 200 mg, oral, Nightly    ziprasidone (GEODON) 60 mg, oral, 2 times daily (morning and late afternoon)        Objective   Visit Vitals  /50   Pulse 92   Temp 36.4 °C (97.5 °F)   Resp 18        General: A&Ox3, NAD.  HEENT: AT/NC.   CV: RRR.   Resp: CTA bilaterally. No wheezing, rhonchi or rales.   GI: Soft, TTP throughout entire abdomen.   Extrem: + BLE edema. Pulses intact.  Skin: No Jaundice.   Neuro: No focal deficits.   Psych: Normal mood and affect.       Results from last 7 days   Lab Units 05/27/24  0652 05/27/24  0000 05/26/24  1528   WBC AUTO x10*3/uL 5.3  --  5.7   HEMOGLOBIN g/dL 11.7* 11.8* 12.3   HEMATOCRIT % 37.6 36.1 38.2   PLATELETS AUTO x10*3/uL 274  --  298     Results from last 7 days   Lab Units 05/27/24  0652 05/26/24  1528   SODIUM mmol/L 137 138   POTASSIUM mmol/L 4.8 5.0   CHLORIDE mmol/L 101 99   CO2 mmol/L 30 31   BUN mg/dL 20 25*   CREATININE mg/dL 0.70 0.78   CALCIUM mg/dL 9.2 10.1   PROTEIN TOTAL g/dL  --  6.9   BILIRUBIN TOTAL mg/dL  --  0.5   ALK PHOS U/L  --  76   ALT U/L  --  17   AST U/L  --  15   GLUCOSE mg/dL 84 95         ASSESSMENT/PLAN:  Gray Kim is a 63 y.o. female with a PMH of bipolar  disorder, schizoaffective disorder, insomnia, COPD, ANN MARIE (not on CPAP), PE (on Xarelto), morbid obesity, chronic back pain, OA, paroxysmal atrial fibrillation, SVT, and GERD who presented to the hospital with hematochezia and abdominal pain.      -Please continue to hold Xarelto.  -Avoid NSAIDs.  -PPI twice daily.  -EGD tomorrow.  -Has outpatient colonoscopy scheduled for late June.      Julio Narvaez DO  GI Attending

## 2024-05-27 NOTE — NURSING NOTE
Per Jesenia at [facility], pt takes 200mg trazodone and 3 mg melatonin at hs.  Added to home med rec, provider notified.

## 2024-05-27 NOTE — H&P
History Of Present Illness  Gray Kim is a 63-year-old female with past medical history of morbid obesity, bipolar II disorder, schizoaffective disorder, insomnia, COPD, ANN MARIE (not on CPAP), PE on xarelto, lumbar spinal stenosis, cervical radiculopathy, chronic pain, osteoarthritis, paroxysmal atrial fibrillation, SVT, and GERD.  Patient presents from skilled nursing facility with complaints of abdominal pain, bleeding hemorrhoids, and nausea with hematemesis..  She reports symptoms ongoing for about 3 weeks.  She does report rectal pain due to hemorrhoids, has noted red blood on tissue paper with wiping.  States that she has been having frequent loose stools and has been on stool softeners.  Patient reports ongoing issues with nausea and abdominal pain he describes as generalized but worse in the epigastric area.  She had episode of vomiting with small amount of red blood in emesis.  Denies any known black stools or hematochezia.  Reports headache which she attributes to recent dental work, dysuria and frequent urination, on Lasix 3 times daily, bilateral lower extremity edema, chest discomfort in the upper sternum, intermittent cough with yellow sputum production.  Denies fevers, acute vision or hearing changes, palpitations, rapid heart rate, and open wounds    ER course: Tachycardic 108, otherwise hemodynamically stable, afebrile, SpO2 94% on room air. WBC 5.7, hemoglobin 12.3/hematocrit 38.2 (Hgb 12.2 3/9/24, normocytic, normochromic), platelets 298.  BUN 25 otherwise CMP is normal.  Lactate 1.7.  INR 1.0 CT A/P showed moderate gastric wall thickening suggestive of gastritis with suspected gastric ulcer measuring 2.4 cm x 1.5 cm along the anterior wall of the distal gastric body.  No evidence of perforation or surrounding perigastric fat stranding.  Mild hepatic steatosis.    CODE STATUS reviewed with patient, her desire is to be a full code    Past medical history: As above  Past surgical history: I&D lower  extremity wounds, ORIF right tibia fracture  Social history: Former long-term smoker, quit approximately 6 months ago smoked 1 pack/day since the age of 26.  Denies illicit drug use or alcohol abuse.  Resident at a skilled nursing facility.  Family history: Mother-unknown, adopted; father-heart disease    Colonoscopy 5/27/2021    Impression:            - Preparation of the colon was fair.                         - Moderate amount of semi-solid stool in the entire                          examined colon.                         - The examined portion of the terminal ileum was                          normal.                         - Multiple erosions at the splenic flexure.                         - Small non-bleeding external and internal hemorrhoids.                         - Random biopsies were taken with a cold forceps for                          histology in the rectum, in the left colon, at the                          splenic flexure, in the transverse colon, in the right                          colon and in the terminal ileum.    EGD 5/7/2021 (pathology negative for H .pylori)    Impression:            - Normal esophagus.                         - Random biopsies were taken with a cold forceps for                          histology in the mid esophagus and in the distal                          esophagus.                         - Moderately erythematous mucosa in the antrum and                          prepyloric region of the stomach. Biopsied.                         - Normal examined duodenum to D2. Biopsied     Past Medical History  Past Medical History:   Diagnosis Date    Bipolar disorder, currently in remission, most recent episode unspecified (Multi) 02/11/2021    History of depressed bipolar disorder       Surgical History  No past surgical history on file.     Social History  She reports that she has quit smoking. Her smoking use included cigarettes. She has never used smokeless tobacco. She  "reports that she does not currently use alcohol. She reports that she does not currently use drugs.    Family History  No family history on file.     Allergies  Nsaids (non-steroidal anti-inflammatory drug), Aspirin, Penicillins, and Simvastatin    Review of Systems    10 point ROS negative except as noted above in HPI     Physical Exam  Constitutional:       General: She is awake. She is not in acute distress.     Appearance: Normal appearance. She is not toxic-appearing.   HENT:      Head: Atraumatic.      Nose: Nose normal.      Mouth/Throat:      Mouth: Mucous membranes are moist.   Eyes:      Conjunctiva/sclera: Conjunctivae normal.   Cardiovascular:      Rate and Rhythm: Normal rate and regular rhythm.      Pulses: Normal pulses.      Heart sounds: No murmur heard.  Pulmonary:      Effort: Pulmonary effort is normal. No respiratory distress.      Breath sounds: Normal breath sounds.   Abdominal:      General: Bowel sounds are normal. There is distension.      Palpations: There is no mass.      Tenderness: There is abdominal tenderness. There is no guarding or rebound.      Hernia: No hernia is present.   Musculoskeletal:         General: No deformity or signs of injury. Normal range of motion.      Cervical back: Neck supple.      Right lower leg: Edema present.      Left lower leg: Edema present.      Comments: Limited ROM 2/2 body habitus   Skin:     General: Skin is warm and dry.      Capillary Refill: Capillary refill takes less than 2 seconds.      Findings: No ecchymosis, erythema or wound.   Neurological:      General: No focal deficit present.      Mental Status: She is alert and oriented to person, place, and time.      Sensory: No sensory deficit.      Motor: No weakness.   Psychiatric:         Behavior: Behavior is cooperative.          Last Recorded Vitals  Blood pressure 140/83, pulse (!) 113, temperature 37 °C (98.6 °F), temperature source Tympanic, resp. rate 20, height 1.549 m (5' 1\"), weight " 146 kg (321 lb 6.4 oz), SpO2 94%.    Relevant Results      Results for orders placed or performed during the hospital encounter of 05/26/24 (from the past 24 hour(s))   CBC and Auto Differential   Result Value Ref Range    WBC 5.7 4.4 - 11.3 x10*3/uL    nRBC 0.0 0.0 - 0.0 /100 WBCs    RBC 4.20 4.00 - 5.20 x10*6/uL    Hemoglobin 12.3 12.0 - 16.0 g/dL    Hematocrit 38.2 36.0 - 46.0 %    MCV 91 80 - 100 fL    MCH 29.3 26.0 - 34.0 pg    MCHC 32.2 32.0 - 36.0 g/dL    RDW 13.4 11.5 - 14.5 %    Platelets 298 150 - 450 x10*3/uL    Neutrophils % 63.4 40.0 - 80.0 %    Immature Granulocytes %, Automated 1.8 (H) 0.0 - 0.9 %    Lymphocytes % 18.2 13.0 - 44.0 %    Monocytes % 12.3 2.0 - 10.0 %    Eosinophils % 3.9 0.0 - 6.0 %    Basophils % 0.4 0.0 - 2.0 %    Neutrophils Absolute 3.63 1.20 - 7.70 x10*3/uL    Immature Granulocytes Absolute, Automated 0.10 0.00 - 0.70 x10*3/uL    Lymphocytes Absolute 1.04 (L) 1.20 - 4.80 x10*3/uL    Monocytes Absolute 0.70 0.10 - 1.00 x10*3/uL    Eosinophils Absolute 0.22 0.00 - 0.70 x10*3/uL    Basophils Absolute 0.02 0.00 - 0.10 x10*3/uL   Basic metabolic panel   Result Value Ref Range    Glucose 95 74 - 99 mg/dL    Sodium 138 136 - 145 mmol/L    Potassium 5.0 3.5 - 5.3 mmol/L    Chloride 99 98 - 107 mmol/L    Bicarbonate 31 21 - 32 mmol/L    Anion Gap 13 10 - 20 mmol/L    Urea Nitrogen 25 (H) 6 - 23 mg/dL    Creatinine 0.78 0.50 - 1.05 mg/dL    eGFR 87 >60 mL/min/1.73m*2    Calcium 10.1 8.6 - 10.3 mg/dL   Lactate   Result Value Ref Range    Lactate 1.7 0.4 - 2.0 mmol/L   Hepatic function panel   Result Value Ref Range    Albumin 4.1 3.4 - 5.0 g/dL    Bilirubin, Total 0.5 0.0 - 1.2 mg/dL    Bilirubin, Direct 0.1 0.0 - 0.3 mg/dL    Alkaline Phosphatase 76 33 - 136 U/L    ALT 17 7 - 45 U/L    AST 15 9 - 39 U/L    Total Protein 6.9 6.4 - 8.2 g/dL   Protime-INR   Result Value Ref Range    Protime 11.6 9.8 - 12.8 seconds    INR 1.0 0.9 - 1.1   Type And Screen   Result Value Ref Range    ABO TYPE B      Rh TYPE NEG     ANTIBODY SCREEN NEG      CT abdomen pelvis w IV contrast    Result Date: 5/26/2024  STUDY: CT Abdomen and Pelvis with IV Contrast; 5/26/2024 at 5:23 PM. INDICATION: Bright red blood per rectum with diffuse abdominal pain, evaluate for diverticular disease. COMPARISON: CT AP 1/23/2021. ACCESSION NUMBER(S): [id number] ORDERING CLINICIAN: EJ SO TECHNIQUE: CT of the abdomen and pelvis was performed.  Contiguous axial images were obtained at 3 mm slice thickness through the abdomen and pelvis. Coronal and sagittal reconstructions at 3 mm slice thickness were performed.  Omnipaque 350 75 mL was administered intravenously.  FINDINGS: LOWER CHEST: No cardiomegaly.  No pericardial effusion.  Minimal bibasilar areas of atelectasis  ABDOMEN:  LIVER: No hepatomegaly.  Smooth surface contour.  Mild fatty infiltration of the liver.  BILE DUCTS: No intrahepatic or extrahepatic biliary ductal dilatation.  GALLBLADDER: The gallbladder is absent. STOMACH: Moderate gastric wall thickening.  Suspect a gastric ulcer along the anterior wall of the distal gastric body on axial image 37 measuring 2.4 cm x 1.5 cm.  No evidence for perforation or surrounding perigastric fat stranding..  PANCREAS: No masses or ductal dilatation.  SPLEEN: No splenomegaly or focal splenic lesion.  ADRENAL GLANDS: No thickening or nodules.  KIDNEYS AND URETERS: Kidneys are normal in size and location.  No renal or ureteral calculi.  PELVIS:  BLADDER: No abnormalities identified.  REPRODUCTIVE ORGANS: No abnormalities identified.  BOWEL: No abnormalities identified.  Appendix is normal.  VESSELS: No abnormalities identified.  Abdominal aorta is normal in caliber.  PERITONEUM/RETROPERITONEUM/LYMPH NODES: No free fluid.  No pneumoperitoneum. No lymphadenopathy.  ABDOMINAL WALL: No abnormalities identified. SOFT TISSUES: No abnormalities identified.  BONES: No acute fracture or aggressive osseous lesion.    1. Moderate gastric wall  thickening suggestive of gastritis. Suspect a gastric ulcer along the anterior wall of the distal gastric body on axial measuring 2.4 cm x 1.5 cm. No evidence for perforation or surrounding perigastric fat stranding. Recommend further evaluation with endoscopy.  No other acute process.  No CT findings to account for the patient's reported bright red blood per rectum. 2. Mild hepatic steatosis. Signed by Yobani Escobedo MD        Assessment/Plan   Principal Problem:    Gastrointestinal hemorrhage, unspecified gastrointestinal hemorrhage type    63-year-old female with past medical history of morbid obesity, bipolar II disorder, schizoaffective disorder, insomnia, COPD, ANN MARIE (not on CPAP), PE on xarelto, lumbar spinal stenosis, cervical radiculopathy, chronic pain, osteoarthritis, paroxysmal atrial fibrillation, SVT, and GERD.  Patient presents from skilled nursing facility with complaints of abdominal pain, bleeding hemorrhoids, and nausea with hematemesis.    #Abdominal pain  #Abnormal imaging, concerning for gastric ulcer  #Hematemesis  #Bleeding hemorrhoids  #Diarrhea secondary to stool softeners.  #History of GERD    Consult gastroenterology  IV Protonix 40 mg twice daily  Repeat H&H this evening, stable on admission  Transfuse as needed for hemoglobin less than 7 or if patient is acutely symptomatic  Clear liquid diets tonight, n.p.o. after midnight for procedure  Zofran as needed  Simethicone as needed  Analgesics as needed  Anusol for treatment of hemorrhoids  Will hold stool softeners.  Consider stool studies if patient continues to have diarrhea  Hold Xarelto    #Dysuria  Check UA, follow-up findings -treat as appropriate    #Bilateral lower extremity edema/lymphedema  Will hold Lasix overnight, will resume if no evidence of bleeding  Monitor    Chronic conditions:  #Morbid obesity  #Bipolar II disorder  #Schizoaffective disorder  #COPD  #Insomnia  #ANN MARIE  #History PE  #Chronic back pain  #Lumbar spinal stenosis  cervical radiculopathy  #History of arthritis  #Paroxysmal atrial fibrillation   #History SVT    Continue with patient's home medications as appropriate  Supplemental oxygen as needed    #DVT prophylaxis  Chemoprophylaxis on hold due to possible GI bleed  SCDs  -Follow-up patient fully evaluated and plan as above  Talat Hillman, APRN-CNP

## 2024-05-27 NOTE — PROGRESS NOTES
05/27/24 1222   Discharge Planning   Living Arrangements Alone   Assistance Needed walker   Type of Residence Nursing home/residential care   Patient expects to be discharged to: Return to [facility] ([facility])     5/27/2024  Met with pt in room, introduced self and explained role.  Verified insurance, address, phone.   PCP- Dr Petty.  Pt stated she is from [facility], and plans to return upon discharge.    Asked [facility] to make return referral.   Zenaida Van RN [facility]

## 2024-05-27 NOTE — CARE PLAN
Problem: Safety - Adult  Goal: Free from fall injury  Outcome: Progressing     The patient's goals for the shift include comfort and pain control    The clinical goals for the shift include comfort and pain control

## 2024-05-28 ENCOUNTER — APPOINTMENT (OUTPATIENT)
Dept: GASTROENTEROLOGY | Facility: HOSPITAL | Age: 64
End: 2024-05-28
Payer: COMMERCIAL

## 2024-05-28 ENCOUNTER — ANESTHESIA EVENT (OUTPATIENT)
Dept: GASTROENTEROLOGY | Facility: HOSPITAL | Age: 64
End: 2024-05-28
Payer: COMMERCIAL

## 2024-05-28 ENCOUNTER — ANESTHESIA (OUTPATIENT)
Dept: GASTROENTEROLOGY | Facility: HOSPITAL | Age: 64
End: 2024-05-28
Payer: COMMERCIAL

## 2024-05-28 VITALS
OXYGEN SATURATION: 87 % | HEIGHT: 61 IN | BODY MASS INDEX: 49.74 KG/M2 | DIASTOLIC BLOOD PRESSURE: 70 MMHG | WEIGHT: 263.45 LBS | HEART RATE: 103 BPM | SYSTOLIC BLOOD PRESSURE: 137 MMHG | TEMPERATURE: 97.3 F | RESPIRATION RATE: 18 BRPM

## 2024-05-28 LAB
ANION GAP SERPL CALC-SCNC: 10 MMOL/L (ref 10–20)
BUN SERPL-MCNC: 13 MG/DL (ref 6–23)
CALCIUM SERPL-MCNC: 9 MG/DL (ref 8.6–10.3)
CHLORIDE SERPL-SCNC: 100 MMOL/L (ref 98–107)
CO2 SERPL-SCNC: 32 MMOL/L (ref 21–32)
CREAT SERPL-MCNC: 0.65 MG/DL (ref 0.5–1.05)
EGFRCR SERPLBLD CKD-EPI 2021: >90 ML/MIN/1.73M*2
ERYTHROCYTE [DISTWIDTH] IN BLOOD BY AUTOMATED COUNT: 13.4 % (ref 11.5–14.5)
GLUCOSE SERPL-MCNC: 83 MG/DL (ref 74–99)
HCT VFR BLD AUTO: 37.7 % (ref 36–46)
HGB BLD-MCNC: 11.7 G/DL (ref 12–16)
MCH RBC QN AUTO: 29.1 PG (ref 26–34)
MCHC RBC AUTO-ENTMCNC: 31 G/DL (ref 32–36)
MCV RBC AUTO: 94 FL (ref 80–100)
NRBC BLD-RTO: 0 /100 WBCS (ref 0–0)
PLATELET # BLD AUTO: 260 X10*3/UL (ref 150–450)
POTASSIUM SERPL-SCNC: 4.7 MMOL/L (ref 3.5–5.3)
RBC # BLD AUTO: 4.02 X10*6/UL (ref 4–5.2)
SODIUM SERPL-SCNC: 137 MMOL/L (ref 136–145)
WBC # BLD AUTO: 5.4 X10*3/UL (ref 4.4–11.3)

## 2024-05-28 PROCEDURE — G0378 HOSPITAL OBSERVATION PER HR: HCPCS

## 2024-05-28 PROCEDURE — 2500000005 HC RX 250 GENERAL PHARMACY W/O HCPCS: Performed by: ANESTHESIOLOGIST ASSISTANT

## 2024-05-28 PROCEDURE — 2500000005 HC RX 250 GENERAL PHARMACY W/O HCPCS: Performed by: NURSE PRACTITIONER

## 2024-05-28 PROCEDURE — 36415 COLL VENOUS BLD VENIPUNCTURE: CPT | Performed by: NURSE PRACTITIONER

## 2024-05-28 PROCEDURE — 96376 TX/PRO/DX INJ SAME DRUG ADON: CPT | Performed by: INTERNAL MEDICINE

## 2024-05-28 PROCEDURE — 2500000001 HC RX 250 WO HCPCS SELF ADMINISTERED DRUGS (ALT 637 FOR MEDICARE OP): Performed by: NURSE PRACTITIONER

## 2024-05-28 PROCEDURE — 3700000001 HC GENERAL ANESTHESIA TIME - INITIAL BASE CHARGE

## 2024-05-28 PROCEDURE — 73502 X-RAY EXAM HIP UNI 2-3 VIEWS: CPT | Mod: RIGHT SIDE | Performed by: STUDENT IN AN ORGANIZED HEALTH CARE EDUCATION/TRAINING PROGRAM

## 2024-05-28 PROCEDURE — 2500000004 HC RX 250 GENERAL PHARMACY W/ HCPCS (ALT 636 FOR OP/ED): Performed by: STUDENT IN AN ORGANIZED HEALTH CARE EDUCATION/TRAINING PROGRAM

## 2024-05-28 PROCEDURE — 2500000004 HC RX 250 GENERAL PHARMACY W/ HCPCS (ALT 636 FOR OP/ED): Performed by: NURSE PRACTITIONER

## 2024-05-28 PROCEDURE — 2500000001 HC RX 250 WO HCPCS SELF ADMINISTERED DRUGS (ALT 637 FOR MEDICARE OP)

## 2024-05-28 PROCEDURE — 43239 EGD BIOPSY SINGLE/MULTIPLE: CPT | Performed by: STUDENT IN AN ORGANIZED HEALTH CARE EDUCATION/TRAINING PROGRAM

## 2024-05-28 PROCEDURE — C9113 INJ PANTOPRAZOLE SODIUM, VIA: HCPCS | Performed by: NURSE PRACTITIONER

## 2024-05-28 PROCEDURE — 2500000001 HC RX 250 WO HCPCS SELF ADMINISTERED DRUGS (ALT 637 FOR MEDICARE OP): Performed by: INTERNAL MEDICINE

## 2024-05-28 PROCEDURE — 2500000001 HC RX 250 WO HCPCS SELF ADMINISTERED DRUGS (ALT 637 FOR MEDICARE OP): Performed by: STUDENT IN AN ORGANIZED HEALTH CARE EDUCATION/TRAINING PROGRAM

## 2024-05-28 PROCEDURE — 7100000001 HC RECOVERY ROOM TIME - INITIAL BASE CHARGE

## 2024-05-28 PROCEDURE — 94640 AIRWAY INHALATION TREATMENT: CPT

## 2024-05-28 PROCEDURE — 2500000004 HC RX 250 GENERAL PHARMACY W/ HCPCS (ALT 636 FOR OP/ED): Performed by: ANESTHESIOLOGIST ASSISTANT

## 2024-05-28 PROCEDURE — 80048 BASIC METABOLIC PNL TOTAL CA: CPT | Performed by: NURSE PRACTITIONER

## 2024-05-28 PROCEDURE — 7100000002 HC RECOVERY ROOM TIME - EACH INCREMENTAL 1 MINUTE

## 2024-05-28 PROCEDURE — 3700000002 HC GENERAL ANESTHESIA TIME - EACH INCREMENTAL 1 MINUTE

## 2024-05-28 PROCEDURE — 0753T DGTZ GLS MCRSCP SLD LEVEL IV: CPT | Mod: TC,PARLAB | Performed by: STUDENT IN AN ORGANIZED HEALTH CARE EDUCATION/TRAINING PROGRAM

## 2024-05-28 PROCEDURE — 85027 COMPLETE CBC AUTOMATED: CPT | Performed by: NURSE PRACTITIONER

## 2024-05-28 RX ORDER — ACETAMINOPHEN 325 MG/1
650 TABLET ORAL EVERY 8 HOURS PRN
Qty: 30 TABLET | Refills: 0 | Status: SHIPPED | OUTPATIENT
Start: 2024-05-28

## 2024-05-28 RX ORDER — SIMETHICONE 80 MG
80 TABLET,CHEWABLE ORAL 4 TIMES DAILY
Status: DISCONTINUED | OUTPATIENT
Start: 2024-05-28 | End: 2024-05-28 | Stop reason: HOSPADM

## 2024-05-28 RX ORDER — SIMETHICONE 80 MG
80 TABLET,CHEWABLE ORAL 4 TIMES DAILY
Start: 2024-05-28

## 2024-05-28 RX ORDER — POLYETHYLENE GLYCOL 3350 17 G/17G
17 POWDER, FOR SOLUTION ORAL 2 TIMES DAILY
Start: 2024-05-28

## 2024-05-28 RX ORDER — HYDROCORTISONE ACETATE 25 MG/1
25 SUPPOSITORY RECTAL 2 TIMES DAILY
Start: 2024-05-28

## 2024-05-28 RX ORDER — LIDOCAINE HYDROCHLORIDE 20 MG/ML
JELLY TOPICAL
Start: 2024-05-28

## 2024-05-28 RX ORDER — POLYETHYLENE GLYCOL 3350 17 G/17G
17 POWDER, FOR SOLUTION ORAL 2 TIMES DAILY
Status: DISCONTINUED | OUTPATIENT
Start: 2024-05-28 | End: 2024-05-28 | Stop reason: HOSPADM

## 2024-05-28 RX ORDER — LIDOCAINE HYDROCHLORIDE 20 MG/ML
INJECTION, SOLUTION INFILTRATION; PERINEURAL AS NEEDED
Status: DISCONTINUED | OUTPATIENT
Start: 2024-05-28 | End: 2024-05-28

## 2024-05-28 RX ORDER — RIVASTIGMINE 4.6 MG/24H
1 PATCH, EXTENDED RELEASE TRANSDERMAL DAILY
Status: DISCONTINUED | OUTPATIENT
Start: 2024-05-28 | End: 2024-05-28

## 2024-05-28 RX ORDER — PROPOFOL 10 MG/ML
INJECTION, EMULSION INTRAVENOUS CONTINUOUS PRN
Status: DISCONTINUED | OUTPATIENT
Start: 2024-05-28 | End: 2024-05-28

## 2024-05-28 RX ORDER — LIDOCAINE HYDROCHLORIDE 20 MG/ML
JELLY TOPICAL
Status: DISCONTINUED | OUTPATIENT
Start: 2024-05-28 | End: 2024-05-28 | Stop reason: HOSPADM

## 2024-05-28 RX ADMIN — PROPOFOL 50 MG: 10 INJECTION, EMULSION INTRAVENOUS at 12:13

## 2024-05-28 RX ADMIN — ACETAMINOPHEN 650 MG: 325 TABLET ORAL at 08:37

## 2024-05-28 RX ADMIN — LORATADINE 10 MG: 10 TABLET ORAL at 08:40

## 2024-05-28 RX ADMIN — ONDANSETRON 4 MG: 2 INJECTION INTRAMUSCULAR; INTRAVENOUS at 18:48

## 2024-05-28 RX ADMIN — SIMETHICONE 80 MG: 80 TABLET, CHEWABLE ORAL at 18:08

## 2024-05-28 RX ADMIN — DIVALPROEX SODIUM 1000 MG: 250 TABLET, DELAYED RELEASE ORAL at 21:09

## 2024-05-28 RX ADMIN — HYDROCODONE BITARTRATE AND ACETAMINOPHEN 1 TABLET: 5; 325 TABLET ORAL at 21:10

## 2024-05-28 RX ADMIN — ZIPRASIDONE HCL 60 MG: 40 CAPSULE ORAL at 19:01

## 2024-05-28 RX ADMIN — ONDANSETRON 4 MG: 2 INJECTION INTRAMUSCULAR; INTRAVENOUS at 09:18

## 2024-05-28 RX ADMIN — SIMETHICONE 120 MG: 80 TABLET, CHEWABLE ORAL at 08:40

## 2024-05-28 RX ADMIN — PROPOFOL 20 MG: 10 INJECTION, EMULSION INTRAVENOUS at 12:18

## 2024-05-28 RX ADMIN — CLINDAMYCIN HYDROCHLORIDE 150 MG: 150 CAPSULE ORAL at 08:35

## 2024-05-28 RX ADMIN — HYDROCORTISONE ACETATE 25 MG: 25 SUPPOSITORY RECTAL at 21:09

## 2024-05-28 RX ADMIN — DIVALPROEX SODIUM 1000 MG: 250 TABLET, DELAYED RELEASE ORAL at 08:36

## 2024-05-28 RX ADMIN — Medication 1 APPLICATION: at 21:22

## 2024-05-28 RX ADMIN — Medication 1 TABLET: at 08:40

## 2024-05-28 RX ADMIN — TIOTROPIUM BROMIDE INHALATION SPRAY 2 PUFF: 3.12 SPRAY, METERED RESPIRATORY (INHALATION) at 07:36

## 2024-05-28 RX ADMIN — SODIUM CHLORIDE, SODIUM LACTATE, POTASSIUM CHLORIDE, AND CALCIUM CHLORIDE: .6; .31; .03; .02 INJECTION, SOLUTION INTRAVENOUS at 12:05

## 2024-05-28 RX ADMIN — CLINDAMYCIN HYDROCHLORIDE 150 MG: 150 CAPSULE ORAL at 21:09

## 2024-05-28 RX ADMIN — TRAZODONE HYDROCHLORIDE 200 MG: 50 TABLET ORAL at 21:09

## 2024-05-28 RX ADMIN — POLYETHYLENE GLYCOL 3350 17 G: 17 POWDER, FOR SOLUTION ORAL at 21:09

## 2024-05-28 RX ADMIN — HYDROCODONE BITARTRATE AND ACETAMINOPHEN 1 TABLET: 5; 325 TABLET ORAL at 13:57

## 2024-05-28 RX ADMIN — SIMETHICONE 80 MG: 80 TABLET, CHEWABLE ORAL at 13:26

## 2024-05-28 RX ADMIN — ONDANSETRON 4 MG: 2 INJECTION INTRAMUSCULAR; INTRAVENOUS at 03:41

## 2024-05-28 RX ADMIN — Medication 3 MG: at 21:09

## 2024-05-28 RX ADMIN — HYDROCORTISONE ACETATE 25 MG: 25 SUPPOSITORY RECTAL at 09:20

## 2024-05-28 RX ADMIN — SIMETHICONE 120 MG: 80 TABLET, CHEWABLE ORAL at 03:41

## 2024-05-28 RX ADMIN — BUSPIRONE HYDROCHLORIDE 10 MG: 10 TABLET ORAL at 14:05

## 2024-05-28 RX ADMIN — Medication 1 APPLICATION: at 08:47

## 2024-05-28 RX ADMIN — CLINDAMYCIN HYDROCHLORIDE 150 MG: 150 CAPSULE ORAL at 14:05

## 2024-05-28 RX ADMIN — PANTOPRAZOLE SODIUM 40 MG: 40 INJECTION, POWDER, FOR SOLUTION INTRAVENOUS at 07:57

## 2024-05-28 RX ADMIN — BUSPIRONE HYDROCHLORIDE 10 MG: 10 TABLET ORAL at 21:09

## 2024-05-28 RX ADMIN — HYDROCODONE BITARTRATE AND ACETAMINOPHEN 1 TABLET: 5; 325 TABLET ORAL at 03:41

## 2024-05-28 RX ADMIN — LIDOCAINE HYDROCHLORIDE 35 MG: 20 INJECTION, SOLUTION INFILTRATION; PERINEURAL at 12:12

## 2024-05-28 RX ADMIN — HYDROCODONE BITARTRATE AND ACETAMINOPHEN 1 TABLET: 5; 325 TABLET ORAL at 08:40

## 2024-05-28 RX ADMIN — PROPOFOL 20 MG: 10 INJECTION, EMULSION INTRAVENOUS at 12:15

## 2024-05-28 RX ADMIN — PROPOFOL 100 MCG/KG/MIN: 10 INJECTION, EMULSION INTRAVENOUS at 12:12

## 2024-05-28 RX ADMIN — LIDOCAINE HYDROCHLORIDE: 20 JELLY TOPICAL at 19:01

## 2024-05-28 RX ADMIN — DULOXETINE HYDROCHLORIDE 60 MG: 30 CAPSULE, DELAYED RELEASE ORAL at 08:40

## 2024-05-28 RX ADMIN — POLYETHYLENE GLYCOL 3350 17 G: 17 POWDER, FOR SOLUTION ORAL at 13:26

## 2024-05-28 RX ADMIN — BUSPIRONE HYDROCHLORIDE 10 MG: 10 TABLET ORAL at 08:40

## 2024-05-28 RX ADMIN — SIMETHICONE 80 MG: 80 TABLET, CHEWABLE ORAL at 21:09

## 2024-05-28 RX ADMIN — PANTOPRAZOLE SODIUM 40 MG: 40 INJECTION, POWDER, FOR SOLUTION INTRAVENOUS at 21:10

## 2024-05-28 RX ADMIN — FLUTICASONE PROPIONATE 2 SPRAY: 50 SPRAY, METERED NASAL at 08:36

## 2024-05-28 RX ADMIN — DIVALPROEX SODIUM 125 MG: 125 TABLET, DELAYED RELEASE ORAL at 08:45

## 2024-05-28 RX ADMIN — ZIPRASIDONE HCL 60 MG: 40 CAPSULE ORAL at 08:36

## 2024-05-28 SDOH — HEALTH STABILITY: MENTAL HEALTH: CURRENT SMOKER: 0

## 2024-05-28 ASSESSMENT — COGNITIVE AND FUNCTIONAL STATUS - GENERAL
DRESSING REGULAR UPPER BODY CLOTHING: A LOT
MOBILITY SCORE: 11
EATING MEALS: A LOT
STANDING UP FROM CHAIR USING ARMS: A LOT
CLIMB 3 TO 5 STEPS WITH RAILING: TOTAL
HELP NEEDED FOR BATHING: A LOT
MOVING FROM LYING ON BACK TO SITTING ON SIDE OF FLAT BED WITH BEDRAILS: A LITTLE
WALKING IN HOSPITAL ROOM: TOTAL
TOILETING: A LOT
PERSONAL GROOMING: A LOT
MOVING TO AND FROM BED TO CHAIR: A LOT
DRESSING REGULAR LOWER BODY CLOTHING: A LOT
DAILY ACTIVITIY SCORE: 12
TURNING FROM BACK TO SIDE WHILE IN FLAT BAD: A LOT

## 2024-05-28 ASSESSMENT — PAIN - FUNCTIONAL ASSESSMENT
PAIN_FUNCTIONAL_ASSESSMENT: 0-10
PAIN_FUNCTIONAL_ASSESSMENT: 0-10

## 2024-05-28 ASSESSMENT — PAIN SCALES - GENERAL
PAINLEVEL_OUTOF10: 4
PAINLEVEL_OUTOF10: 9
PAINLEVEL_OUTOF10: 7
PAINLEVEL_OUTOF10: 3
PAINLEVEL_OUTOF10: 0 - NO PAIN
PAIN_LEVEL: 0
PAINLEVEL_OUTOF10: 3
PAINLEVEL_OUTOF10: 0 - NO PAIN

## 2024-05-28 ASSESSMENT — PAIN DESCRIPTION - ORIENTATION: ORIENTATION: RIGHT;LEFT

## 2024-05-28 ASSESSMENT — PAIN DESCRIPTION - LOCATION: LOCATION: LEG

## 2024-05-28 NOTE — ANESTHESIA PREPROCEDURE EVALUATION
Patient: Gray Kim    Procedure Information       Date/Time: 05/28/24 1150    Scheduled providers: Jose Gonzalez MD; Torsten Pedraza MD    Procedure: EGD    Location: [facility]            Relevant Problems   Anesthesia (within normal limits)      Cardiac   (+) Hyperlipemia, mixed   (+) Paroxysmal atrial fibrillation (Multi)      Pulmonary   (+) COPD mixed type (Multi)      Neuro   (+) Schizoaffective disorder, bipolar type (Multi)      GI   (+) GERD without esophagitis   (+) Gastrointestinal hemorrhage, unspecified gastrointestinal hemorrhage type      Endocrine   (+) Morbid obesity (Multi)      Musculoskeletal   (+) Spinal stenosis of lumbar region without neurogenic claudication      ID   (+) C. difficile colitis       Clinical information reviewed:   Tobacco  Allergies  Meds   Med Hx  Surg Hx   Fam Hx  Soc Hx        NPO Detail:  NPO/Void Status  Date of Last Liquid: 05/27/24  Time of Last Liquid: 1800  Date of Last Solid: 05/27/24  Time of Last Solid: 0800         Physical Exam    Airway  Mallampati: III  TM distance: <3 FB  Neck ROM: limited     Cardiovascular   Rhythm: irregular     Dental - normal exam     Pulmonary    Abdominal        Anesthesia Plan    History of general anesthesia?: yes  History of complications of general anesthesia?: no    ASA 4     MAC     The patient is not a current smoker.  Patient was not previously instructed to abstain from smoking on day of procedure.  Patient did not smoke on day of procedure.    Anesthetic plan and risks discussed with patient.  Use of blood products discussed with patient who.    Plan discussed with [facility].

## 2024-05-28 NOTE — DISCHARGE SUMMARY
Discharge Diagnosis  Gastrointestinal hemorrhage, unspecified gastrointestinal hemorrhage type    Issues Requiring Follow-Up  Patient fully evaluated on May 28. EGD today, unremarkable. Xray hip right with pelvis -  No acute fracture or major malalignment. Mild right hip joint osteoarthritic changes. Patient improved and medically cleared for discharge to skilled today. Medications and labs reviewed and continue plan as below.  Discharge Meds     Your medication list        CONTINUE taking these medications        Instructions Last Dose Given Next Dose Due   ammonium lactate 12 % lotion  Commonly known as: Lac-Hydrin           busPIRone 10 mg tablet  Commonly known as: Buspar           clindamycin 150 mg capsule  Commonly known as: Cleocin           Depakote 500 mg EC tablet  Generic drug: divalproex           divalproex 125 mg EC tablet  Commonly known as: Depakote           docusate sodium 100 mg capsule  Commonly known as: Colace           DULoxetine 60 mg DR capsule  Commonly known as: Cymbalta           fluticasone 50 mcg/actuation nasal spray  Commonly known as: Flonase           furosemide 20 mg tablet  Commonly known as: Lasix           HYDROcodone-acetaminophen 5-325 mg tablet  Commonly known as: Norco           hydrocortisone 2.5 % rectal cream  Commonly known as: Anusol-HC           Incruse Ellipta 62.5 mcg/actuation inhalation  Generic drug: umeclidinium           lactobacillus acidophilus capsule           loratadine 10 mg tablet  Commonly known as: Claritin           melatonin 3 mg capsule           melatonin 3 mg tablet           phenylephrine-cocoa butter 0.25-88.44 % suppository  Commonly known as: Hemorrhoidal           rivaroxaban 20 mg tablet  Commonly known as: Xarelto           traZODone 100 mg tablet  Commonly known as: Desyrel           ziprasidone 60 mg capsule  Commonly known as: Geodon                    Test Results Pending At Discharge  Pending Labs       Order Current Status    Surgical  Pathology Exam In process            Hospital Course          Nahun Petty MD   Physician  Internal Medicine     Progress Notes      Signed     Date of Service: 5/27/2024  2:06 PM     Signed       Expand All Collapse All    Gray Kim is a 63 y.o. female on day 0 of admission presenting with Gastrointestinal hemorrhage, unspecified gastrointestinal hemorrhage type.           Subjective   Patient fully evaluated on 827.  Continue present medications recheck labs in AM.  Appreciate GI consultation with endoscopy planned for tomorrow.  Anusol HC for hemorrhoids.              Objective   Last Recorded Vitals  /60   Pulse 96   Temp 36.3 °C (97.3 °F)   Resp 18   Wt 120 kg (263 lb 7.2 oz)   SpO2 91%   Intake/Output last 3 Shifts:     Intake/Output Summary (Last 24 hours) at 5/27/2024 1406  Last data filed at 5/26/2024 2320      Gross per 24 hour   Intake 20 ml   Output --   Net 20 ml         Admission Weight  Weight: 146 kg (321 lb 6.4 oz) (05/26/24 1530)     Daily Weight  05/27/24 : 120 kg (263 lb 7.2 oz)     Image Results  CT abdomen pelvis w IV contrast  Narrative: STUDY:  CT Abdomen and Pelvis with IV Contrast; 5/26/2024 at 5:23 PM.  INDICATION:  Bright red blood per rectum with diffuse abdominal pain, evaluate for  diverticular disease.  COMPARISON:  CT AP 1/23/2021.  ACCESSION NUMBER(S):  [id number]  ORDERING CLINICIAN:  EJ SO  TECHNIQUE:  CT of the abdomen and pelvis was performed.  Contiguous axial images  were obtained at 3 mm slice thickness through the abdomen and pelvis.   Coronal and sagittal reconstructions at 3 mm slice thickness were  performed.  Omnipaque 350 75 mL was administered intravenously.    FINDINGS:  LOWER CHEST:  No cardiomegaly.  No pericardial effusion.  Minimal bibasilar areas of  atelectasis     ABDOMEN:     LIVER:  No hepatomegaly.  Smooth surface contour.  Mild fatty infiltration of  the liver.     BILE DUCTS:  No intrahepatic or extrahepatic biliary ductal  dilatation.     GALLBLADDER:  The gallbladder is absent.  STOMACH:  Moderate gastric wall thickening.  Suspect a gastric ulcer along the  anterior wall of the distal gastric body on axial image 37 measuring  2.4 cm x 1.5 cm.  No evidence for perforation or surrounding  perigastric fat stranding..     PANCREAS:  No masses or ductal dilatation.     SPLEEN:  No splenomegaly or focal splenic lesion.     ADRENAL GLANDS:  No thickening or nodules.     KIDNEYS AND URETERS:  Kidneys are normal in size and location.  No renal or ureteral  calculi.     PELVIS:     BLADDER:  No abnormalities identified.     REPRODUCTIVE ORGANS:  No abnormalities identified.     BOWEL:  No abnormalities identified.  Appendix is normal.     VESSELS:  No abnormalities identified.  Abdominal aorta is normal in caliber.      PERITONEUM/RETROPERITONEUM/LYMPH NODES:  No free fluid.  No pneumoperitoneum.  No lymphadenopathy.     ABDOMINAL WALL:  No abnormalities identified.  SOFT TISSUES:   No abnormalities identified.     BONES:  No acute fracture or aggressive osseous lesion.  Impression: 1. Moderate gastric wall thickening suggestive of gastritis. Suspect a  gastric ulcer along the anterior wall of the distal gastric body on  axial measuring 2.4 cm x 1.5 cm. No evidence for perforation or  surrounding perigastric fat stranding. Recommend further evaluation  with endoscopy.  No other acute process.  No CT findings to account  for the patient's reported bright red blood per rectum.  2. Mild hepatic steatosis.  Signed by Yobani Escobedo MD        Physical Exam     Relevant Results                       Assessment/Plan         Nahun Petty MD  Physician  Internal Medicine     H&P      Addendum     Date of Service: 5/26/2024  8:38 PM      Addendum        Expand All Collapse All    History Of Present Illness  Gray Kim is a 63-year-old female with past medical history of morbid obesity, bipolar II disorder, schizoaffective disorder, insomnia,  COPD, ANN MARIE (not on CPAP), PE on xarelto, lumbar spinal stenosis, cervical radiculopathy, chronic pain, osteoarthritis, paroxysmal atrial fibrillation, SVT, and GERD.  Patient presents from skilled nursing facility with complaints of abdominal pain, bleeding hemorrhoids, and nausea with hematemesis..  She reports symptoms ongoing for about 3 weeks.  She does report rectal pain due to hemorrhoids, has noted red blood on tissue paper with wiping.  States that she has been having frequent loose stools and has been on stool softeners.  Patient reports ongoing issues with nausea and abdominal pain he describes as generalized but worse in the epigastric area.  She had episode of vomiting with small amount of red blood in emesis.  Denies any known black stools or hematochezia.  Reports headache which she attributes to recent dental work, dysuria and frequent urination, on Lasix 3 times daily, bilateral lower extremity edema, chest discomfort in the upper sternum, intermittent cough with yellow sputum production.  Denies fevers, acute vision or hearing changes, palpitations, rapid heart rate, and open wounds     ER course: Tachycardic 108, otherwise hemodynamically stable, afebrile, SpO2 94% on room air. WBC 5.7, hemoglobin 12.3/hematocrit 38.2 (Hgb 12.2 3/9/24, normocytic, normochromic), platelets 298.  BUN 25 otherwise CMP is normal.  Lactate 1.7.  INR 1.0 CT A/P showed moderate gastric wall thickening suggestive of gastritis with suspected gastric ulcer measuring 2.4 cm x 1.5 cm along the anterior wall of the distal gastric body.  No evidence of perforation or surrounding perigastric fat stranding.  Mild hepatic steatosis.     CODE STATUS reviewed with patient, her desire is to be a full code     Past medical history: As above  Past surgical history: I&D lower extremity wounds, ORIF right tibia fracture  Social history: Former long-term smoker, quit approximately 6 months ago smoked 1 pack/day since the age of 26.  Denies  illicit drug use or alcohol abuse.  Resident at a skilled nursing facility.  Family history: Mother-unknown, adopted; father-heart disease     Colonoscopy 5/27/2021     Impression:            - Preparation of the colon was fair.                         - Moderate amount of semi-solid stool in the entire                          examined colon.                         - The examined portion of the terminal ileum was                          normal.                         - Multiple erosions at the splenic flexure.                         - Small non-bleeding external and internal hemorrhoids.                         - Random biopsies were taken with a cold forceps for                          histology in the rectum, in the left colon, at the                          splenic flexure, in the transverse colon, in the right                          colon and in the terminal ileum.     EGD 5/7/2021 (pathology negative for H .pylori)     Impression:            - Normal esophagus.                         - Random biopsies were taken with a cold forceps for                          histology in the mid esophagus and in the distal                          esophagus.                         - Moderately erythematous mucosa in the antrum and                          prepyloric region of the stomach. Biopsied.                         - Normal examined duodenum to D2. Biopsied     Past Medical History  Medical History           Past Medical History:   Diagnosis Date    Bipolar disorder, currently in remission, most recent episode unspecified (Multi) 02/11/2021     History of depressed bipolar disorder            Surgical History  Surgical History   No past surgical history on file.         Social History  She reports that she has quit smoking. Her smoking use included cigarettes. She has never used smokeless tobacco. She reports that she does not currently use alcohol. She reports that she does not currently use drugs.    "  Family History  Family History   No family history on file.         Allergies  Nsaids (non-steroidal anti-inflammatory drug), Aspirin, Penicillins, and Simvastatin     Review of Systems     10 point ROS negative except as noted above in HPI     Physical Exam  Constitutional:       General: She is awake. She is not in acute distress.     Appearance: Normal appearance. She is not toxic-appearing.   HENT:      Head: Atraumatic.      Nose: Nose normal.      Mouth/Throat:      Mouth: Mucous membranes are moist.   Eyes:      Conjunctiva/sclera: Conjunctivae normal.   Cardiovascular:      Rate and Rhythm: Normal rate and regular rhythm.      Pulses: Normal pulses.      Heart sounds: No murmur heard.  Pulmonary:      Effort: Pulmonary effort is normal. No respiratory distress.      Breath sounds: Normal breath sounds.   Abdominal:      General: Bowel sounds are normal. There is distension.      Palpations: There is no mass.      Tenderness: There is abdominal tenderness. There is no guarding or rebound.      Hernia: No hernia is present.   Musculoskeletal:         General: No deformity or signs of injury. Normal range of motion.      Cervical back: Neck supple.      Right lower leg: Edema present.      Left lower leg: Edema present.      Comments: Limited ROM 2/2 body habitus   Skin:     General: Skin is warm and dry.      Capillary Refill: Capillary refill takes less than 2 seconds.      Findings: No ecchymosis, erythema or wound.   Neurological:      General: No focal deficit present.      Mental Status: She is alert and oriented to person, place, and time.      Sensory: No sensory deficit.      Motor: No weakness.   Psychiatric:         Behavior: Behavior is cooperative.            Last Recorded Vitals  Blood pressure 140/83, pulse (!) 113, temperature 37 °C (98.6 °F), temperature source Tympanic, resp. rate 20, height 1.549 m (5' 1\"), weight 146 kg (321 lb 6.4 oz), SpO2 94%.     Relevant Results                "   Results for orders placed or performed during the hospital encounter of 05/26/24 (from the past 24 hour(s))   CBC and Auto Differential   Result Value Ref Range     WBC 5.7 4.4 - 11.3 x10*3/uL     nRBC 0.0 0.0 - 0.0 /100 WBCs     RBC 4.20 4.00 - 5.20 x10*6/uL     Hemoglobin 12.3 12.0 - 16.0 g/dL     Hematocrit 38.2 36.0 - 46.0 %     MCV 91 80 - 100 fL     MCH 29.3 26.0 - 34.0 pg     MCHC 32.2 32.0 - 36.0 g/dL     RDW 13.4 11.5 - 14.5 %     Platelets 298 150 - 450 x10*3/uL     Neutrophils % 63.4 40.0 - 80.0 %     Immature Granulocytes %, Automated 1.8 (H) 0.0 - 0.9 %     Lymphocytes % 18.2 13.0 - 44.0 %     Monocytes % 12.3 2.0 - 10.0 %     Eosinophils % 3.9 0.0 - 6.0 %     Basophils % 0.4 0.0 - 2.0 %     Neutrophils Absolute 3.63 1.20 - 7.70 x10*3/uL     Immature Granulocytes Absolute, Automated 0.10 0.00 - 0.70 x10*3/uL     Lymphocytes Absolute 1.04 (L) 1.20 - 4.80 x10*3/uL     Monocytes Absolute 0.70 0.10 - 1.00 x10*3/uL     Eosinophils Absolute 0.22 0.00 - 0.70 x10*3/uL     Basophils Absolute 0.02 0.00 - 0.10 x10*3/uL   Basic metabolic panel   Result Value Ref Range     Glucose 95 74 - 99 mg/dL     Sodium 138 136 - 145 mmol/L     Potassium 5.0 3.5 - 5.3 mmol/L     Chloride 99 98 - 107 mmol/L     Bicarbonate 31 21 - 32 mmol/L     Anion Gap 13 10 - 20 mmol/L     Urea Nitrogen 25 (H) 6 - 23 mg/dL     Creatinine 0.78 0.50 - 1.05 mg/dL     eGFR 87 >60 mL/min/1.73m*2     Calcium 10.1 8.6 - 10.3 mg/dL   Lactate   Result Value Ref Range     Lactate 1.7 0.4 - 2.0 mmol/L   Hepatic function panel   Result Value Ref Range     Albumin 4.1 3.4 - 5.0 g/dL     Bilirubin, Total 0.5 0.0 - 1.2 mg/dL     Bilirubin, Direct 0.1 0.0 - 0.3 mg/dL     Alkaline Phosphatase 76 33 - 136 U/L     ALT 17 7 - 45 U/L     AST 15 9 - 39 U/L     Total Protein 6.9 6.4 - 8.2 g/dL   Protime-INR   Result Value Ref Range     Protime 11.6 9.8 - 12.8 seconds     INR 1.0 0.9 - 1.1   Type And Screen   Result Value Ref Range     ABO TYPE B       Rh TYPE NEG        ANTIBODY SCREEN NEG        CT abdomen pelvis w IV contrast     Result Date: 5/26/2024  STUDY: CT Abdomen and Pelvis with IV Contrast; 5/26/2024 at 5:23 PM. INDICATION: Bright red blood per rectum with diffuse abdominal pain, evaluate for diverticular disease. COMPARISON: CT AP 1/23/2021. ACCESSION NUMBER(S): [id number] ORDERING CLINICIAN: EJ SO TECHNIQUE: CT of the abdomen and pelvis was performed.  Contiguous axial images were obtained at 3 mm slice thickness through the abdomen and pelvis. Coronal and sagittal reconstructions at 3 mm slice thickness were performed.  Omnipaque 350 75 mL was administered intravenously.  FINDINGS: LOWER CHEST: No cardiomegaly.  No pericardial effusion.  Minimal bibasilar areas of atelectasis  ABDOMEN:  LIVER: No hepatomegaly.  Smooth surface contour.  Mild fatty infiltration of the liver.  BILE DUCTS: No intrahepatic or extrahepatic biliary ductal dilatation.  GALLBLADDER: The gallbladder is absent. STOMACH: Moderate gastric wall thickening.  Suspect a gastric ulcer along the anterior wall of the distal gastric body on axial image 37 measuring 2.4 cm x 1.5 cm.  No evidence for perforation or surrounding perigastric fat stranding..  PANCREAS: No masses or ductal dilatation.  SPLEEN: No splenomegaly or focal splenic lesion.  ADRENAL GLANDS: No thickening or nodules.  KIDNEYS AND URETERS: Kidneys are normal in size and location.  No renal or ureteral calculi.  PELVIS:  BLADDER: No abnormalities identified.  REPRODUCTIVE ORGANS: No abnormalities identified.  BOWEL: No abnormalities identified.  Appendix is normal.  VESSELS: No abnormalities identified.  Abdominal aorta is normal in caliber.  PERITONEUM/RETROPERITONEUM/LYMPH NODES: No free fluid.  No pneumoperitoneum. No lymphadenopathy.  ABDOMINAL WALL: No abnormalities identified. SOFT TISSUES: No abnormalities identified.  BONES: No acute fracture or aggressive osseous lesion.     1. Moderate gastric wall thickening  suggestive of gastritis. Suspect a gastric ulcer along the anterior wall of the distal gastric body on axial measuring 2.4 cm x 1.5 cm. No evidence for perforation or surrounding perigastric fat stranding. Recommend further evaluation with endoscopy.  No other acute process.  No CT findings to account for the patient's reported bright red blood per rectum. 2. Mild hepatic steatosis. Signed by Yobani Escobedo MD            Assessment/Plan   Principal Problem:    Gastrointestinal hemorrhage, unspecified gastrointestinal hemorrhage type     63-year-old female with past medical history of morbid obesity, bipolar II disorder, schizoaffective disorder, insomnia, COPD, ANN MARIE (not on CPAP), PE on xarelto, lumbar spinal stenosis, cervical radiculopathy, chronic pain, osteoarthritis, paroxysmal atrial fibrillation, SVT, and GERD.  Patient presents from skilled nursing facility with complaints of abdominal pain, bleeding hemorrhoids, and nausea with hematemesis.     #Abdominal pain  #Abnormal imaging, concerning for gastric ulcer  #Hematemesis  #Bleeding hemorrhoids  #Diarrhea secondary to stool softeners.  #History of GERD     Consult gastroenterology  IV Protonix 40 mg twice daily  Repeat H&H this evening, stable on admission  Transfuse as needed for hemoglobin less than 7 or if patient is acutely symptomatic  Clear liquid diets tonight, n.p.o. after midnight for procedure  Zofran as needed  Simethicone as needed  Analgesics as needed  Anusol for treatment of hemorrhoids  Will hold stool softeners.  Consider stool studies if patient continues to have diarrhea  Hold Xarelto     #Dysuria  Check UA, follow-up findings -treat as appropriate     #Bilateral lower extremity edema/lymphedema  Will hold Lasix overnight, will resume if no evidence of bleeding  Monitor     Chronic conditions:  #Morbid obesity  #Bipolar II disorder  #Schizoaffective disorder  #COPD  #Insomnia  #ANN MARIE  #History PE  #Chronic back pain  #Lumbar spinal stenosis  cervical radiculopathy  #History of arthritis  #Paroxysmal atrial fibrillation   #History SVT     Continue with patient's home medications as appropriate  Supplemental oxygen as needed     #DVT prophylaxis  Chemoprophylaxis on hold due to possible GI bleed  SCDs  -Follow-up patient fully evaluated and plan as above                        Revision History                      Principal Problem:    Gastrointestinal hemorrhage, unspecified gastrointestinal hemorrhage type                    Nahun Petty MD                            Patient fully evaluated on May 28. EGD today, unremarkable. Xray hip right with pelvis -  No acute fracture or major malalignment. Mild right hip joint osteoarthritic changes. Patient improved and medically cleared for discharge to skilled nursing today. Medications and labs reviewed and continue plan as above.  Pertinent Physical Exam At Time of Discharge  Physical Exam    Outpatient Follow-Up  No future appointments.      Nahun Petty MD

## 2024-05-28 NOTE — CARE PLAN
The patient's goals for the shift include comfort and pain control    The clinical goals for the shift include maintain comfort and safety    Over the shift, the patient did make progress toward the following goals.  Problem: Pain - Adult  Goal: Verbalizes/displays adequate comfort level or baseline comfort level  Outcome: Progressing     Problem: Safety - Adult  Goal: Free from fall injury  Outcome: Progressing     Problem: Discharge Planning  Goal: Discharge to home or other facility with appropriate resources  Outcome: Progressing     Problem: Chronic Conditions and Co-morbidities  Goal: Patient's chronic conditions and co-morbidity symptoms are monitored and maintained or improved  Outcome: Progressing     Problem: Fall/Injury  Goal: Not fall by end of shift  Outcome: Progressing  Goal: Be free from injury by end of the shift  Outcome: Progressing  Goal: Verbalize understanding of personal risk factors for fall in the hospital  Outcome: Progressing  Goal: Verbalize understanding of risk factor reduction measures to prevent injury from fall in the home  Outcome: Progressing  Goal: Use assistive devices by end of the shift  Outcome: Progressing  Goal: Pace activities to prevent fatigue by end of the shift  Outcome: Progressing     Problem: Pain  Goal: Takes deep breaths with improved pain control throughout the shift  Outcome: Progressing  Goal: Turns in bed with improved pain control throughout the shift  Outcome: Progressing  Goal: Walks with improved pain control throughout the shift  Outcome: Progressing  Goal: Performs ADL's with improved pain control throughout shift  Outcome: Progressing  Goal: Participates in PT with improved pain control throughout the shift  Outcome: Progressing  Goal: Free from opioid side effects throughout the shift  Outcome: Progressing  Goal: Free from acute confusion related to pain meds throughout the shift  Outcome: Progressing     Problem: Skin  Goal: Decreased wound  size/increased tissue granulation at next dressing change  Outcome: Progressing  Goal: Participates in plan/prevention/treatment measures  Outcome: Progressing  Goal: Prevent/manage excess moisture  Outcome: Progressing  Goal: Prevent/minimize sheer/friction injuries  Outcome: Progressing  Goal: Promote/optimize nutrition  Outcome: Progressing  Goal: Promote skin healing  Outcome: Progressing

## 2024-05-28 NOTE — ANESTHESIA POSTPROCEDURE EVALUATION
Patient: Gray Kim    Procedure Summary       Date: 05/28/24 Room / Location: [facility]    Anesthesia Start: 1205 Anesthesia Stop: 1230    Procedure: EGD Diagnosis:       Abdominal pain, unspecified abdominal location      Abnormal CT of the abdomen    Scheduled Providers: Jose Gonzalez MD; Torsten Pedraza MD Responsible Provider: Torsten Pedraza MD    Anesthesia Type: MAC ASA Status: 4            Anesthesia Type: MAC    Vitals Value Taken Time   /88 05/28/24 1243   Temp 36.8 °C (98.2 °F) 05/28/24 1230   Pulse 92 05/28/24 1243   Resp 18 05/28/24 1243   SpO2 92 % 05/28/24 1243       Anesthesia Post Evaluation    Patient location during evaluation: PACU  Patient participation: complete - patient participated  Level of consciousness: awake  Pain score: 0  Pain management: adequate  Airway patency: patent  Cardiovascular status: acceptable  Respiratory status: acceptable  Hydration status: acceptable  Postoperative Nausea and Vomiting: none    No notable events documented.

## 2024-05-28 NOTE — DISCHARGE INSTRUCTIONS

## 2024-05-29 LAB
ATRIAL RATE: 107 BPM
P AXIS: 80 DEGREES
P OFFSET: 205 MS
P ONSET: 142 MS
PR INTERVAL: 162 MS
Q ONSET: 223 MS
QRS COUNT: 17 BEATS
QRS DURATION: 72 MS
QT INTERVAL: 350 MS
QTC CALCULATION(BAZETT): 467 MS
QTC FREDERICIA: 424 MS
R AXIS: 90 DEGREES
T AXIS: 86 DEGREES
T OFFSET: 398 MS
VENTRICULAR RATE: 107 BPM

## 2024-06-04 LAB
LABORATORY COMMENT REPORT: NORMAL
PATH REPORT.FINAL DX SPEC: NORMAL
PATH REPORT.GROSS SPEC: NORMAL
PATH REPORT.RELEVANT HX SPEC: NORMAL
PATH REPORT.TOTAL CANCER: NORMAL

## 2024-06-29 ENCOUNTER — APPOINTMENT (OUTPATIENT)
Dept: RADIOLOGY | Facility: HOSPITAL | Age: 64
End: 2024-06-29
Payer: COMMERCIAL

## 2024-06-29 ENCOUNTER — HOSPITAL ENCOUNTER (INPATIENT)
Facility: HOSPITAL | Age: 64
End: 2024-06-29
Attending: STUDENT IN AN ORGANIZED HEALTH CARE EDUCATION/TRAINING PROGRAM | Admitting: INTERNAL MEDICINE
Payer: COMMERCIAL

## 2024-06-29 ENCOUNTER — APPOINTMENT (OUTPATIENT)
Dept: CARDIOLOGY | Facility: HOSPITAL | Age: 64
End: 2024-06-29
Payer: COMMERCIAL

## 2024-06-29 DIAGNOSIS — J44.1 COPD EXACERBATION (MULTI): Primary | ICD-10-CM

## 2024-06-29 LAB
ALBUMIN SERPL BCP-MCNC: 3.9 G/DL (ref 3.4–5)
ALP SERPL-CCNC: 76 U/L (ref 33–136)
ALT SERPL W P-5'-P-CCNC: 13 U/L (ref 7–45)
ANION GAP BLDV CALCULATED.4IONS-SCNC: 10 MMOL/L (ref 10–25)
ANION GAP BLDV CALCULATED.4IONS-SCNC: 8 MMOL/L (ref 10–25)
ANION GAP SERPL CALC-SCNC: 10 MMOL/L (ref 10–20)
APPARATUS: ABNORMAL
AST SERPL W P-5'-P-CCNC: 14 U/L (ref 9–39)
BASE EXCESS BLDV CALC-SCNC: 5.8 MMOL/L (ref -2–3)
BASE EXCESS BLDV CALC-SCNC: 6.1 MMOL/L (ref -2–3)
BASOPHILS # BLD AUTO: 0.03 X10*3/UL (ref 0–0.1)
BASOPHILS NFR BLD AUTO: 0.4 %
BILIRUB SERPL-MCNC: 0.4 MG/DL (ref 0–1.2)
BNP SERPL-MCNC: 38 PG/ML (ref 0–99)
BODY TEMPERATURE: 37 DEGREES CELSIUS
BODY TEMPERATURE: 37 DEGREES CELSIUS
BUN SERPL-MCNC: 33 MG/DL (ref 6–23)
CA-I BLDV-SCNC: 1.32 MMOL/L (ref 1.1–1.33)
CA-I BLDV-SCNC: 1.35 MMOL/L (ref 1.1–1.33)
CALCIUM SERPL-MCNC: 9.8 MG/DL (ref 8.6–10.3)
CARDIAC TROPONIN I PNL SERPL HS: 8 NG/L (ref 0–13)
CARDIAC TROPONIN I PNL SERPL HS: 8 NG/L (ref 0–13)
CHLORIDE BLDV-SCNC: 100 MMOL/L (ref 98–107)
CHLORIDE BLDV-SCNC: 101 MMOL/L (ref 98–107)
CHLORIDE SERPL-SCNC: 101 MMOL/L (ref 98–107)
CO2 SERPL-SCNC: 32 MMOL/L (ref 21–32)
CREAT SERPL-MCNC: 1.18 MG/DL (ref 0.5–1.05)
EGFRCR SERPLBLD CKD-EPI 2021: 53 ML/MIN/1.73M*2
EOSINOPHIL # BLD AUTO: 0.16 X10*3/UL (ref 0–0.7)
EOSINOPHIL NFR BLD AUTO: 2.1 %
ERYTHROCYTE [DISTWIDTH] IN BLOOD BY AUTOMATED COUNT: 14 % (ref 11.5–14.5)
GLUCOSE BLDV-MCNC: 100 MG/DL (ref 74–99)
GLUCOSE BLDV-MCNC: 109 MG/DL (ref 74–99)
GLUCOSE SERPL-MCNC: 103 MG/DL (ref 74–99)
HCO3 BLDV-SCNC: 33.5 MMOL/L (ref 22–26)
HCO3 BLDV-SCNC: 34 MMOL/L (ref 22–26)
HCT VFR BLD AUTO: 37.1 % (ref 36–46)
HCT VFR BLD EST: 33 % (ref 36–46)
HCT VFR BLD EST: 35 % (ref 36–46)
HGB BLD-MCNC: 11.7 G/DL (ref 12–16)
HGB BLDV-MCNC: 11.1 G/DL (ref 12–16)
HGB BLDV-MCNC: 11.5 G/DL (ref 12–16)
IMM GRANULOCYTES # BLD AUTO: 0.17 X10*3/UL (ref 0–0.7)
IMM GRANULOCYTES NFR BLD AUTO: 2.3 % (ref 0–0.9)
INHALED O2 CONCENTRATION: 21 %
INHALED O2 CONCENTRATION: 44 %
INR PPP: 1.2 (ref 0.9–1.1)
LACTATE BLDV-SCNC: 1.6 MMOL/L (ref 0.4–2)
LACTATE BLDV-SCNC: 1.6 MMOL/L (ref 0.4–2)
LYMPHOCYTES # BLD AUTO: 1.35 X10*3/UL (ref 1.2–4.8)
LYMPHOCYTES NFR BLD AUTO: 18.1 %
MAGNESIUM SERPL-MCNC: 1.98 MG/DL (ref 1.6–2.4)
MCH RBC QN AUTO: 28.6 PG (ref 26–34)
MCHC RBC AUTO-ENTMCNC: 31.5 G/DL (ref 32–36)
MCV RBC AUTO: 91 FL (ref 80–100)
MONOCYTES # BLD AUTO: 0.89 X10*3/UL (ref 0.1–1)
MONOCYTES NFR BLD AUTO: 11.9 %
NEUTROPHILS # BLD AUTO: 4.85 X10*3/UL (ref 1.2–7.7)
NEUTROPHILS NFR BLD AUTO: 65.2 %
NRBC BLD-RTO: 0 /100 WBCS (ref 0–0)
OXYHGB MFR BLDV: 33.5 % (ref 45–75)
OXYHGB MFR BLDV: 74.3 % (ref 45–75)
PCO2 BLDV: 65 MM HG (ref 41–51)
PCO2 BLDV: 66 MM HG (ref 41–51)
PH BLDV: 7.32 PH (ref 7.33–7.43)
PH BLDV: 7.32 PH (ref 7.33–7.43)
PLATELET # BLD AUTO: 303 X10*3/UL (ref 150–450)
PO2 BLDV: 29 MM HG (ref 35–45)
PO2 BLDV: 52 MM HG (ref 35–45)
POTASSIUM BLDV-SCNC: 4.4 MMOL/L (ref 3.5–5.3)
POTASSIUM BLDV-SCNC: 4.6 MMOL/L (ref 3.5–5.3)
POTASSIUM SERPL-SCNC: 4.4 MMOL/L (ref 3.5–5.3)
PROT SERPL-MCNC: 7.1 G/DL (ref 6.4–8.2)
PROTHROMBIN TIME: 13.7 SECONDS (ref 9.8–12.8)
RBC # BLD AUTO: 4.09 X10*6/UL (ref 4–5.2)
SAO2 % BLDV: 34 % (ref 45–75)
SAO2 % BLDV: 75 % (ref 45–75)
SARS-COV-2 RNA RESP QL NAA+PROBE: NOT DETECTED
SODIUM BLDV-SCNC: 138 MMOL/L (ref 136–145)
SODIUM BLDV-SCNC: 140 MMOL/L (ref 136–145)
SODIUM SERPL-SCNC: 139 MMOL/L (ref 136–145)
WBC # BLD AUTO: 7.5 X10*3/UL (ref 4.4–11.3)

## 2024-06-29 PROCEDURE — 85610 PROTHROMBIN TIME: CPT | Performed by: STUDENT IN AN ORGANIZED HEALTH CARE EDUCATION/TRAINING PROGRAM

## 2024-06-29 PROCEDURE — 36415 COLL VENOUS BLD VENIPUNCTURE: CPT | Performed by: STUDENT IN AN ORGANIZED HEALTH CARE EDUCATION/TRAINING PROGRAM

## 2024-06-29 PROCEDURE — 2500000002 HC RX 250 W HCPCS SELF ADMINISTERED DRUGS (ALT 637 FOR MEDICARE OP, ALT 636 FOR OP/ED): Performed by: STUDENT IN AN ORGANIZED HEALTH CARE EDUCATION/TRAINING PROGRAM

## 2024-06-29 PROCEDURE — G0378 HOSPITAL OBSERVATION PER HR: HCPCS

## 2024-06-29 PROCEDURE — 71045 X-RAY EXAM CHEST 1 VIEW: CPT | Mod: FOREIGN READ | Performed by: RADIOLOGY

## 2024-06-29 PROCEDURE — 94640 AIRWAY INHALATION TREATMENT: CPT

## 2024-06-29 PROCEDURE — 96365 THER/PROPH/DIAG IV INF INIT: CPT

## 2024-06-29 PROCEDURE — 93005 ELECTROCARDIOGRAM TRACING: CPT

## 2024-06-29 PROCEDURE — 2500000004 HC RX 250 GENERAL PHARMACY W/ HCPCS (ALT 636 FOR OP/ED): Performed by: STUDENT IN AN ORGANIZED HEALTH CARE EDUCATION/TRAINING PROGRAM

## 2024-06-29 PROCEDURE — 84484 ASSAY OF TROPONIN QUANT: CPT | Mod: 91 | Performed by: STUDENT IN AN ORGANIZED HEALTH CARE EDUCATION/TRAINING PROGRAM

## 2024-06-29 PROCEDURE — 83735 ASSAY OF MAGNESIUM: CPT | Performed by: STUDENT IN AN ORGANIZED HEALTH CARE EDUCATION/TRAINING PROGRAM

## 2024-06-29 PROCEDURE — 83880 ASSAY OF NATRIURETIC PEPTIDE: CPT | Performed by: STUDENT IN AN ORGANIZED HEALTH CARE EDUCATION/TRAINING PROGRAM

## 2024-06-29 PROCEDURE — 84132 ASSAY OF SERUM POTASSIUM: CPT | Mod: 91 | Performed by: STUDENT IN AN ORGANIZED HEALTH CARE EDUCATION/TRAINING PROGRAM

## 2024-06-29 PROCEDURE — 71045 X-RAY EXAM CHEST 1 VIEW: CPT

## 2024-06-29 PROCEDURE — 2500000002 HC RX 250 W HCPCS SELF ADMINISTERED DRUGS (ALT 637 FOR MEDICARE OP, ALT 636 FOR OP/ED): Performed by: NURSE PRACTITIONER

## 2024-06-29 PROCEDURE — 84484 ASSAY OF TROPONIN QUANT: CPT | Performed by: STUDENT IN AN ORGANIZED HEALTH CARE EDUCATION/TRAINING PROGRAM

## 2024-06-29 PROCEDURE — 87635 SARS-COV-2 COVID-19 AMP PRB: CPT | Performed by: STUDENT IN AN ORGANIZED HEALTH CARE EDUCATION/TRAINING PROGRAM

## 2024-06-29 PROCEDURE — 85025 COMPLETE CBC W/AUTO DIFF WBC: CPT | Performed by: STUDENT IN AN ORGANIZED HEALTH CARE EDUCATION/TRAINING PROGRAM

## 2024-06-29 PROCEDURE — 2500000004 HC RX 250 GENERAL PHARMACY W/ HCPCS (ALT 636 FOR OP/ED): Performed by: NURSE PRACTITIONER

## 2024-06-29 PROCEDURE — 2500000001 HC RX 250 WO HCPCS SELF ADMINISTERED DRUGS (ALT 637 FOR MEDICARE OP): Performed by: NURSE PRACTITIONER

## 2024-06-29 PROCEDURE — 99285 EMERGENCY DEPT VISIT HI MDM: CPT

## 2024-06-29 PROCEDURE — 96375 TX/PRO/DX INJ NEW DRUG ADDON: CPT

## 2024-06-29 PROCEDURE — 84132 ASSAY OF SERUM POTASSIUM: CPT | Performed by: STUDENT IN AN ORGANIZED HEALTH CARE EDUCATION/TRAINING PROGRAM

## 2024-06-29 PROCEDURE — 96367 TX/PROPH/DG ADDL SEQ IV INF: CPT

## 2024-06-29 RX ORDER — TALC
3 POWDER (GRAM) TOPICAL NIGHTLY
Status: DISPENSED | OUTPATIENT
Start: 2024-06-29

## 2024-06-29 RX ORDER — EZETIMIBE 10 MG/1
10 TABLET ORAL DAILY
Status: DISPENSED | OUTPATIENT
Start: 2024-06-29

## 2024-06-29 RX ORDER — HYDROCODONE BITARTRATE AND ACETAMINOPHEN 5; 325 MG/1; MG/1
1 TABLET ORAL EVERY 6 HOURS PRN
Status: DISPENSED | OUTPATIENT
Start: 2024-06-29

## 2024-06-29 RX ORDER — ALBUTEROL SULFATE 0.83 MG/ML
2.5 SOLUTION RESPIRATORY (INHALATION) EVERY 6 HOURS
Status: DISCONTINUED | OUTPATIENT
Start: 2024-06-29 | End: 2024-06-29

## 2024-06-29 RX ORDER — ALBUTEROL SULFATE 0.83 MG/ML
2.5 SOLUTION RESPIRATORY (INHALATION) EVERY 4 HOURS PRN
Status: DISCONTINUED | OUTPATIENT
Start: 2024-06-29 | End: 2024-06-29

## 2024-06-29 RX ORDER — SODIUM CHLORIDE 9 MG/ML
75 INJECTION, SOLUTION INTRAVENOUS CONTINUOUS
Status: ACTIVE | OUTPATIENT
Start: 2024-06-29 | End: 2024-06-30

## 2024-06-29 RX ORDER — LISINOPRIL 10 MG/1
10 TABLET ORAL DAILY
Status: DISPENSED | OUTPATIENT
Start: 2024-06-29

## 2024-06-29 RX ORDER — MAGNESIUM SULFATE HEPTAHYDRATE 40 MG/ML
2 INJECTION, SOLUTION INTRAVENOUS ONCE
Status: COMPLETED | OUTPATIENT
Start: 2024-06-29 | End: 2024-06-29

## 2024-06-29 RX ORDER — ONDANSETRON 4 MG/1
4 TABLET, FILM COATED ORAL EVERY 6 HOURS PRN
Status: ON HOLD | COMMUNITY

## 2024-06-29 RX ORDER — DULOXETIN HYDROCHLORIDE 30 MG/1
60 CAPSULE, DELAYED RELEASE ORAL EVERY MORNING
Status: DISPENSED | OUTPATIENT
Start: 2024-06-30

## 2024-06-29 RX ORDER — ALBUTEROL SULFATE 0.83 MG/ML
2.5 SOLUTION RESPIRATORY (INHALATION) EVERY 6 HOURS PRN
Status: ON HOLD | COMMUNITY

## 2024-06-29 RX ORDER — CLONIDINE HYDROCHLORIDE 0.1 MG/1
0.1 TABLET ORAL EVERY 12 HOURS PRN
Status: ON HOLD | COMMUNITY

## 2024-06-29 RX ORDER — TIOTROPIUM BROMIDE INHALATION SPRAY 3.12 UG/1
2 SPRAY, METERED RESPIRATORY (INHALATION) DAILY
Status: ON HOLD | COMMUNITY

## 2024-06-29 RX ORDER — HYDROCORTISONE ACETATE 25 MG/1
25 SUPPOSITORY RECTAL 2 TIMES DAILY
Status: DISPENSED | OUTPATIENT
Start: 2024-06-29

## 2024-06-29 RX ORDER — POLYETHYLENE GLYCOL 3350 17 G/17G
17 POWDER, FOR SOLUTION ORAL 2 TIMES DAILY
Status: DISPENSED | OUTPATIENT
Start: 2024-06-29

## 2024-06-29 RX ORDER — IPRATROPIUM BROMIDE AND ALBUTEROL SULFATE 2.5; .5 MG/3ML; MG/3ML
3 SOLUTION RESPIRATORY (INHALATION) ONCE
Status: COMPLETED | OUTPATIENT
Start: 2024-06-29 | End: 2024-06-29

## 2024-06-29 RX ORDER — L. ACIDOPHILUS/L.BULGARICUS 1MM CELL
1 TABLET ORAL DAILY
Status: DISPENSED | OUTPATIENT
Start: 2024-06-29

## 2024-06-29 RX ORDER — SIMETHICONE 80 MG
80 TABLET,CHEWABLE ORAL 4 TIMES DAILY
Status: DISPENSED | OUTPATIENT
Start: 2024-06-29

## 2024-06-29 RX ORDER — PANTOPRAZOLE SODIUM 40 MG/1
40 TABLET, DELAYED RELEASE ORAL 2 TIMES DAILY
Status: ON HOLD | COMMUNITY

## 2024-06-29 RX ORDER — LISINOPRIL 10 MG/1
10 TABLET ORAL DAILY
Status: ON HOLD | COMMUNITY

## 2024-06-29 RX ORDER — BUSPIRONE HYDROCHLORIDE 10 MG/1
10 TABLET ORAL 3 TIMES DAILY
Status: DISPENSED | OUTPATIENT
Start: 2024-06-29

## 2024-06-29 RX ORDER — FUROSEMIDE 20 MG/1
20 TABLET ORAL 3 TIMES DAILY
Status: DISPENSED | OUTPATIENT
Start: 2024-06-29

## 2024-06-29 RX ORDER — DIVALPROEX SODIUM 250 MG/1
1000 TABLET, DELAYED RELEASE ORAL EVERY 12 HOURS SCHEDULED
Status: DISPENSED | OUTPATIENT
Start: 2024-06-29

## 2024-06-29 RX ORDER — PANTOPRAZOLE SODIUM 40 MG/1
40 TABLET, DELAYED RELEASE ORAL 2 TIMES DAILY
Status: DISPENSED | OUTPATIENT
Start: 2024-06-29

## 2024-06-29 RX ORDER — FLUTICASONE PROPIONATE 50 MCG
2 SPRAY, SUSPENSION (ML) NASAL DAILY
Status: DISPENSED | OUTPATIENT
Start: 2024-06-29

## 2024-06-29 RX ORDER — EZETIMIBE 10 MG/1
10 TABLET ORAL DAILY
Status: ON HOLD | COMMUNITY

## 2024-06-29 RX ORDER — IPRATROPIUM BROMIDE AND ALBUTEROL SULFATE 2.5; .5 MG/3ML; MG/3ML
3 SOLUTION RESPIRATORY (INHALATION)
Status: DISCONTINUED | OUTPATIENT
Start: 2024-06-29 | End: 2024-06-29

## 2024-06-29 RX ORDER — CEFTRIAXONE 1 G/50ML
1 INJECTION, SOLUTION INTRAVENOUS ONCE
Status: COMPLETED | OUTPATIENT
Start: 2024-06-29 | End: 2024-06-29

## 2024-06-29 RX ORDER — ALBUTEROL SULFATE 0.83 MG/ML
2.5 SOLUTION RESPIRATORY (INHALATION)
Status: DISPENSED | OUTPATIENT
Start: 2024-06-30

## 2024-06-29 RX ORDER — HYDROXYZINE PAMOATE 25 MG/1
25 CAPSULE ORAL 4 TIMES DAILY PRN
Status: ON HOLD | COMMUNITY

## 2024-06-29 RX ORDER — ALBUTEROL SULFATE 0.83 MG/ML
2.5 SOLUTION RESPIRATORY (INHALATION) EVERY 2 HOUR PRN
Status: DISPENSED | OUTPATIENT
Start: 2024-06-29

## 2024-06-29 RX ORDER — TRAZODONE HYDROCHLORIDE 50 MG/1
200 TABLET ORAL NIGHTLY
Status: DISPENSED | OUTPATIENT
Start: 2024-06-29

## 2024-06-29 RX ORDER — LORATADINE 10 MG/1
10 TABLET ORAL DAILY
Status: DISPENSED | OUTPATIENT
Start: 2024-06-29

## 2024-06-29 RX ORDER — ACETAMINOPHEN 325 MG/1
650 TABLET ORAL EVERY 8 HOURS PRN
Status: DISPENSED | OUTPATIENT
Start: 2024-06-29

## 2024-06-29 SDOH — SOCIAL STABILITY: SOCIAL INSECURITY: WERE YOU ABLE TO COMPLETE ALL THE BEHAVIORAL HEALTH SCREENINGS?: YES

## 2024-06-29 SDOH — SOCIAL STABILITY: SOCIAL INSECURITY: DO YOU FEEL ANYONE HAS EXPLOITED OR TAKEN ADVANTAGE OF YOU FINANCIALLY OR OF YOUR PERSONAL PROPERTY?: NO

## 2024-06-29 SDOH — ECONOMIC STABILITY: HOUSING INSECURITY: IN THE LAST 12 MONTHS, HOW MANY PLACES HAVE YOU LIVED?: 1

## 2024-06-29 SDOH — ECONOMIC STABILITY: TRANSPORTATION INSECURITY
IN THE PAST 12 MONTHS, HAS LACK OF TRANSPORTATION KEPT YOU FROM MEETINGS, WORK, OR FROM GETTING THINGS NEEDED FOR DAILY LIVING?: NO

## 2024-06-29 SDOH — ECONOMIC STABILITY: INCOME INSECURITY: HOW HARD IS IT FOR YOU TO PAY FOR THE VERY BASICS LIKE FOOD, HOUSING, MEDICAL CARE, AND HEATING?: NOT VERY HARD

## 2024-06-29 SDOH — SOCIAL STABILITY: SOCIAL INSECURITY: HAS ANYONE EVER THREATENED TO HURT YOUR FAMILY OR YOUR PETS?: NO

## 2024-06-29 SDOH — ECONOMIC STABILITY: HOUSING INSECURITY
IN THE LAST 12 MONTHS, WAS THERE A TIME WHEN YOU DID NOT HAVE A STEADY PLACE TO SLEEP OR SLEPT IN A SHELTER (INCLUDING NOW)?: NO

## 2024-06-29 SDOH — ECONOMIC STABILITY: TRANSPORTATION INSECURITY
IN THE PAST 12 MONTHS, HAS THE LACK OF TRANSPORTATION KEPT YOU FROM MEDICAL APPOINTMENTS OR FROM GETTING MEDICATIONS?: NO

## 2024-06-29 SDOH — SOCIAL STABILITY: SOCIAL INSECURITY: DOES ANYONE TRY TO KEEP YOU FROM HAVING/CONTACTING OTHER FRIENDS OR DOING THINGS OUTSIDE YOUR HOME?: NO

## 2024-06-29 SDOH — SOCIAL STABILITY: SOCIAL INSECURITY: HAVE YOU HAD THOUGHTS OF HARMING ANYONE ELSE?: NO

## 2024-06-29 SDOH — SOCIAL STABILITY: SOCIAL INSECURITY: DO YOU FEEL UNSAFE GOING BACK TO THE PLACE WHERE YOU ARE LIVING?: NO

## 2024-06-29 SDOH — ECONOMIC STABILITY: INCOME INSECURITY: IN THE LAST 12 MONTHS, WAS THERE A TIME WHEN YOU WERE NOT ABLE TO PAY THE MORTGAGE OR RENT ON TIME?: NO

## 2024-06-29 SDOH — SOCIAL STABILITY: SOCIAL INSECURITY: HAVE YOU HAD ANY THOUGHTS OF HARMING ANYONE ELSE?: NO

## 2024-06-29 SDOH — SOCIAL STABILITY: SOCIAL INSECURITY: ARE YOU OR HAVE YOU BEEN THREATENED OR ABUSED PHYSICALLY, EMOTIONALLY, OR SEXUALLY BY ANYONE?: NO

## 2024-06-29 SDOH — SOCIAL STABILITY: SOCIAL INSECURITY: ABUSE: ADULT

## 2024-06-29 SDOH — SOCIAL STABILITY: SOCIAL INSECURITY: ARE THERE ANY APPARENT SIGNS OF INJURIES/BEHAVIORS THAT COULD BE RELATED TO ABUSE/NEGLECT?: NO

## 2024-06-29 ASSESSMENT — ACTIVITIES OF DAILY LIVING (ADL)
HEARING - LEFT EAR: FUNCTIONAL
DRESSING YOURSELF: NEEDS ASSISTANCE
PATIENT'S MEMORY ADEQUATE TO SAFELY COMPLETE DAILY ACTIVITIES?: YES
JUDGMENT_ADEQUATE_SAFELY_COMPLETE_DAILY_ACTIVITIES: YES
WALKS IN HOME: NEEDS ASSISTANCE
ADEQUATE_TO_COMPLETE_ADL: YES
FEEDING YOURSELF: INDEPENDENT
HEARING - RIGHT EAR: FUNCTIONAL
TOILETING: NEEDS ASSISTANCE
ASSISTIVE_DEVICE: WALKER;WHEELCHAIR
BATHING: NEEDS ASSISTANCE
GROOMING: NEEDS ASSISTANCE
LACK_OF_TRANSPORTATION: NO

## 2024-06-29 ASSESSMENT — COGNITIVE AND FUNCTIONAL STATUS - GENERAL
MOVING TO AND FROM BED TO CHAIR: A LITTLE
DAILY ACTIVITIY SCORE: 18
TOILETING: A LITTLE
DAILY ACTIVITIY SCORE: 18
TURNING FROM BACK TO SIDE WHILE IN FLAT BAD: A LITTLE
TOILETING: A LITTLE
MOVING FROM LYING ON BACK TO SITTING ON SIDE OF FLAT BED WITH BEDRAILS: A LITTLE
TURNING FROM BACK TO SIDE WHILE IN FLAT BAD: A LITTLE
MOVING FROM LYING ON BACK TO SITTING ON SIDE OF FLAT BED WITH BEDRAILS: A LITTLE
HELP NEEDED FOR BATHING: A LITTLE
CLIMB 3 TO 5 STEPS WITH RAILING: A LOT
MOBILITY SCORE: 16
STANDING UP FROM CHAIR USING ARMS: A LITTLE
DRESSING REGULAR LOWER BODY CLOTHING: A LOT
PERSONAL GROOMING: A LITTLE
STANDING UP FROM CHAIR USING ARMS: A LITTLE
HELP NEEDED FOR BATHING: A LITTLE
WALKING IN HOSPITAL ROOM: A LOT
PERSONAL GROOMING: A LITTLE
WALKING IN HOSPITAL ROOM: A LOT
MOVING TO AND FROM BED TO CHAIR: A LITTLE
PATIENT BASELINE BEDBOUND: NO
MOBILITY SCORE: 16
DRESSING REGULAR UPPER BODY CLOTHING: A LITTLE
CLIMB 3 TO 5 STEPS WITH RAILING: A LOT
DRESSING REGULAR UPPER BODY CLOTHING: A LITTLE
DRESSING REGULAR LOWER BODY CLOTHING: A LOT

## 2024-06-29 ASSESSMENT — LIFESTYLE VARIABLES
AUDIT-C TOTAL SCORE: 0
EVER HAD A DRINK FIRST THING IN THE MORNING TO STEADY YOUR NERVES TO GET RID OF A HANGOVER: NO
EVER FELT BAD OR GUILTY ABOUT YOUR DRINKING: NO
AUDIT-C TOTAL SCORE: 0
PRESCIPTION_ABUSE_PAST_12_MONTHS: NO
HOW MANY STANDARD DRINKS CONTAINING ALCOHOL DO YOU HAVE ON A TYPICAL DAY: PATIENT DOES NOT DRINK
HAVE YOU EVER FELT YOU SHOULD CUT DOWN ON YOUR DRINKING: NO
TOTAL SCORE: 0
HOW OFTEN DO YOU HAVE 6 OR MORE DRINKS ON ONE OCCASION: NEVER
SKIP TO QUESTIONS 9-10: 1
SUBSTANCE_ABUSE_PAST_12_MONTHS: NO
HAVE PEOPLE ANNOYED YOU BY CRITICIZING YOUR DRINKING: NO
HOW OFTEN DO YOU HAVE A DRINK CONTAINING ALCOHOL: NEVER

## 2024-06-29 ASSESSMENT — PAIN SCALES - GENERAL
PAINLEVEL_OUTOF10: 4
PAINLEVEL_OUTOF10: 10 - WORST POSSIBLE PAIN
PAINLEVEL_OUTOF10: 0 - NO PAIN
PAINLEVEL_OUTOF10: 10 - WORST POSSIBLE PAIN
PAINLEVEL_OUTOF10: 10 - WORST POSSIBLE PAIN

## 2024-06-29 ASSESSMENT — PATIENT HEALTH QUESTIONNAIRE - PHQ9
1. LITTLE INTEREST OR PLEASURE IN DOING THINGS: NOT AT ALL
SUM OF ALL RESPONSES TO PHQ9 QUESTIONS 1 & 2: 0
2. FEELING DOWN, DEPRESSED OR HOPELESS: NOT AT ALL

## 2024-06-29 ASSESSMENT — PAIN - FUNCTIONAL ASSESSMENT: PAIN_FUNCTIONAL_ASSESSMENT: 0-10

## 2024-06-29 ASSESSMENT — PAIN DESCRIPTION - LOCATION: LOCATION: GENERALIZED

## 2024-06-29 NOTE — H&P
History Of Present Illness  Gray Kim is a 63 y.o. female with past medical history of morbid obesity, bipolar II disorder, schizoaffective disorder, insomnia, COPD, ANN MARIE (not on CPAP), PE on xarelto, lumbar spinal stenosis, cervical radiculopathy, chronic pain, osteoarthritis, paroxysmal atrial fibrillation, SVT, and GERD.  Patient presents to ER for evaluation of chest pain.  Patient reports she has not been feeling well over the last couple weeks.  She reports yesterday she developed left-sided chest pain without radiation prompting her to the emergency room for evaluation.  She states she has been telling nursing at her facility that she has not been feeling well however no one has listened to her.  She reports shortness of breath, denies any fevers or chills.  She is reporting a nonproductive cough.  She reports at her last discharge she was sent home on oxygen which was discontinued this previous Tuesday.  She denies any abdominal pain, nausea, vomiting.  She reports she is feeling better since arrival to the ER.  She denies any current chest pain.    ED course: Afebrile, hemodynamically stable.  Hypoxic at 86% on room air improved to 96% on 3 L.  Labs significant for creatinine of 1.18 (previously 0.65 5/2024), chronic stable anemia.  Troponin negative x 2.  BNP 38.  CXR negative for acute findings.  Azithromycin, Rocephin, Solu-Medrol, magnesium in ER.    10 point review of systems has been performed and is negative except for what is stated above    Patient has been admitted to the care of Dr. Petty for further medical management.     Past Medical History  Past Medical History:   Diagnosis Date    Bipolar disorder, currently in remission, most recent episode unspecified (Multi) 02/11/2021    History of depressed bipolar disorder       Surgical History  Past Surgical History:   Procedure Laterality Date    CHOLECYSTECTOMY          Social History  She reports that she has quit smoking. Her smoking  "use included cigarettes. She has never used smokeless tobacco. She reports that she does not currently use alcohol. She reports that she does not currently use drugs.    Family History  No family history on file.     Allergies  Nsaids (non-steroidal anti-inflammatory drug), Aspirin, Penicillins, and Simvastatin       Physical Exam  Constitutional:       Appearance: She is obese.   HENT:      Head: Normocephalic.      Nose: Nose normal.      Mouth/Throat:      Mouth: Mucous membranes are moist.   Eyes:      Extraocular Movements: Extraocular movements intact.      Conjunctiva/sclera: Conjunctivae normal.   Cardiovascular:      Rate and Rhythm: Normal rate and regular rhythm.      Heart sounds: Normal heart sounds.   Pulmonary:      Effort: Pulmonary effort is normal.      Comments: Lungs diminished throughout, faint expiratory wheeze   Abdominal:      General: Bowel sounds are normal.      Palpations: Abdomen is soft.   Musculoskeletal:      Cervical back: Normal range of motion.      Comments: BLE chronic edema   Skin:     Comments: Bilateral lower extremity chronic discoloration   Neurological:      Mental Status: She is alert.          Last Recorded Vitals  Blood pressure 138/77, pulse (!) 112, temperature 36 °C (96.8 °F), resp. rate (!) 30, height 1.549 m (5' 1\"), weight 117 kg (258 lb), SpO2 (!) 93%.    Relevant Results  Scheduled medications  azithromycin, 500 mg, intravenous, Once      Continuous medications     PRN medications    XR chest 1 view    Result Date: 6/29/2024  STUDY: Chest Radiograph;  6/29/2024 6:05 AM INDICATION: Chest pain. COMPARISON: CXR 3/9/2024. ACCESSION NUMBER(S): [id number] ORDERING CLINICIAN: CAITLIN CHAVEZ TECHNIQUE:  Frontal chest was obtained at 05:55 hours. FINDINGS: CARDIOMEDIASTINAL SILHOUETTE: Cardiomediastinal silhouette is normal in size and configuration.  LUNGS: Lungs are clear. Mild bibasilar atelectasis noted, however. There is no pneumothorax.  ABDOMEN: No remarkable " upper abdominal findings.  BONES: No acute osseous changes. Deformity of the left humeral surgical neck likely chronic fracture.    No acute abnormalities Signed by Devon Morocho MD     Results for orders placed or performed during the hospital encounter of 06/29/24 (from the past 24 hour(s))   CBC and Auto Differential   Result Value Ref Range    WBC 7.5 4.4 - 11.3 x10*3/uL    nRBC 0.0 0.0 - 0.0 /100 WBCs    RBC 4.09 4.00 - 5.20 x10*6/uL    Hemoglobin 11.7 (L) 12.0 - 16.0 g/dL    Hematocrit 37.1 36.0 - 46.0 %    MCV 91 80 - 100 fL    MCH 28.6 26.0 - 34.0 pg    MCHC 31.5 (L) 32.0 - 36.0 g/dL    RDW 14.0 11.5 - 14.5 %    Platelets 303 150 - 450 x10*3/uL    Neutrophils % 65.2 40.0 - 80.0 %    Immature Granulocytes %, Automated 2.3 (H) 0.0 - 0.9 %    Lymphocytes % 18.1 13.0 - 44.0 %    Monocytes % 11.9 2.0 - 10.0 %    Eosinophils % 2.1 0.0 - 6.0 %    Basophils % 0.4 0.0 - 2.0 %    Neutrophils Absolute 4.85 1.20 - 7.70 x10*3/uL    Immature Granulocytes Absolute, Automated 0.17 0.00 - 0.70 x10*3/uL    Lymphocytes Absolute 1.35 1.20 - 4.80 x10*3/uL    Monocytes Absolute 0.89 0.10 - 1.00 x10*3/uL    Eosinophils Absolute 0.16 0.00 - 0.70 x10*3/uL    Basophils Absolute 0.03 0.00 - 0.10 x10*3/uL   Comprehensive Metabolic Panel   Result Value Ref Range    Glucose 103 (H) 74 - 99 mg/dL    Sodium 139 136 - 145 mmol/L    Potassium 4.4 3.5 - 5.3 mmol/L    Chloride 101 98 - 107 mmol/L    Bicarbonate 32 21 - 32 mmol/L    Anion Gap 10 10 - 20 mmol/L    Urea Nitrogen 33 (H) 6 - 23 mg/dL    Creatinine 1.18 (H) 0.50 - 1.05 mg/dL    eGFR 53 (L) >60 mL/min/1.73m*2    Calcium 9.8 8.6 - 10.3 mg/dL    Albumin 3.9 3.4 - 5.0 g/dL    Alkaline Phosphatase 76 33 - 136 U/L    Total Protein 7.1 6.4 - 8.2 g/dL    AST 14 9 - 39 U/L    Bilirubin, Total 0.4 0.0 - 1.2 mg/dL    ALT 13 7 - 45 U/L   Magnesium   Result Value Ref Range    Magnesium 1.98 1.60 - 2.40 mg/dL   Troponin I, High Sensitivity, Initial   Result Value Ref Range    Troponin I, High  Sensitivity 8 0 - 13 ng/L   BLOOD GAS VENOUS FULL PANEL   Result Value Ref Range    POCT pH, Venous 7.32 (L) 7.33 - 7.43 pH    POCT pCO2, Venous 66 (H) 41 - 51 mm Hg    POCT pO2, Venous 29 (L) 35 - 45 mm Hg    POCT SO2, Venous 34 (L) 45 - 75 %    POCT Oxy Hemoglobin, Venous 33.5 (L) 45.0 - 75.0 %    POCT Hematocrit Calculated, Venous 35.0 (L) 36.0 - 46.0 %    POCT Sodium, Venous 140 136 - 145 mmol/L    POCT Potassium, Venous 4.4 3.5 - 5.3 mmol/L    POCT Chloride, Venous 100 98 - 107 mmol/L    POCT Ionized Calicum, Venous 1.35 (H) 1.10 - 1.33 mmol/L    POCT Glucose, Venous 109 (H) 74 - 99 mg/dL    POCT Lactate, Venous 1.6 0.4 - 2.0 mmol/L    POCT Base Excess, Venous 6.1 (H) -2.0 - 3.0 mmol/L    POCT HCO3 Calculated, Venous 34.0 (H) 22.0 - 26.0 mmol/L    POCT Hemoglobin, Venous 11.5 (L) 12.0 - 16.0 g/dL    POCT Anion Gap, Venous 10.0 10.0 - 25.0 mmol/L    Patient Temperature 37.0 degrees Celsius    FiO2 44 %    Apparatus CANNULA    Troponin, High Sensitivity, 1 Hour   Result Value Ref Range    Troponin I, High Sensitivity 8 0 - 13 ng/L   B-type natriuretic peptide   Result Value Ref Range    BNP 38 0 - 99 pg/mL   Sars-CoV-2 PCR   Result Value Ref Range    Coronavirus 2019, PCR Not Detected Not Detected   Protime-INR   Result Value Ref Range    Protime 13.7 (H) 9.8 - 12.8 seconds    INR 1.2 (H) 0.9 - 1.1   BLOOD GAS VENOUS FULL PANEL   Result Value Ref Range    POCT pH, Venous 7.32 (L) 7.33 - 7.43 pH    POCT pCO2, Venous 65 (H) 41 - 51 mm Hg    POCT pO2, Venous 52 (H) 35 - 45 mm Hg    POCT SO2, Venous 75 45 - 75 %    POCT Oxy Hemoglobin, Venous 74.3 45.0 - 75.0 %    POCT Hematocrit Calculated, Venous 33.0 (L) 36.0 - 46.0 %    POCT Sodium, Venous 138 136 - 145 mmol/L    POCT Potassium, Venous 4.6 3.5 - 5.3 mmol/L    POCT Chloride, Venous 101 98 - 107 mmol/L    POCT Ionized Calicum, Venous 1.32 1.10 - 1.33 mmol/L    POCT Glucose, Venous 100 (H) 74 - 99 mg/dL    POCT Lactate, Venous 1.6 0.4 - 2.0 mmol/L    POCT Base  Excess, Venous 5.8 (H) -2.0 - 3.0 mmol/L    POCT HCO3 Calculated, Venous 33.5 (H) 22.0 - 26.0 mmol/L    POCT Hemoglobin, Venous 11.1 (L) 12.0 - 16.0 g/dL    POCT Anion Gap, Venous 8.0 (L) 10.0 - 25.0 mmol/L    Patient Temperature 37.0 degrees Celsius    FiO2 21 %                Assessment/Plan   Principal Problem:    COPD exacerbation (Multi)      Gray Kim is a 63 y.o. female with past medical history of morbid obesity, bipolar II disorder, schizoaffective disorder, insomnia, COPD, ANN MARIE (not on CPAP), PE on xarelto, lumbar spinal stenosis, cervical radiculopathy, chronic pain, osteoarthritis, paroxysmal atrial fibrillation, SVT, and GERD.  Patient presents to ER for evaluation of chest pain.  Patient reports she has not been feeling well over the last couple weeks.  She reports yesterday she developed left-sided chest pain without radiation prompting her to the emergency room for evaluation.  She states she has been telling nursing at her facility that she has not been feeling well however no one has listened to her.  She reports shortness of breath, denies any fevers or chills.  She is reporting a nonproductive cough.  She reports at her last discharge she was sent home on oxygen which was discontinued this previous Tuesday.  She denies any abdominal pain, nausea, vomiting.  She reports she is feeling better since arrival to the ER.  She denies any current chest pain.    #COPD exacerbation  #Acute hypoxic respiratory failure 2/2 above  #Mild JASIEL  #Chest pain suspect 2/2 cough/COPD exacerbation-negative troponins  #ANN MARIE-no CPAP  Admit to telemetry  Defer consult to attending  Continue Solu-Medrol  Supplemental O2 to keep sats above 90%  Change azithromycin to Doxycycline 2/2 possible QTc prolongation interaction with home meds  CXR results as above  Repeat labs in a.m.  Normal saline at 75 cc/h stop after 1 L  Scheduled DuoNebs, as needed albuterol  Continue home meds as appropriate once med rec  complete    #Bipolar  #Schizoaffective disorder  #Paroxysmal atrial fibrillation  Awaiting med rec completion, continue home meds as appropriate    #DVT ppx  SCDs  Continue home Xarelto once med rec complete         I spent 45  minutes in the professional and overall care of this patient.  Patient fully evaluated and plan as above    Halley Elizondo, APRN-CNP

## 2024-06-29 NOTE — PROGRESS NOTES
Patient signed out to me pending repeat VBG and likely admission for COPD exacerbation with new oxygen requirement.    On my evaluation, patient states that she feels much improved after breathing treatments.  She is breathing comfortably and new oxygen requirement.  She is able to speak in full sentences.  Lung sounds are diminished bilaterally.    VBG without significant change, although no significant acidosis in the setting of this being a VBG.  Will escalate care to hospitalization for further management.    Discussed with Dr. Petty.

## 2024-06-29 NOTE — ED TRIAGE NOTES
BIBA from facility after complain of chest pain per EMS gave nitroglycerin sublingual and has a little relief. At room air pt o2 sat on the 80's went up to 95% at 3lpm o2. PMH of anxiety and cardiac dse.

## 2024-06-29 NOTE — PROGRESS NOTES
Pharmacy Medication History Review    Gray Kim is a 63 y.o. female admitted for COPD exacerbation (Multi). Pharmacy reviewed the patient's [id number] medications and allergies for accuracy.    The list below reflectives the updated PTA list. Please review each medication in order reconciliation for additional clarification and justification.  Prior to Admission medications    Medication Sig Start Date End Date Taking? Authorizing Provider   acetaminophen (Tylenol) 325 mg tablet Take 2 tablets (650 mg) by mouth every 8 hours if needed for mild pain (1 - 3). 5/28/24   Nahun Petty MD   albuterol 2.5 mg /3 mL (0.083 %) nebulizer solution Take 3 mL (2.5 mg) by nebulization every 6 hours if needed for shortness of breath.    Historical Provider, MD   busPIRone (Buspar) 10 mg tablet Take 1 tablet (10 mg) by mouth 3 times a day. 5/17/24   Historical Provider, MD   cloNIDine (Catapres) 0.1 mg tablet Take 1 tablet (0.1 mg) by mouth every 12 hours if needed for high blood pressure.    Historical Provider, MD   divalproex (Depakote) 125 mg EC tablet Take 1 tablet (125 mg) by mouth 2 times a day. With two 500mg tabs 3/11/24   Historical Provider, MD   divalproex (Depakote) 500 mg EC tablet Take 2 tablets (1,000 mg) by mouth twice a day. With 125mg tab    Historical Provider, MD   DULoxetine (Cymbalta) 60 mg DR capsule Take 1 capsule (60 mg) by mouth once daily in the morning. 5/18/24   Historical Provider, MD   ezetimibe (Zetia) 10 mg tablet Take 1 tablet (10 mg) by mouth once daily.    Historical Provider, MD   fluticasone (Flonase) 50 mcg/actuation nasal spray Administer 2 sprays into each nostril once daily. 11/23/20   Historical Provider, MD   furosemide (Lasix) 20 mg tablet Take 1 tablet (20 mg) by mouth 3 times a day.    Historical Provider, MD   HYDROcodone-acetaminophen (Norco) 5-325 mg tablet Take 1 tablet by mouth every 6 hours if needed for severe pain (7 - 10).    Historical Provider, MD    hydrocortisone (Anusol-HC) 25 mg suppository Insert 1 suppository (25 mg) into the rectum 2 times a day. 5/28/24   Nahun Petty MD   hydrOXYzine pamoate (Vistaril) 25 mg capsule Take 1 capsule (25 mg) by mouth 4 times a day as needed for anxiety.    Historical Provider, MD   Incruse Ellipta 62.5 mcg/actuation inhalation Inhale 1 puff (62.5 mcg) once daily. 3/4/24   Historical Provider, MD   Lactobacillus acidophilus capsule Take 1 capsule by mouth once daily. 5/17/24   Historical Provider, MD   lisinopril 10 mg tablet Take 1 tablet (10 mg) by mouth once daily. Hold for SBP<110, HR<55    Historical Provider, MD   loratadine (Claritin) 10 mg tablet Take 1 tablet (10 mg) by mouth once daily.    Historical Provider, MD   melatonin 3 mg tablet Take 1 tablet (3 mg) by mouth once daily at bedtime.    Historical Provider, MD   ondansetron (Zofran) 4 mg tablet Take 1 tablet (4 mg) by mouth every 6 hours if needed for nausea.    Historical Provider, MD   pantoprazole (ProtoNix) 40 mg EC tablet Take 1 tablet (40 mg) by mouth 2 times a day. Do not crush, chew, or split.    Historical Provider, MD   phenylephrine-cocoa butter (Hemorrhoidal) 0.25-88.44 % suppository Insert 1 suppository into the rectum once daily at bedtime.    Historical Provider, MD   polyethylene glycol (Glycolax, Miralax) 17 gram packet Take 17 g by mouth 2 times a day. 5/28/24   Nahun Petty MD   rivaroxaban (Xarelto) 20 mg tablet Take 1 tablet (20 mg) by mouth once daily in the evening. Take with meals. Hold from 05/17/2024 to 05/20/2024 11/12/22   Historical Provider, MD   simethicone (Mylicon) 80 mg chewable tablet Chew 1 tablet (80 mg) 4 times a day. 5/28/24   Nahun Petty MD   tiotropium (Spiriva Respimat) 2.5 mcg/actuation inhaler Inhale 2 puffs once daily.    Historical Provider, MD   traZODone (Desyrel) 100 mg tablet Take 2 tablets (200 mg) by mouth once daily at bedtime.    Historical Provider, MD   ziprasidone (Geodon) 60 mg capsule  Take 1 capsule (60 mg) by mouth 2 times daily (morning and late afternoon). 3/11/24   Historical Provider, MD        The list below reflectives the updated allergy list. Please review each documented allergy for additional clarification and justification.  Allergies  Reviewed by Alba Boss RN on 6/29/2024        Severity Reactions Comments    Cat Dander Low Itching     Grass Pollen Low Itching     Nsaids (non-steroidal Anti-inflammatory Drug) Low GI Upset     Penicillins Low Hives, Rash     Ragweed Low Itching     Simvastatin Low Hives, GI Upset             Below are additional concerns with the patient's PTA list.      Kendy Whitley

## 2024-06-29 NOTE — CARE PLAN
The patient's goals for the shift include      The clinical goals for the shift include pt will continue to have ease of breathing throughout shift    Over the shift, the patient did not make progress toward the following goals. Barriers to progression include acute illness. Recommendations to address these barriers include communication.

## 2024-06-29 NOTE — ED PROVIDER NOTES
EMERGENCY DEPARTMENT ENCOUNTER      Pt Name: Gray Kim  MRN: [id number]  Birthdate 1960  Date of evaluation: 6/29/2024  Provider: Jeffrey Nieto DO    CHIEF COMPLAINT       Chief Complaint   Patient presents with    Chest Pain       HISTORY OF PRESENT ILLNESS    Gray Kim is a 63 y.o. female who presents to the emergency department with EMS for shortness of breath as well as chest pain.  Patient reports she began having left-sided chest pain described as a stabbing sensation nonradiating.  Denies any history of cardiac stents or heart attacks.  She states that the pain has nearly resolved at this time with Tylenol.  She has had a dry nonproductive cough as well as associated shortness of breath.  She has been compliant with her anticoagulant therapy.  She states that she previously was on as needed oxygen but her primary physician discontinued it.  Symptoms have been ongoing since this past Tuesday.  In route patient did receive DuoNeb treatment by EMS.  She states that this seemed to help her symptoms to some degree.          Nursing Notes were reviewed.    REVIEW OF SYSTEMS     CONSTITUTIONAL: Denies fever, sweats, chills.   NEURO: Denies numbness, weakness, tingling, headache.   HEENT: Denies sore throat, rhinorrhea, changes in vision.   CARDIO: Endorses chest pain.  Denies palpitations.  PULM: Endorses shortness of breath and cough.    GI: Denies abdominal pain, nausea, vomiting, diarrhea, constipation, melena, hematochezia.  : Denies painful urination, frequency, hematuria.   MSK: Denies recent trauma.   SKIN: Denies rash, lesions.   ENDOCRINE: Denies unexpected weight-loss.   HEME: Denies bleeding disorder.     PAST MEDICAL HISTORY     Past Medical History:   Diagnosis Date    Bipolar disorder, currently in remission, most recent episode unspecified (Multi) 02/11/2021    History of depressed bipolar disorder       SURGICAL HISTORY       Past Surgical History:   Procedure Laterality  Date    CHOLECYSTECTOMY         ALLERGIES     Nsaids (non-steroidal anti-inflammatory drug), Aspirin, Penicillins, and Simvastatin    FAMILY HISTORY     No family history on file.     SOCIAL HISTORY       Social History     Socioeconomic History    Marital status: Single     Spouse name: Not on file    Number of children: Not on file    Years of education: Not on file    Highest education level: Not on file   Occupational History    Not on file   Tobacco Use    Smoking status: Former     Types: Cigarettes    Smokeless tobacco: Never   Substance and Sexual Activity    Alcohol use: Not Currently    Drug use: Not Currently    Sexual activity: Not on file   Other Topics Concern    Not on file   Social History Narrative    Not on file     Social Determinants of Health     Financial Resource Strain: Low Risk  (5/26/2024)    Overall Financial Resource Strain (CARDIA)     Difficulty of Paying Living Expenses: Not hard at all   Food Insecurity: No Food Insecurity (5/26/2024)    Hunger Vital Sign     Worried About Running Out of Food in the Last Year: Never true     Ran Out of Food in the Last Year: Never true   Transportation Needs: No Transportation Needs (5/26/2024)    PRAPARE - Transportation     Lack of Transportation (Medical): No     Lack of Transportation (Non-Medical): No   Physical Activity: Inactive (5/26/2024)    Exercise Vital Sign     Days of Exercise per Week: 0 days     Minutes of Exercise per Session: 0 min   Stress: No Stress Concern Present (5/26/2024)    [employer] [place] [employer] - Occupational Stress Questionnaire     Feeling of Stress : Not at all   Social Connections: Socially Isolated (5/26/2024)    Social Connection and Isolation Panel [NHANES]     Frequency of Communication with Friends and Family: More than three times a week     Frequency of Social Gatherings with Friends and Family: Twice a week     Attends Orthodox Services: Never     Active Member of Clubs or Organizations: No      Attends Club or Organization Meetings: Never     Marital Status: Never    Intimate Partner Violence: Not At Risk (5/26/2024)    Humiliation, Afraid, Rape, and Kick questionnaire     Fear of Current or Ex-Partner: No     Emotionally Abused: No     Physically Abused: No     Sexually Abused: No   Housing Stability: Low Risk  (5/26/2024)    Housing Stability Vital Sign     Unable to Pay for Housing in the Last Year: No     Number of Places Lived in the Last Year: 1     Unstable Housing in the Last Year: No       PHYSICAL EXAM   VS: As documented in the triage note from today's date and EMR flowsheet were reviewed.  Gen: Well developed. No acute distress. Seated in bed. Appears nontoxic.   Skin: Warm. Dry. Intact. No rashes or lesions.  Eyes: Pupils equally round and reactive to light. Clear sclera.   HENT: Atraumatic appearance. Mucosal membranes moist. No oral lesions, uvula midline, airway patent.   CV: Regular rate and regular rhythm. S1, S2.  Chronic +2 bilateral pitting pedal edema. Warm extremities.  Resp: Nonlabored breathing inspiratory expiratory wheeze noted throughout all lung fields. No increased work of breathing.  Saturating appropriately on nasal cannula.  GI: Soft and nontender. No rebound or guarding. Bowel sounds x4 present.   MSK: Symmetric muscle bulk. No joint swelling in the extremities. Compartments are soft. Neurovascularly intact x4 extremities. Radial pulses +2 equal bilaterally.  Pedal pulses +2 equal bilaterally.  No reproducible chest wall tenderness.  Neuro: Alert.  Speech fluent. Moving all extremities. No focal deficits.  Psych: Appropriate. Kempt.    DIAGNOSTIC RESULTS   RADIOLOGY:   Non-plain film images such as CT, Ultrasound and MRI are read by the radiologist. Plain radiographic images are visualized and preliminarily interpreted by the emergency physician with the below findings: Chest x-ray shows no evidence of pneumonia.  No widened mediastinum.      Interpretation per the  Radiologist below, if available at the time of this note:    XR chest 1 view    (Results Pending)         ED BEDSIDE ULTRASOUND:   Performed by ED Physician - none    LABS:  Labs Reviewed   CBC WITH AUTO DIFFERENTIAL - Abnormal       Result Value    WBC 7.5      nRBC 0.0      RBC 4.09      Hemoglobin 11.7 (*)     Hematocrit 37.1      MCV 91      MCH 28.6      MCHC 31.5 (*)     RDW 14.0      Platelets 303      Neutrophils % 65.2      Immature Granulocytes %, Automated 2.3 (*)     Lymphocytes % 18.1      Monocytes % 11.9      Eosinophils % 2.1      Basophils % 0.4      Neutrophils Absolute 4.85      Immature Granulocytes Absolute, Automated 0.17      Lymphocytes Absolute 1.35      Monocytes Absolute 0.89      Eosinophils Absolute 0.16      Basophils Absolute 0.03     COMPREHENSIVE METABOLIC PANEL - Abnormal    Glucose 103 (*)     Sodium 139      Potassium 4.4      Chloride 101      Bicarbonate 32      Anion Gap 10      Urea Nitrogen 33 (*)     Creatinine 1.18 (*)     eGFR 53 (*)     Calcium 9.8      Albumin 3.9      Alkaline Phosphatase 76      Total Protein 7.1      AST 14      Bilirubin, Total 0.4      ALT 13     BLOOD GAS VENOUS FULL PANEL - Abnormal    POCT pH, Venous 7.32 (*)     POCT pCO2, Venous 66 (*)     POCT pO2, Venous 29 (*)     POCT SO2, Venous 34 (*)     POCT Oxy Hemoglobin, Venous 33.5 (*)     POCT Hematocrit Calculated, Venous 35.0 (*)     POCT Sodium, Venous 140      POCT Potassium, Venous 4.4      POCT Chloride, Venous 100      POCT Ionized [place], Venous 1.35 (*)     POCT Glucose, Venous 109 (*)     POCT Lactate, Venous 1.6      POCT Base Excess, Venous 6.1 (*)     POCT HCO3 Calculated, Venous 34.0 (*)     POCT Hemoglobin, Venous 11.5 (*)     POCT Anion Gap, Venous 10.0      Patient Temperature 37.0      FiO2 44      Apparatus CANNULA     MAGNESIUM - Normal    Magnesium 1.98     SERIAL TROPONIN-INITIAL - Normal    Troponin I, High Sensitivity 8      Narrative:     Less than 99th percentile of  normal range cutoff-  Female and children under 18 years old <14 ng/L; Male <21 ng/L: Negative  Repeat testing should be performed if clinically indicated.     Female and children under 18 years old 14-50 ng/L; Male 21-50 ng/L:  Consistent with possible cardiac damage and possible increased clinical   risk. Serial measurements may help to assess extent of myocardial damage.     >50 ng/L: Consistent with cardiac damage, increased clinical risk and  myocardial infarction. Serial measurements may help assess extent of   myocardial damage.      NOTE: Children less than 1 year old may have higher baseline troponin   levels and results should be interpreted in conjunction with the overall   clinical context.     NOTE: Troponin I testing is performed using a different   testing methodology at [facility] than at other   [facility]. Direct result comparisons should only   be made within the same method.   B-TYPE NATRIURETIC PEPTIDE - Normal    BNP 38      Narrative:        <100 pg/mL - Heart failure unlikely  100-299 pg/mL - Intermediate probability of acute heart                  failure exacerbation. Correlate with clinical                  context and patient history.    >=300 pg/mL - Heart Failure likely. Correlate with clinical                  context and patient history.    BNP testing is performed using different testing methodology at [facility] than at other [facility]. Direct result comparisons should only be made within the same method.      SARS-COV-2 PCR - Normal    Coronavirus 2019, PCR Not Detected      Narrative:     This assay has received FDA Emergency Use Authorization (EUA) and is only authorized for the duration of time that circumstances exist to justify the authorization of the emergency use of in vitro diagnostic tests for the detection of SARS-CoV-2 virus and/or diagnosis of COVID-19 infection under section 564(b)(1) of the Act, 21 U.S.C. 360bbb-3(b)(1). This  "assay is an in vitro diagnostic nucleic acid amplification test for the qualitative detection of SARS-CoV-2 from nasopharyngeal specimens and has been validated for use at [facility]. Negative results do not preclude COVID-19 infections and should not be used as the sole basis for diagnosis, treatment, or other management decisions.     TROPONIN SERIES- (INITIAL, 1 HR)    Narrative:     The following orders were created for panel order Troponin I Series, High Sensitivity (0, 1 HR).  Procedure                               Abnormality         Status                     ---------                               -----------         ------                     Troponin I, High Sensiti...[[id number]]  Normal              Final result               Troponin, High Sensitivi...[[id number]]                      In process                   Please view results for these tests on the individual orders.   PROTIME-INR   SERIAL TROPONIN, 1 HOUR   BLOOD GAS VENOUS FULL PANEL       All other labs were within normal range or not returned as of this dictation.    EMERGENCY DEPARTMENT COURSE/MDM:   Vitals:    Vitals:    06/29/24 0527 06/29/24 0531   BP: 105/58 136/65   BP Location: Right arm    Pulse: (!) 111 (!) 109   Resp: 20 (!) 21   Temp: 36 °C (96.8 °F)    SpO2: (!) 86% 96%   Weight: 117 kg (258 lb)    Height: 1.549 m (5' 1\")        I reviewed the patient's triage vitals and they are patient arrives tachycardic and hypoxic she was placed on oxygen and tachycardia is improving oxygen appropriate at this time.  She appears to be in no immediate acute distress.    Due to the above findings the following was ordered suspected COPD exacerbation magnesium Solu-Medrol DuoNebs were ordered.  Patient was placed on nasal cannula saturating appropriately.  D-dimer was discontinued as patient is on anticoagulation and compliant lower concern for PE at this time.  Initial VBG patient is acidotic and retaining CO2 I see no " indication for immediate noninvasive positive pressure ventilation.  She does have a mild JASIEL for which she was encouraged oral hydration.  Mild anemia present close to baseline no active signs of bleeding.  No leukocytosis making infectious etiology less likely.  Cardiac troponin x 2 within normal range no acute injury pattern on EKG lower concern for ACS at this time.  Patient was signed out to the oncoming physician to follow-up on repeat VBG to see if there is indication for noninvasive positive pressure patient will need to be admitted to the hospital.  Patient resting comfortably at this time    ED Course as of 06/29/24 1534   Sat Jun 29, 2024   0542 Interpreted by the Emergency Department Attending: ECG revealed sinus tachycardia at a rate of 109 beats per minute with KY interval 146 , QRS of 66 , QTc of 452.  No acute injury pattern. Previous EKG on May 26 revealed no significant changes.    [MG]      ED Course User Index  [MG] Jeffrey DO Emilia         Diagnoses as of 06/29/24 1534   COPD exacerbation (Multi)       Patient was counseled regarding labs, imaging, likely diagnosis, and plan. All questions were answered.     ------------------------------------------------------------------  Information provided by the patient and EMS  Past medical history complicating workup COPD  Previous medical records reviewed previous hospital admission 5/26/2024  Considered noninvasive positive pressure although not indicated at this time  ------------------------------------------------------------------  ED Medications administered this visit:    Medications   ipratropium-albuteroL (Duo-Neb) 0.5-2.5 mg/3 mL nebulizer solution 3 mL (3 mL nebulization Given 6/29/24 1169)   methylPREDNISolone sod succinate (SOLU-Medrol) injection 125 mg (125 mg intravenous Given 6/29/24 4051)   magnesium sulfate IV 2 g (2 g intravenous New Bag 6/29/24 1151)        Final Impression:   1. COPD exacerbation (Multi)          Jeffrey  DO Emilia    (Please note that portions of this note were completed with a voice recognition program.  Efforts were made to edit the dictations but occasionally words are mis-transcribed.)     Jeffrey Nieto DO  06/29/24 7381

## 2024-06-30 VITALS
TEMPERATURE: 97.2 F | HEIGHT: 61 IN | SYSTOLIC BLOOD PRESSURE: 123 MMHG | BODY MASS INDEX: 48.71 KG/M2 | WEIGHT: 258 LBS | OXYGEN SATURATION: 91 % | DIASTOLIC BLOOD PRESSURE: 59 MMHG | RESPIRATION RATE: 18 BRPM | HEART RATE: 108 BPM

## 2024-06-30 LAB
ALBUMIN SERPL BCP-MCNC: 3.7 G/DL (ref 3.4–5)
ALP SERPL-CCNC: 55 U/L (ref 33–136)
ALT SERPL W P-5'-P-CCNC: 11 U/L (ref 7–45)
ANION GAP SERPL CALC-SCNC: 10 MMOL/L (ref 10–20)
AST SERPL W P-5'-P-CCNC: 12 U/L (ref 9–39)
BILIRUB SERPL-MCNC: 0.4 MG/DL (ref 0–1.2)
BUN SERPL-MCNC: 32 MG/DL (ref 6–23)
CALCIUM SERPL-MCNC: 9.5 MG/DL (ref 8.6–10.3)
CHLORIDE SERPL-SCNC: 101 MMOL/L (ref 98–107)
CO2 SERPL-SCNC: 33 MMOL/L (ref 21–32)
CREAT SERPL-MCNC: 0.97 MG/DL (ref 0.5–1.05)
EGFRCR SERPLBLD CKD-EPI 2021: 67 ML/MIN/1.73M*2
ERYTHROCYTE [DISTWIDTH] IN BLOOD BY AUTOMATED COUNT: 14.2 % (ref 11.5–14.5)
GLUCOSE SERPL-MCNC: 103 MG/DL (ref 74–99)
HCT VFR BLD AUTO: 36.9 % (ref 36–46)
HGB BLD-MCNC: 11.2 G/DL (ref 12–16)
MCH RBC QN AUTO: 28.4 PG (ref 26–34)
MCHC RBC AUTO-ENTMCNC: 30.4 G/DL (ref 32–36)
MCV RBC AUTO: 94 FL (ref 80–100)
NRBC BLD-RTO: 0 /100 WBCS (ref 0–0)
PLATELET # BLD AUTO: 281 X10*3/UL (ref 150–450)
POTASSIUM SERPL-SCNC: 5 MMOL/L (ref 3.5–5.3)
PROT SERPL-MCNC: 6.5 G/DL (ref 6.4–8.2)
RBC # BLD AUTO: 3.94 X10*6/UL (ref 4–5.2)
SODIUM SERPL-SCNC: 139 MMOL/L (ref 136–145)
WBC # BLD AUTO: 8.8 X10*3/UL (ref 4.4–11.3)

## 2024-06-30 PROCEDURE — 85027 COMPLETE CBC AUTOMATED: CPT | Performed by: NURSE PRACTITIONER

## 2024-06-30 PROCEDURE — 2500000001 HC RX 250 WO HCPCS SELF ADMINISTERED DRUGS (ALT 637 FOR MEDICARE OP): Performed by: NURSE PRACTITIONER

## 2024-06-30 PROCEDURE — 94640 AIRWAY INHALATION TREATMENT: CPT

## 2024-06-30 PROCEDURE — 2500000004 HC RX 250 GENERAL PHARMACY W/ HCPCS (ALT 636 FOR OP/ED): Performed by: INTERNAL MEDICINE

## 2024-06-30 PROCEDURE — 2500000002 HC RX 250 W HCPCS SELF ADMINISTERED DRUGS (ALT 637 FOR MEDICARE OP, ALT 636 FOR OP/ED): Performed by: NURSE PRACTITIONER

## 2024-06-30 PROCEDURE — 2500000004 HC RX 250 GENERAL PHARMACY W/ HCPCS (ALT 636 FOR OP/ED): Performed by: NURSE PRACTITIONER

## 2024-06-30 PROCEDURE — 2500000005 HC RX 250 GENERAL PHARMACY W/O HCPCS: Performed by: NURSE PRACTITIONER

## 2024-06-30 PROCEDURE — 36415 COLL VENOUS BLD VENIPUNCTURE: CPT | Performed by: INTERNAL MEDICINE

## 2024-06-30 PROCEDURE — 80053 COMPREHEN METABOLIC PANEL: CPT | Performed by: INTERNAL MEDICINE

## 2024-06-30 PROCEDURE — 1200000002 HC GENERAL ROOM WITH TELEMETRY DAILY

## 2024-06-30 RX ORDER — ONDANSETRON HYDROCHLORIDE 2 MG/ML
4 INJECTION, SOLUTION INTRAVENOUS EVERY 6 HOURS PRN
Status: ACTIVE | OUTPATIENT
Start: 2024-06-30

## 2024-06-30 RX ORDER — LIDOCAINE HYDROCHLORIDE 20 MG/ML
5 INJECTION, SOLUTION EPIDURAL; INFILTRATION; INTRACAUDAL; PERINEURAL ONCE
Status: DISPENSED | OUTPATIENT
Start: 2024-07-01

## 2024-06-30 RX ORDER — TRIAMCINOLONE ACETONIDE 40 MG/ML
40 INJECTION, SUSPENSION INTRA-ARTICULAR; INTRAMUSCULAR ONCE
Status: DISPENSED | OUTPATIENT
Start: 2024-07-01

## 2024-06-30 ASSESSMENT — PAIN SCALES - GENERAL
PAINLEVEL_OUTOF10: 8
PAINLEVEL_OUTOF10: 9
PAINLEVEL_OUTOF10: 3
PAINLEVEL_OUTOF10: 5 - MODERATE PAIN
PAINLEVEL_OUTOF10: 8

## 2024-06-30 ASSESSMENT — PAIN - FUNCTIONAL ASSESSMENT
PAIN_FUNCTIONAL_ASSESSMENT: 0-10

## 2024-06-30 ASSESSMENT — PAIN DESCRIPTION - LOCATION
LOCATION: GENERALIZED
LOCATION: GENERALIZED
LOCATION: KNEE

## 2024-06-30 ASSESSMENT — PAIN SCALES - PAIN ASSESSMENT IN ADVANCED DEMENTIA (PAINAD): TOTALSCORE: MEDICATION (SEE MAR)

## 2024-06-30 NOTE — PROGRESS NOTES
06/30/24 1140   Discharge Planning   Living Arrangements Other (Comment)  ([facility])   Support Systems Friends/neighbors;Family members   Assistance Needed Needs assistance with all ADLs   Type of Residence Nursing home/residential care   Do you have animals or pets at home? No   Home or Post Acute Services Post acute facilities (Rehab/SNF/etc)   Type of Post Acute Facility Services Long term care   Does the patient need discharge transport arranged? Yes   RoundTrip coordination needed? Yes   Has discharge transport been arranged? No     This TCC met with patient at bedside, introduced self and explained role.  Demographic information and insurance verified.  Patient is from [facility].  Needs assistance with all ADLS.  Uses walker to assist with ambulation.  Denies SW needs at this time.  Patient anticipates returning to [facility] at discharge.  DSC tasked with sending return referral.  Patient will need transportation arranged.  BMI is 48.75.  Care Transitions will continue to follow.

## 2024-06-30 NOTE — CONSULTS
"Reason For Consult  Chief complaint hypoxia    History Of Present Illness  Gray Kim is a 63 y.o. female presenting with complaints of chest discomfort and dyspnea.  Patient resides in a nursing facility.  Patient is very sedentary.  She can barely ambulate with use of walker.  Upon arrival to emergency room patient was found to be in mild respiratory distress.  Currently she is on supplemental oxygen nasal cannula 3 L/min.  Patient reports mild nonproductive cough.  No fever or chills.  No nausea vomiting or diarrhea.  No myalgias or arthralgias.  Overall she feels better than 12 hours ago.     Past Medical History  She has a past medical history of Bipolar disorder, currently in remission, most recent episode unspecified (Multi) (02/11/2021).    Surgical History  She has a past surgical history that includes Cholecystectomy.     Social History  She reports that she has quit smoking. Her smoking use included cigarettes. She has never used smokeless tobacco. She reports that she does not currently use alcohol. She reports that she does not currently use drugs.    Family History  No family history on file.     Allergies  Cat dander, Grass pollen, Nsaids (non-steroidal anti-inflammatory drug), Penicillins, Ragweed, and Simvastatin    Review of Systems  10 system review of systems performed and negative for any complaints outside of the ones mentioned in history of present illness     Physical Exam  Head and face no deformities  Oropharynx normal mucosa  Neck is supple no thyromegaly  Chest is symmetric no crackles wheezing  Heart is regular no murmurs  Abdomen is soft and nontender  Skin is intact  Joints are normal  Neurologically patient is moving all 4 limbs.     Last Recorded Vitals  Blood pressure 125/71, pulse 109, temperature 36.1 °C (97 °F), temperature source Temporal, resp. rate 18, height 1.549 m (5' 1\"), weight 117 kg (258 lb), SpO2 94%.          Assessment/Plan     Patient with history of COPD " possible mild exacerbation.  Currently no wheezing.  Imaging does not demonstrate any evidence of infiltrate to suggest pneumonia.    Hypoxia currently on supplemental oxygen 2 L/min nasal cannula.  Obesity and obstructive sleep apnea.  Muscle weakness and deconditioning.  History of bipolar disorder.  Atrial fibrillation.    Plan:  5 days course of doxycycline.  Taper off steroids.  Titrate oxygen for saturation between 89 and 95%.  DVT prophylaxis.  Bronchodilators.  Ambulate as tolerated.  Physical therapy.      Edwin Dobbs MD

## 2024-06-30 NOTE — PROGRESS NOTES
Gray Kim is a 63 y.o. female on day 0 of admission presenting with COPD exacerbation (Multi).    Subjective   Patient fully evaluated 6/30, medications and labs reviewed, continue current and repeat labs in the AM. Appreciate pulmonology input, agree with plan, assess for C-Pap/Bi-Pap usage for pulmonary hygiene. Patient aware and agreeable to current plan, anticipate discharge back to [facility], PT/OT to evaluate, continue plan as above. I spent 50 minutes in the professional and overall care of this patient.    Objective     Last Recorded Vitals  /71 (BP Location: Right arm, Patient Position: Lying)   Pulse 109   Temp 36.1 °C (97 °F) (Temporal)   Resp 18   Wt 117 kg (258 lb)   SpO2 94%   Intake/Output last 3 Shifts:    Intake/Output Summary (Last 24 hours) at 6/30/2024 1535  Last data filed at 6/30/2024 1430  Gross per 24 hour   Intake 583.75 ml   Output 2300 ml   Net -1716.25 ml     Admission Weight  Weight: 117 kg (258 lb) (06/29/24 0527)    Daily Weight  06/29/24 : 117 kg (258 lb)    Image Results  XR chest 1 view  Narrative: STUDY:  Chest Radiograph;  6/29/2024 6:05 AM  INDICATION:  Chest pain.  COMPARISON:  CXR 3/9/2024.  ACCESSION NUMBER(S):  [id number]  ORDERING CLINICIAN:  CAITLIN CHAVEZ  TECHNIQUE:  Frontal chest was obtained at 05:55 hours.  FINDINGS:  CARDIOMEDIASTINAL SILHOUETTE:  Cardiomediastinal silhouette is normal in size and configuration.     LUNGS:  Lungs are clear. Mild bibasilar atelectasis noted, however. There is  no pneumothorax.     ABDOMEN:  No remarkable upper abdominal findings.     BONES:  No acute osseous changes. Deformity of the left humeral surgical neck  likely chronic fracture.  Impression: No acute abnormalities  Signed by Devon Morocho MD              History Of Present Illness  Gray Kim is a 63 y.o. female with past medical history of morbid obesity, bipolar II disorder, schizoaffective disorder, insomnia, COPD, ANN MARIE (not on CPAP), PE on  xarelto, lumbar spinal stenosis, cervical radiculopathy, chronic pain, osteoarthritis, paroxysmal atrial fibrillation, SVT, and GERD.  Patient presents to ER for evaluation of chest pain.  Patient reports she has not been feeling well over the last couple weeks.  She reports yesterday she developed left-sided chest pain without radiation prompting her to the emergency room for evaluation.  She states she has been telling nursing at her facility that she has not been feeling well however no one has listened to her.  She reports shortness of breath, denies any fevers or chills.  She is reporting a nonproductive cough.  She reports at her last discharge she was sent home on oxygen which was discontinued this previous Tuesday.  She denies any abdominal pain, nausea, vomiting.  She reports she is feeling better since arrival to the ER.  She denies any current chest pain.     ED course: Afebrile, hemodynamically stable.  Hypoxic at 86% on room air improved to 96% on 3 L.  Labs significant for creatinine of 1.18 (previously 0.65 5/2024), chronic stable anemia.  Troponin negative x 2.  BNP 38.  CXR negative for acute findings.  Azithromycin, Rocephin, Solu-Medrol, magnesium in ER.     10 point review of systems has been performed and is negative except for what is stated above     Patient has been admitted for further medical management.     Past Medical History  Medical History        Past Medical History:   Diagnosis Date    Bipolar disorder, currently in remission, most recent episode unspecified (Multi) 02/11/2021     History of depressed bipolar disorder         Surgical History  Surgical History         Past Surgical History:   Procedure Laterality Date    CHOLECYSTECTOMY             Social History  She reports that she has quit smoking. Her smoking use included cigarettes. She has never used smokeless tobacco. She reports that she does not currently use alcohol. She reports that she does not currently use drugs.    "  Family History  Family History   No family history on file.     Allergies  Nsaids (non-steroidal anti-inflammatory drug), Aspirin, Penicillins, and Simvastatin     Physical Exam  Constitutional:       Appearance: She is obese.   HENT:      Head: Normocephalic.      Nose: Nose normal.      Mouth/Throat:      Mouth: Mucous membranes are moist.   Eyes:      Extraocular Movements: Extraocular movements intact.      Conjunctiva/sclera: Conjunctivae normal.   Cardiovascular:      Rate and Rhythm: Normal rate and regular rhythm.      Heart sounds: Normal heart sounds.   Pulmonary:      Effort: Pulmonary effort is normal.      Comments: Lungs diminished throughout, faint expiratory wheeze   Abdominal:      General: Bowel sounds are normal.      Palpations: Abdomen is soft.   Musculoskeletal:      Cervical back: Normal range of motion.      Comments: BLE chronic edema   Skin:     Comments: Bilateral lower extremity chronic discoloration   Neurological:      Mental Status: She is alert.      Last Recorded Vitals  Blood pressure 138/77, pulse (!) 112, temperature 36 °C (96.8 °F), resp. rate (!) 30, height 1.549 m (5' 1\"), weight 117 kg (258 lb), SpO2 (!) 93%.     Relevant Results  Scheduled medications  azithromycin, 500 mg, intravenous, Once     Continuous medications  PRN medications     XR chest 1 view     Result Date: 6/29/2024  STUDY: Chest Radiograph;  6/29/2024 6:05 AM INDICATION: Chest pain. COMPARISON: CXR 3/9/2024. ACCESSION NUMBER(S): [id number] ORDERING CLINICIAN: CAITLIN CHAVEZ TECHNIQUE:  Frontal chest was obtained at 05:55 hours. FINDINGS: CARDIOMEDIASTINAL SILHOUETTE: Cardiomediastinal silhouette is normal in size and configuration.  LUNGS: Lungs are clear. Mild bibasilar atelectasis noted, however. There is no pneumothorax.  ABDOMEN: No remarkable upper abdominal findings.  BONES: No acute osseous changes. Deformity of the left humeral surgical neck likely chronic fracture.     No acute abnormalities " Signed by Devon Morocho MD            Results for orders placed or performed during the hospital encounter of 06/29/24 (from the past 24 hour(s))   CBC and Auto Differential   Result Value Ref Range     WBC 7.5 4.4 - 11.3 x10*3/uL     nRBC 0.0 0.0 - 0.0 /100 WBCs     RBC 4.09 4.00 - 5.20 x10*6/uL     Hemoglobin 11.7 (L) 12.0 - 16.0 g/dL     Hematocrit 37.1 36.0 - 46.0 %     MCV 91 80 - 100 fL     MCH 28.6 26.0 - 34.0 pg     MCHC 31.5 (L) 32.0 - 36.0 g/dL     RDW 14.0 11.5 - 14.5 %     Platelets 303 150 - 450 x10*3/uL     Neutrophils % 65.2 40.0 - 80.0 %     Immature Granulocytes %, Automated 2.3 (H) 0.0 - 0.9 %     Lymphocytes % 18.1 13.0 - 44.0 %     Monocytes % 11.9 2.0 - 10.0 %     Eosinophils % 2.1 0.0 - 6.0 %     Basophils % 0.4 0.0 - 2.0 %     Neutrophils Absolute 4.85 1.20 - 7.70 x10*3/uL     Immature Granulocytes Absolute, Automated 0.17 0.00 - 0.70 x10*3/uL     Lymphocytes Absolute 1.35 1.20 - 4.80 x10*3/uL     Monocytes Absolute 0.89 0.10 - 1.00 x10*3/uL     Eosinophils Absolute 0.16 0.00 - 0.70 x10*3/uL     Basophils Absolute 0.03 0.00 - 0.10 x10*3/uL   Comprehensive Metabolic Panel   Result Value Ref Range     Glucose 103 (H) 74 - 99 mg/dL     Sodium 139 136 - 145 mmol/L     Potassium 4.4 3.5 - 5.3 mmol/L     Chloride 101 98 - 107 mmol/L     Bicarbonate 32 21 - 32 mmol/L     Anion Gap 10 10 - 20 mmol/L     Urea Nitrogen 33 (H) 6 - 23 mg/dL     Creatinine 1.18 (H) 0.50 - 1.05 mg/dL     eGFR 53 (L) >60 mL/min/1.73m*2     Calcium 9.8 8.6 - 10.3 mg/dL     Albumin 3.9 3.4 - 5.0 g/dL     Alkaline Phosphatase 76 33 - 136 U/L     Total Protein 7.1 6.4 - 8.2 g/dL     AST 14 9 - 39 U/L     Bilirubin, Total 0.4 0.0 - 1.2 mg/dL     ALT 13 7 - 45 U/L   Magnesium   Result Value Ref Range     Magnesium 1.98 1.60 - 2.40 mg/dL   Troponin I, High Sensitivity, Initial   Result Value Ref Range     Troponin I, High Sensitivity 8 0 - 13 ng/L   BLOOD GAS VENOUS FULL PANEL   Result Value Ref Range     POCT pH, Venous 7.32 (L)  7.33 - 7.43 pH     POCT pCO2, Venous 66 (H) 41 - 51 mm Hg     POCT pO2, Venous 29 (L) 35 - 45 mm Hg     POCT SO2, Venous 34 (L) 45 - 75 %     POCT Oxy Hemoglobin, Venous 33.5 (L) 45.0 - 75.0 %     POCT Hematocrit Calculated, Venous 35.0 (L) 36.0 - 46.0 %     POCT Sodium, Venous 140 136 - 145 mmol/L     POCT Potassium, Venous 4.4 3.5 - 5.3 mmol/L     POCT Chloride, Venous 100 98 - 107 mmol/L     POCT Ionized Calicum, Venous 1.35 (H) 1.10 - 1.33 mmol/L     POCT Glucose, Venous 109 (H) 74 - 99 mg/dL     POCT Lactate, Venous 1.6 0.4 - 2.0 mmol/L     POCT Base Excess, Venous 6.1 (H) -2.0 - 3.0 mmol/L     POCT HCO3 Calculated, Venous 34.0 (H) 22.0 - 26.0 mmol/L     POCT Hemoglobin, Venous 11.5 (L) 12.0 - 16.0 g/dL     POCT Anion Gap, Venous 10.0 10.0 - 25.0 mmol/L     Patient Temperature 37.0 degrees Celsius     FiO2 44 %     Apparatus CANNULA     Troponin, High Sensitivity, 1 Hour   Result Value Ref Range     Troponin I, High Sensitivity 8 0 - 13 ng/L   B-type natriuretic peptide   Result Value Ref Range     BNP 38 0 - 99 pg/mL   Sars-CoV-2 PCR   Result Value Ref Range     Coronavirus 2019, PCR Not Detected Not Detected   Protime-INR   Result Value Ref Range     Protime 13.7 (H) 9.8 - 12.8 seconds     INR 1.2 (H) 0.9 - 1.1   BLOOD GAS VENOUS FULL PANEL   Result Value Ref Range     POCT pH, Venous 7.32 (L) 7.33 - 7.43 pH     POCT pCO2, Venous 65 (H) 41 - 51 mm Hg     POCT pO2, Venous 52 (H) 35 - 45 mm Hg     POCT SO2, Venous 75 45 - 75 %     POCT Oxy Hemoglobin, Venous 74.3 45.0 - 75.0 %     POCT Hematocrit Calculated, Venous 33.0 (L) 36.0 - 46.0 %     POCT Sodium, Venous 138 136 - 145 mmol/L     POCT Potassium, Venous 4.6 3.5 - 5.3 mmol/L     POCT Chloride, Venous 101 98 - 107 mmol/L     POCT Ionized Calicum, Venous 1.32 1.10 - 1.33 mmol/L     POCT Glucose, Venous 100 (H) 74 - 99 mg/dL     POCT Lactate, Venous 1.6 0.4 - 2.0 mmol/L     POCT Base Excess, Venous 5.8 (H) -2.0 - 3.0 mmol/L     POCT HCO3 Calculated, Venous  33.5 (H) 22.0 - 26.0 mmol/L     POCT Hemoglobin, Venous 11.1 (L) 12.0 - 16.0 g/dL     POCT Anion Gap, Venous 8.0 (L) 10.0 - 25.0 mmol/L     Patient Temperature 37.0 degrees Celsius     FiO2 21 %     Assessment/Plan   Principal Problem:    COPD exacerbation (Multi)    Gray Kim is a 63 y.o. female with past medical history of morbid obesity, bipolar II disorder, schizoaffective disorder, insomnia, COPD, ANN MARIE (not on CPAP), PE on xarelto, lumbar spinal stenosis, cervical radiculopathy, chronic pain, osteoarthritis, paroxysmal atrial fibrillation, SVT, and GERD.  Patient presents to ER for evaluation of chest pain.  Patient reports she has not been feeling well over the last couple weeks.  She reports yesterday she developed left-sided chest pain without radiation prompting her to the emergency room for evaluation.  She states she has been telling nursing at her facility that she has not been feeling well however no one has listened to her.  She reports shortness of breath, denies any fevers or chills.  She is reporting a nonproductive cough.  She reports at her last discharge she was sent home on oxygen which was discontinued this previous Tuesday.  She denies any abdominal pain, nausea, vomiting.  She reports she is feeling better since arrival to the ER.  She denies any current chest pain.     #COPD exacerbation  #Acute hypoxic respiratory failure 2/2 above  #Mild JASIEL  #Chest pain suspect 2/2 cough/COPD exacerbation-negative troponins  #ANN MARIE-no CPAP  Admit to telemetry  Defer consult to attending  Continue Solu-Medrol  Supplemental O2 to keep sats above 90%  Change azithromycin to Doxycycline 2/2 possible QTc prolongation interaction with home meds  CXR results as above  Repeat labs in a.m.  Normal saline at 75 cc/h stop after 1 L  Scheduled DuoNebs, as needed albuterol  Continue home meds as appropriate once med rec complete     #Bipolar  #Schizoaffective disorder  #Paroxysmal atrial fibrillation  Awaiting  med rec completion, continue home meds as appropriate     #DVT ppx  SCDs  Continue home Xarelto once med rec complete     I spent 45  minutes in the professional and overall care of this patient.  Patient fully evaluated and plan as above              Patient fully evaluated 6/30, medications and labs reviewed, continue current and repeat labs in the AM. Appreciate pulmonology input, agree with plan, assess for C-Pap/Bi-Pap usage for pulmonary hygiene. Patient aware and agreeable to current plan, anticipate discharge back [facility], PT/OT to evaluate, continue plan as above. I spent 50 minutes in the professional and overall care of this patient.

## 2024-06-30 NOTE — CARE PLAN
The patient's goals for the shift include  easier and better breathing    The clinical goals for the shift include patient's pulse ox will remain greater than 92% for entire shift.

## 2024-06-30 NOTE — CARE PLAN
The patient's goals for the shift include      The clinical goals for the shift include Pt will continue to have improved ease of breathing throughout shift    Over the shift, the patient did not make progress toward the following goals. Barriers to progression include acute illness. Recommendations to address these barriers include communication.

## 2024-07-01 VITALS
SYSTOLIC BLOOD PRESSURE: 131 MMHG | OXYGEN SATURATION: 92 % | HEIGHT: 61 IN | WEIGHT: 258 LBS | RESPIRATION RATE: 18 BRPM | DIASTOLIC BLOOD PRESSURE: 68 MMHG | HEART RATE: 98 BPM | TEMPERATURE: 95.5 F | BODY MASS INDEX: 48.71 KG/M2

## 2024-07-01 LAB
ALBUMIN SERPL BCP-MCNC: 3.5 G/DL (ref 3.4–5)
ALP SERPL-CCNC: 49 U/L (ref 33–136)
ALT SERPL W P-5'-P-CCNC: 9 U/L (ref 7–45)
ANION GAP SERPL CALC-SCNC: 11 MMOL/L (ref 10–20)
AST SERPL W P-5'-P-CCNC: 8 U/L (ref 9–39)
BILIRUB SERPL-MCNC: 0.4 MG/DL (ref 0–1.2)
BUN SERPL-MCNC: 28 MG/DL (ref 6–23)
CALCIUM SERPL-MCNC: 9.5 MG/DL (ref 8.6–10.3)
CHLORIDE SERPL-SCNC: 99 MMOL/L (ref 98–107)
CO2 SERPL-SCNC: 33 MMOL/L (ref 21–32)
CREAT SERPL-MCNC: 0.96 MG/DL (ref 0.5–1.05)
EGFRCR SERPLBLD CKD-EPI 2021: 67 ML/MIN/1.73M*2
ERYTHROCYTE [DISTWIDTH] IN BLOOD BY AUTOMATED COUNT: 14 % (ref 11.5–14.5)
GLUCOSE SERPL-MCNC: 96 MG/DL (ref 74–99)
HCT VFR BLD AUTO: 33.7 % (ref 36–46)
HGB BLD-MCNC: 10.6 G/DL (ref 12–16)
MCH RBC QN AUTO: 28.6 PG (ref 26–34)
MCHC RBC AUTO-ENTMCNC: 31.5 G/DL (ref 32–36)
MCV RBC AUTO: 91 FL (ref 80–100)
NRBC BLD-RTO: 0 /100 WBCS (ref 0–0)
PLATELET # BLD AUTO: 263 X10*3/UL (ref 150–450)
POTASSIUM SERPL-SCNC: 4.6 MMOL/L (ref 3.5–5.3)
PROT SERPL-MCNC: 6.5 G/DL (ref 6.4–8.2)
RBC # BLD AUTO: 3.7 X10*6/UL (ref 4–5.2)
SODIUM SERPL-SCNC: 138 MMOL/L (ref 136–145)
WBC # BLD AUTO: 9.3 X10*3/UL (ref 4.4–11.3)

## 2024-07-01 PROCEDURE — 2500000005 HC RX 250 GENERAL PHARMACY W/O HCPCS: Performed by: INTERNAL MEDICINE

## 2024-07-01 PROCEDURE — 2500000004 HC RX 250 GENERAL PHARMACY W/ HCPCS (ALT 636 FOR OP/ED): Performed by: INTERNAL MEDICINE

## 2024-07-01 PROCEDURE — 2500000001 HC RX 250 WO HCPCS SELF ADMINISTERED DRUGS (ALT 637 FOR MEDICARE OP): Performed by: NURSE PRACTITIONER

## 2024-07-01 PROCEDURE — 2500000004 HC RX 250 GENERAL PHARMACY W/ HCPCS (ALT 636 FOR OP/ED): Performed by: NURSE PRACTITIONER

## 2024-07-01 PROCEDURE — 85027 COMPLETE CBC AUTOMATED: CPT | Performed by: INTERNAL MEDICINE

## 2024-07-01 PROCEDURE — 80053 COMPREHEN METABOLIC PANEL: CPT | Performed by: INTERNAL MEDICINE

## 2024-07-01 PROCEDURE — 36415 COLL VENOUS BLD VENIPUNCTURE: CPT | Performed by: INTERNAL MEDICINE

## 2024-07-01 PROCEDURE — 94640 AIRWAY INHALATION TREATMENT: CPT

## 2024-07-01 PROCEDURE — 2500000002 HC RX 250 W HCPCS SELF ADMINISTERED DRUGS (ALT 637 FOR MEDICARE OP, ALT 636 FOR OP/ED): Performed by: NURSE PRACTITIONER

## 2024-07-01 PROCEDURE — 9420000001 HC RT PATIENT EDUCATION 5 MIN

## 2024-07-01 RX ORDER — DOXYCYCLINE 100 MG/1
100 CAPSULE ORAL 2 TIMES DAILY
Qty: 6 CAPSULE | Refills: 0 | Status: SHIPPED | OUTPATIENT
Start: 2024-07-02 | End: 2024-07-05

## 2024-07-01 RX ORDER — PREDNISONE 10 MG/1
TABLET ORAL EVERY OTHER DAY
Qty: 15 TABLET | Refills: 0 | Status: SHIPPED | OUTPATIENT
Start: 2024-07-01 | End: 2024-07-11

## 2024-07-01 ASSESSMENT — COGNITIVE AND FUNCTIONAL STATUS - GENERAL
DRESSING REGULAR LOWER BODY CLOTHING: A LOT
HELP NEEDED FOR BATHING: A LOT
TOILETING: A LOT
WALKING IN HOSPITAL ROOM: A LOT
MOVING FROM LYING ON BACK TO SITTING ON SIDE OF FLAT BED WITH BEDRAILS: A LOT
DAILY ACTIVITIY SCORE: 14
PERSONAL GROOMING: A LOT
MOVING TO AND FROM BED TO CHAIR: A LOT
CLIMB 3 TO 5 STEPS WITH RAILING: A LOT
STANDING UP FROM CHAIR USING ARMS: A LOT
MOBILITY SCORE: 12
TURNING FROM BACK TO SIDE WHILE IN FLAT BAD: A LOT
DRESSING REGULAR UPPER BODY CLOTHING: A LOT

## 2024-07-01 ASSESSMENT — PAIN DESCRIPTION - LOCATION
LOCATION: LEG
LOCATION: GENERALIZED
LOCATION: GENERALIZED

## 2024-07-01 ASSESSMENT — PAIN SCALES - GENERAL
PAINLEVEL_OUTOF10: 6
PAINLEVEL_OUTOF10: 8
PAINLEVEL_OUTOF10: 4
PAINLEVEL_OUTOF10: 5 - MODERATE PAIN
PAINLEVEL_OUTOF10: 4
PAINLEVEL_OUTOF10: 8

## 2024-07-01 ASSESSMENT — PAIN - FUNCTIONAL ASSESSMENT
PAIN_FUNCTIONAL_ASSESSMENT: 0-10

## 2024-07-01 ASSESSMENT — PAIN DESCRIPTION - ORIENTATION: ORIENTATION: RIGHT;LEFT

## 2024-07-01 ASSESSMENT — PAIN DESCRIPTION - DESCRIPTORS: DESCRIPTORS: SORE

## 2024-07-01 NOTE — NURSING NOTE
Pulmonary Disease Navigator Documentation:    Pulmonary disease education was performed by the Respiratory Therapist with a good understanding: yes  Home oxygen: Patient from LT facility, states she provide O2 as needed.  Currently on 3 lpm nc tolerating well  COPD triggers discussed and when to notify physician?  yes  COPD Education booklet given to patient with education?  no  Benefits of participating in a Pulmonary Rehab program discussed: no, condition not appropriate  Pulmonary Rehab Referral written?  na  Home medication usage education done? Yes, Albuterol Nebulizer; Flonase; Incruse    Comments: Patient stated she follows with Dr. Petty for any outpatient pulmonary needs.  Patient stated she does not feel she needs an outpatient pulmonologist at this time.  Patient declined having PFT or Polysomnography scheduled at this time.  Patient stated she had attempted CPAP in the past and was not able to tolerate machine.  Patient encouraged to discuss with PCP and reconsider as PAP mask/unit options have improved considerably since her previous experience.

## 2024-07-01 NOTE — PROGRESS NOTES
Assessment and Plan  Patient is 63 y.o. female  with the following medical Problems:   1.  COPD exacerbation  2.  Obstructive sleep apnea, not on home CPAP  3.  Hypoxic respiratory failure requiring supplemental oxygen  4.  Obesity  5.  History of atrial fibrillation on anticoagulation  6.  History of bipolar disorder  7.  Deconditioning  8.  DVT prophylaxis  9. Hx PE on xarelto    Plan of Care:    -Continue scheduled duonebs  -Continue doxycycline  -Continue IV steroids methylprednisolone 40 mg daily  -Continue Xarelto  -Patient will need an outpatient sleep study and pulmonary function testing performed  -We will obtain an ABG morning tomorrow  -Can stop spiriva while on steroids/short acting nebs    History of Present Illness:     Gray Kim is a 63 y.o. female presenting with complaints of chest discomfort and dyspnea.  Patient resides in a nursing facility.  Patient is very sedentary.  She can barely ambulate with use of walker.  Upon arrival to emergency room patient was found to be in mild respiratory distress.  Currently she is on supplemental oxygen nasal cannula 3 L/min.  Patient reports mild nonproductive cough.  No fever or chills.  No nausea vomiting or diarrhea.  No myalgias or arthralgias.  Overall she feels better than 12 hours ago.     Past Medical/Surgical History:   Past Medical History:   Diagnosis Date    Bipolar disorder, currently in remission, most recent episode unspecified (Multi) 02/11/2021    History of depressed bipolar disorder     Past Surgical History:   Procedure Laterality Date    CHOLECYSTECTOMY         Family History:   No relevant family history has been documented for this patient.    Allergies:     Allergies   Allergen Reactions    Cat Dander Itching    Grass Pollen Itching    Nsaids (Non-Steroidal Anti-Inflammatory Drug) GI Upset    Penicillins Hives and Rash    Ragweed Itching    Simvastatin Hives and GI Upset         Social history:   reports that she has quit  smoking. Her smoking use included cigarettes. She has never used smokeless tobacco. She reports that she does not currently use alcohol. She reports that she does not currently use drugs.    Current Medications:   No recently discontinued medications to reconcile    Review of Systems:   General: denies weight gain, denies loss of appetite, fever, chills, night sweats.  HEENT: denies headaches, dizziness, head trauma, visual changes, eye pain, tinnitus, nosebleeds, hoarseness or throat pain    Respiratory: denies chest pain, and hemoptysis. + for shortness of breath and cough   Cardiovascular: denies orthopnea, paroxysmal nocturnal dyspnea, leg swelling, and previous heart attack.    Gastrointestinal: denies pain, nausea vomiting, diarrhea, constipation, melena. Positive or bleeding.   Genitourinary: denies hematuria, frequency, urgency or dysuria  Neurology: denies syncope, seizures, paralysis, paraesthesia   Endocrine: denies polyuria, polydipsia, skin or hair changes, and heat or cold intolerance  Musculoskeletal: denies joint pain, swelling, arthritis or myalgia  Hematologic: denies bleeding, adenopathy and easy bruising  Skin: denies rashes and skin discoloration  Psychiatry: denies depression    Physical Exam:   Vital Signs:   Vitals:    07/01/24 1244   BP:    Pulse:    Resp:    Temp:    SpO2: 92%       Input/Output:    Intake/Output Summary (Last 24 hours) at 7/1/2024 1431  Last data filed at 7/1/2024 0754  Gross per 24 hour   Intake 590 ml   Output 2450 ml   Net -1860 ml       Oxygen requirements: 3L NC    Ventilator Information:  FiO2 (%):  [32 %-44 %] 32 %          General appearance: Obese  HEENT: Atraumatic/normocephalic, EOMI, DAVID, pharynx clear, moist mucosa, redness of the uvula appreciated,   Neck: Supple, no jugular venous distension, lymphadenopathy, thyromegaly or carotid bruits  Chest: Decreased sounds bilaterally, on 3L NC  Cardiovascular: Normal S1, S2, regular rate and rhythm, no murmur, rub  or gallop  Abdomen: Normal sounds present, soft, lax with no tenderness, no hepatosplenomegaly, and no masses  Extremities: No edema. Pulses are equally present.   Skin: intact, no rashes   Neurologic: Alert and oriented x 3, No focal deficit     Investigations:  Labs, radiological imaging and cardiac work up were personally reviewed          .       STAFF PHYSICIAN NOTE OF PERSONAL INVOLVEMENT IN CARE  As the attending physician, I certify that I personally reviewed the patient's history and personally examined the patient to confirm the physical findings described above, and that I reviewed the relevant imaging studies and available reports.  I also discussed the differential diagnosis and all of the proposed management plans with the patient and individuals accompanying the patient to this visit.  They had the opportunity to ask questions about the proposed management plans and to have those questions answered.

## 2024-07-01 NOTE — CARE PLAN
The patient's goals for the shift include  decreased pain    The clinical goals for the shift include patient will have a controlled pain level throughout the shift    Over the shift, the patient did not make progress toward the following goals. Barriers to progression include chronic pain and acute illness. Recommendations to address these barriers include frequent assessment and medication schedule.

## 2024-07-01 NOTE — CARE PLAN
The patient's goals for the shift include  pain management    The clinical goals for the shift include Pt will continue to have improved ease of breathing throughout shift

## 2024-07-01 NOTE — DISCHARGE SUMMARY
Discharge Diagnosis  COPD exacerbation (Multi)    Issues Requiring Follow-Up  Patient fully evaluated.  Respiratory status is improved.  Continue present medications with antibiotics and steroids.  Intra-articular injection given to the right knee without complications today.  Patient cleared for discharge back to skilled nursing today.    Discharge Meds     Your medication list        START taking these medications        Instructions Last Dose Given Next Dose Due   doxycycline 100 mg capsule  Commonly known as: Vibramycin  Start taking on: July 2, 2024      Take 1 capsule (100 mg) by mouth 2 times a day for 3 days. Take with at least 8 ounces (large glass) of water, do not lie down for 30 minutes after Do not fill before July 2, 2024.       predniSONE 10 mg tablet  Commonly known as: Deltasone  Start taking on: July 1, 2024      Take 5 tablets (50 mg) by mouth every other day for 2 days, THEN 4 tablets (40 mg) every other day for 2 days, THEN 3 tablets (30 mg) every other day for 2 days, THEN 2 tablets (20 mg) every other day for 2 days, THEN 1 tablet (10 mg) every other day for 2 days.              CONTINUE taking these medications        Instructions Last Dose Given Next Dose Due   acetaminophen 325 mg tablet  Commonly known as: Tylenol      Take 2 tablets (650 mg) by mouth every 8 hours if needed for mild pain (1 - 3).       albuterol 2.5 mg /3 mL (0.083 %) nebulizer solution           busPIRone 10 mg tablet  Commonly known as: Buspar           cloNIDine 0.1 mg tablet  Commonly known as: Catapres           Depakote 500 mg EC tablet  Generic drug: divalproex           divalproex 125 mg EC tablet  Commonly known as: Depakote           DULoxetine 60 mg DR capsule  Commonly known as: Cymbalta           ezetimibe 10 mg tablet  Commonly known as: Zetia           fluticasone 50 mcg/actuation nasal spray  Commonly known as: Flonase           furosemide 20 mg tablet  Commonly known as: Lasix            HYDROcodone-acetaminophen 5-325 mg tablet  Commonly known as: Norco           hydrocortisone 25 mg suppository  Commonly known as: Anusol-HC      Insert 1 suppository (25 mg) into the rectum 2 times a day.       hydrOXYzine pamoate 25 mg capsule  Commonly known as: Vistaril           Incruse Ellipta 62.5 mcg/actuation inhalation  Generic drug: umeclidinium           lactobacillus acidophilus capsule           lidocaine 2 % mucosal jelly (Uro-Jet)      Apply topically 2 times daily (morning and late afternoon).       lisinopril 10 mg tablet           loratadine 10 mg tablet  Commonly known as: Claritin           melatonin 3 mg tablet           ondansetron 4 mg tablet  Commonly known as: Zofran           pantoprazole 40 mg EC tablet  Commonly known as: ProtoNix           phenylephrine-cocoa butter 0.25-88.44 % suppository  Commonly known as: Hemorrhoidal           polyethylene glycol 17 gram packet  Commonly known as: Glycolax, Miralax      Take 17 g by mouth 2 times a day.       rivaroxaban 20 mg tablet  Commonly known as: Xarelto           simethicone 80 mg chewable tablet  Commonly known as: Mylicon      Chew 1 tablet (80 mg) 4 times a day.       Spiriva Respimat 2.5 mcg/actuation inhaler  Generic drug: tiotropium           traZODone 100 mg tablet  Commonly known as: Desyrel           ziprasidone 60 mg capsule  Commonly known as: Geodon                     Where to Get Your Medications        These medications were sent to [place] [id number] [place] [id number] [facility] [address]      Phone: [phone]   doxycycline 100 mg capsule  predniSONE 10 mg tablet         Test Results Pending At Discharge  Pending Labs       No current pending labs.            Hospital Course         Nahun Petty MD  Physician  Internal Medicine     Progress Notes      Signed     Date of Service: 6/30/2024  3:35 PM     Signed       Expand All Collapse All    Gray Kim is a 63 y.o.  female on day 0 of admission presenting with COPD exacerbation (Multi).        Subjective   Patient fully evaluated 6/30, medications and labs reviewed, continue current and repeat labs in the AM. Appreciate pulmonology input, agree with plan, assess for C-Pap/Bi-Pap usage for pulmonary hygiene. Patient aware and agreeable to current plan, [facility]OT to evaluate, continue plan as above. I spent 50 minutes in the professional and overall care of this patient.           Objective   Last Recorded Vitals  /71 (BP Location: Right arm, Patient Position: Lying)   Pulse 109   Temp 36.1 °C (97 °F) (Temporal)   Resp 18   Wt 117 kg (258 lb)   SpO2 94%   Intake/Output last 3 Shifts:     Intake/Output Summary (Last 24 hours) at 6/30/2024 1535  Last data filed at 6/30/2024 1430      Gross per 24 hour   Intake 583.75 ml   Output 2300 ml   Net -1716.25 ml      Admission Weight  Weight: 117 kg (258 lb) (06/29/24 0527)     Daily Weight  06/29/24 : 117 kg (258 lb)     Image Results  XR chest 1 view  Narrative: STUDY:  Chest Radiograph;  6/29/2024 6:05 AM  INDICATION:  Chest pain.  COMPARISON:  CXR 3/9/2024.  ACCESSION NUMBER(S):  [id number]  ORDERING CLINICIAN:  CAITLIN CHAVEZ  TECHNIQUE:  Frontal chest was obtained at 05:55 hours.  FINDINGS:  CARDIOMEDIASTINAL SILHOUETTE:  Cardiomediastinal silhouette is normal in size and configuration.     LUNGS:  Lungs are clear. Mild bibasilar atelectasis noted, however. There is  no pneumothorax.     ABDOMEN:  No remarkable upper abdominal findings.     BONES:  No acute osseous changes. Deformity of the left humeral surgical neck  likely chronic fracture.  Impression: No acute abnormalities  Signed by Devon Morocho MD                  History Of Present Illness  Gray Kim is a 63 y.o. female with past medical history of morbid obesity, bipolar II disorder, schizoaffective disorder, insomnia, COPD, ANN MARIE (not on CPAP), PE on xarelto, lumbar  spinal stenosis, cervical radiculopathy, chronic pain, osteoarthritis, paroxysmal atrial fibrillation, SVT, and GERD.  Patient presents to ER for evaluation of chest pain.  Patient reports she has not been feeling well over the last couple weeks.  She reports yesterday she developed left-sided chest pain without radiation prompting her to the emergency room for evaluation.  She states she has been telling nursing at her facility that she has not been feeling well however no one has listened to her.  She reports shortness of breath, denies any fevers or chills.  She is reporting a nonproductive cough.  She reports at her last discharge she was sent home on oxygen which was discontinued this previous Tuesday.  She denies any abdominal pain, nausea, vomiting.  She reports she is feeling better since arrival to the ER.  She denies any current chest pain.     ED course: Afebrile, hemodynamically stable.  Hypoxic at 86% on room air improved to 96% on 3 L.  Labs significant for creatinine of 1.18 (previously 0.65 5/2024), chronic stable anemia.  Troponin negative x 2.  BNP 38.  CXR negative for acute findings.  Azithromycin, Rocephin, Solu-Medrol, magnesium in ER.     10 point review of systems has been performed and is negative except for what is stated above     Patient has been admitted for further medical management.     Past Medical History  Medical History           Past Medical History:   Diagnosis Date    Bipolar disorder, currently in remission, most recent episode unspecified (Multi) 02/11/2021     History of depressed bipolar disorder         Surgical History  Surgical History             Past Surgical History:   Procedure Laterality Date    CHOLECYSTECTOMY             Social History  She reports that she has quit smoking. Her smoking use included cigarettes. She has never used smokeless tobacco. She reports that she does not currently use alcohol. She reports that she does not currently use drugs.     Family  "History  Family History   No family history on file.      Allergies  Nsaids (non-steroidal anti-inflammatory drug), Aspirin, Penicillins, and Simvastatin     Physical Exam  Constitutional:       Appearance: She is obese.   HENT:      Head: Normocephalic.      Nose: Nose normal.      Mouth/Throat:      Mouth: Mucous membranes are moist.   Eyes:      Extraocular Movements: Extraocular movements intact.      Conjunctiva/sclera: Conjunctivae normal.   Cardiovascular:      Rate and Rhythm: Normal rate and regular rhythm.      Heart sounds: Normal heart sounds.   Pulmonary:      Effort: Pulmonary effort is normal.      Comments: Lungs diminished throughout, faint expiratory wheeze   Abdominal:      General: Bowel sounds are normal.      Palpations: Abdomen is soft.   Musculoskeletal:      Cervical back: Normal range of motion.      Comments: BLE chronic edema   Skin:     Comments: Bilateral lower extremity chronic discoloration   Neurological:      Mental Status: She is alert.      Last Recorded Vitals  Blood pressure 138/77, pulse (!) 112, temperature 36 °C (96.8 °F), resp. rate (!) 30, height 1.549 m (5' 1\"), weight 117 kg (258 lb), SpO2 (!) 93%.     Relevant Results  Scheduled medications  azithromycin, 500 mg, intravenous, Once     Continuous medications  PRN medications     XR chest 1 view     Result Date: 6/29/2024  STUDY: Chest Radiograph;  6/29/2024 6:05 AM INDICATION: Chest pain. COMPARISON: CXR 3/9/2024. ACCESSION NUMBER(S): [id number] ORDERING CLINICIAN: CAITLIN CHAVEZ TECHNIQUE:  Frontal chest was obtained at 05:55 hours. FINDINGS: CARDIOMEDIASTINAL SILHOUETTE: Cardiomediastinal silhouette is normal in size and configuration.  LUNGS: Lungs are clear. Mild bibasilar atelectasis noted, however. There is no pneumothorax.  ABDOMEN: No remarkable upper abdominal findings.  BONES: No acute osseous changes. Deformity of the left humeral surgical neck likely chronic fracture.     No acute abnormalities Signed by " Devon Morocho MD                Results for orders placed or performed during the hospital encounter of 06/29/24 (from the past 24 hour(s))   CBC and Auto Differential   Result Value Ref Range     WBC 7.5 4.4 - 11.3 x10*3/uL     nRBC 0.0 0.0 - 0.0 /100 WBCs     RBC 4.09 4.00 - 5.20 x10*6/uL     Hemoglobin 11.7 (L) 12.0 - 16.0 g/dL     Hematocrit 37.1 36.0 - 46.0 %     MCV 91 80 - 100 fL     MCH 28.6 26.0 - 34.0 pg     MCHC 31.5 (L) 32.0 - 36.0 g/dL     RDW 14.0 11.5 - 14.5 %     Platelets 303 150 - 450 x10*3/uL     Neutrophils % 65.2 40.0 - 80.0 %     Immature Granulocytes %, Automated 2.3 (H) 0.0 - 0.9 %     Lymphocytes % 18.1 13.0 - 44.0 %     Monocytes % 11.9 2.0 - 10.0 %     Eosinophils % 2.1 0.0 - 6.0 %     Basophils % 0.4 0.0 - 2.0 %     Neutrophils Absolute 4.85 1.20 - 7.70 x10*3/uL     Immature Granulocytes Absolute, Automated 0.17 0.00 - 0.70 x10*3/uL     Lymphocytes Absolute 1.35 1.20 - 4.80 x10*3/uL     Monocytes Absolute 0.89 0.10 - 1.00 x10*3/uL     Eosinophils Absolute 0.16 0.00 - 0.70 x10*3/uL     Basophils Absolute 0.03 0.00 - 0.10 x10*3/uL   Comprehensive Metabolic Panel   Result Value Ref Range     Glucose 103 (H) 74 - 99 mg/dL     Sodium 139 136 - 145 mmol/L     Potassium 4.4 3.5 - 5.3 mmol/L     Chloride 101 98 - 107 mmol/L     Bicarbonate 32 21 - 32 mmol/L     Anion Gap 10 10 - 20 mmol/L     Urea Nitrogen 33 (H) 6 - 23 mg/dL     Creatinine 1.18 (H) 0.50 - 1.05 mg/dL     eGFR 53 (L) >60 mL/min/1.73m*2     Calcium 9.8 8.6 - 10.3 mg/dL     Albumin 3.9 3.4 - 5.0 g/dL     Alkaline Phosphatase 76 33 - 136 U/L     Total Protein 7.1 6.4 - 8.2 g/dL     AST 14 9 - 39 U/L     Bilirubin, Total 0.4 0.0 - 1.2 mg/dL     ALT 13 7 - 45 U/L   Magnesium   Result Value Ref Range     Magnesium 1.98 1.60 - 2.40 mg/dL   Troponin I, High Sensitivity, Initial   Result Value Ref Range     Troponin I, High Sensitivity 8 0 - 13 ng/L   BLOOD GAS VENOUS FULL PANEL   Result Value Ref Range     POCT pH, Venous 7.32 (L) 7.33 -  7.43 pH     POCT pCO2, Venous 66 (H) 41 - 51 mm Hg     POCT pO2, Venous 29 (L) 35 - 45 mm Hg     POCT SO2, Venous 34 (L) 45 - 75 %     POCT Oxy Hemoglobin, Venous 33.5 (L) 45.0 - 75.0 %     POCT Hematocrit Calculated, Venous 35.0 (L) 36.0 - 46.0 %     POCT Sodium, Venous 140 136 - 145 mmol/L     POCT Potassium, Venous 4.4 3.5 - 5.3 mmol/L     POCT Chloride, Venous 100 98 - 107 mmol/L     POCT Ionized Calicum, Venous 1.35 (H) 1.10 - 1.33 mmol/L     POCT Glucose, Venous 109 (H) 74 - 99 mg/dL     POCT Lactate, Venous 1.6 0.4 - 2.0 mmol/L     POCT Base Excess, Venous 6.1 (H) -2.0 - 3.0 mmol/L     POCT HCO3 Calculated, Venous 34.0 (H) 22.0 - 26.0 mmol/L     POCT Hemoglobin, Venous 11.5 (L) 12.0 - 16.0 g/dL     POCT Anion Gap, Venous 10.0 10.0 - 25.0 mmol/L     Patient Temperature 37.0 degrees Celsius     FiO2 44 %     Apparatus CANNULA     Troponin, High Sensitivity, 1 Hour   Result Value Ref Range     Troponin I, High Sensitivity 8 0 - 13 ng/L   B-type natriuretic peptide   Result Value Ref Range     BNP 38 0 - 99 pg/mL   Sars-CoV-2 PCR   Result Value Ref Range     Coronavirus 2019, PCR Not Detected Not Detected   Protime-INR   Result Value Ref Range     Protime 13.7 (H) 9.8 - 12.8 seconds     INR 1.2 (H) 0.9 - 1.1   BLOOD GAS VENOUS FULL PANEL   Result Value Ref Range     POCT pH, Venous 7.32 (L) 7.33 - 7.43 pH     POCT pCO2, Venous 65 (H) 41 - 51 mm Hg     POCT pO2, Venous 52 (H) 35 - 45 mm Hg     POCT SO2, Venous 75 45 - 75 %     POCT Oxy Hemoglobin, Venous 74.3 45.0 - 75.0 %     POCT Hematocrit Calculated, Venous 33.0 (L) 36.0 - 46.0 %     POCT Sodium, Venous 138 136 - 145 mmol/L     POCT Potassium, Venous 4.6 3.5 - 5.3 mmol/L     POCT Chloride, Venous 101 98 - 107 mmol/L     POCT Ionized Calicum, Venous 1.32 1.10 - 1.33 mmol/L     POCT Glucose, Venous 100 (H) 74 - 99 mg/dL     POCT Lactate, Venous 1.6 0.4 - 2.0 mmol/L     POCT Base Excess, Venous 5.8 (H) -2.0 - 3.0 mmol/L     POCT HCO3 Calculated, Venous 33.5 (H)  22.0 - 26.0 mmol/L     POCT Hemoglobin, Venous 11.1 (L) 12.0 - 16.0 g/dL     POCT Anion Gap, Venous 8.0 (L) 10.0 - 25.0 mmol/L     Patient Temperature 37.0 degrees Celsius     FiO2 21 %      Assessment/Plan   Principal Problem:    COPD exacerbation (Multi)     Gray Kim is a 63 y.o. female with past medical history of morbid obesity, bipolar II disorder, schizoaffective disorder, insomnia, COPD, ANN MARIE (not on CPAP), PE on xarelto, lumbar spinal stenosis, cervical radiculopathy, chronic pain, osteoarthritis, paroxysmal atrial fibrillation, SVT, and GERD.  Patient presents to ER for evaluation of chest pain.  Patient reports she has not been feeling well over the last couple weeks.  She reports yesterday she developed left-sided chest pain without radiation prompting her to the emergency room for evaluation.  She states she has been telling nursing at her facility that she has not been feeling well however no one has listened to her.  She reports shortness of breath, denies any fevers or chills.  She is reporting a nonproductive cough.  She reports at her last discharge she was sent home on oxygen which was discontinued this previous Tuesday.  She denies any abdominal pain, nausea, vomiting.  She reports she is feeling better since arrival to the ER.  She denies any current chest pain.     #COPD exacerbation  #Acute hypoxic respiratory failure 2/2 above  #Mild JASIEL  #Chest pain suspect 2/2 cough/COPD exacerbation-negative troponins  #ANN MARIE-no CPAP  Admit to telemetry  Defer consult to attending  Continue Solu-Medrol  Supplemental O2 to keep sats above 90%  Change azithromycin to Doxycycline 2/2 possible QTc prolongation interaction with home meds  CXR results as above  Repeat labs in a.m.  Normal saline at 75 cc/h stop after 1 L  Scheduled DuoNebs, as needed albuterol  Continue home meds as appropriate once med rec complete     #Bipolar  #Schizoaffective disorder  #Paroxysmal atrial fibrillation  Awaiting med rec  completion, continue home meds as appropriate     #DVT ppx  SCDs  Continue home Xarelto once med rec complete     I spent 45  minutes in the professional and overall care of this patient.  Patient fully evaluated and plan as above               Patient fully evaluated 6/30, medications and labs reviewed, continue current and repeat labs in the AM. Appreciate pulmonology input, agree with plan, assess for C-Pap/Bi-Pap usage for pulmonary hygiene. Patient aware and agreeable to current plan, anticipate discharge back to [facility], PT/OT to evaluate, continue plan as above. I spent 50 minutes in the professional and overall care of this patient.                               Revision History         Pertinent Physical Exam At Time of Discharge  Physical Exam    Outpatient Follow-Up  No future appointments.      Nahun Petty MD

## 2024-07-05 ENCOUNTER — APPOINTMENT (OUTPATIENT)
Dept: CARDIOLOGY | Facility: HOSPITAL | Age: 64
End: 2024-07-05
Payer: COMMERCIAL

## 2024-07-05 ENCOUNTER — HOSPITAL ENCOUNTER (EMERGENCY)
Facility: HOSPITAL | Age: 64
Discharge: HOME | End: 2024-07-05
Attending: STUDENT IN AN ORGANIZED HEALTH CARE EDUCATION/TRAINING PROGRAM
Payer: COMMERCIAL

## 2024-07-05 ENCOUNTER — APPOINTMENT (OUTPATIENT)
Dept: RADIOLOGY | Facility: HOSPITAL | Age: 64
End: 2024-07-05
Payer: COMMERCIAL

## 2024-07-05 VITALS
RESPIRATION RATE: 18 BRPM | WEIGHT: 257.94 LBS | OXYGEN SATURATION: 92 % | HEART RATE: 93 BPM | TEMPERATURE: 96.4 F | SYSTOLIC BLOOD PRESSURE: 127 MMHG | BODY MASS INDEX: 48.74 KG/M2 | DIASTOLIC BLOOD PRESSURE: 59 MMHG

## 2024-07-05 DIAGNOSIS — R07.89 ATYPICAL CHEST PAIN: Primary | ICD-10-CM

## 2024-07-05 LAB
ALBUMIN SERPL BCP-MCNC: 3.6 G/DL (ref 3.4–5)
ALP SERPL-CCNC: 61 U/L (ref 33–136)
ALT SERPL W P-5'-P-CCNC: 12 U/L (ref 7–45)
ANION GAP SERPL CALC-SCNC: 11 MMOL/L (ref 10–20)
AST SERPL W P-5'-P-CCNC: 24 U/L (ref 9–39)
BASOPHILS # BLD AUTO: 0.03 X10*3/UL (ref 0–0.1)
BASOPHILS NFR BLD AUTO: 0.4 %
BILIRUB SERPL-MCNC: 0.3 MG/DL (ref 0–1.2)
BUN SERPL-MCNC: 29 MG/DL (ref 6–23)
CALCIUM SERPL-MCNC: 8.9 MG/DL (ref 8.6–10.3)
CARDIAC TROPONIN I PNL SERPL HS: 5 NG/L (ref 0–13)
CARDIAC TROPONIN I PNL SERPL HS: 5 NG/L (ref 0–13)
CHLORIDE SERPL-SCNC: 101 MMOL/L (ref 98–107)
CO2 SERPL-SCNC: 29 MMOL/L (ref 21–32)
CREAT SERPL-MCNC: 0.87 MG/DL (ref 0.5–1.05)
EGFRCR SERPLBLD CKD-EPI 2021: 76 ML/MIN/1.73M*2
EOSINOPHIL # BLD AUTO: 0.24 X10*3/UL (ref 0–0.7)
EOSINOPHIL NFR BLD AUTO: 3 %
ERYTHROCYTE [DISTWIDTH] IN BLOOD BY AUTOMATED COUNT: 14.1 % (ref 11.5–14.5)
GLUCOSE SERPL-MCNC: 98 MG/DL (ref 74–99)
HCT VFR BLD AUTO: 35.9 % (ref 36–46)
HGB BLD-MCNC: 11.4 G/DL (ref 12–16)
IMM GRANULOCYTES # BLD AUTO: 0.15 X10*3/UL (ref 0–0.7)
IMM GRANULOCYTES NFR BLD AUTO: 1.9 % (ref 0–0.9)
LYMPHOCYTES # BLD AUTO: 1.61 X10*3/UL (ref 1.2–4.8)
LYMPHOCYTES NFR BLD AUTO: 20.3 %
MAGNESIUM SERPL-MCNC: 1.88 MG/DL (ref 1.6–2.4)
MCH RBC QN AUTO: 28.8 PG (ref 26–34)
MCHC RBC AUTO-ENTMCNC: 31.8 G/DL (ref 32–36)
MCV RBC AUTO: 91 FL (ref 80–100)
MONOCYTES # BLD AUTO: 0.79 X10*3/UL (ref 0.1–1)
MONOCYTES NFR BLD AUTO: 10 %
NEUTROPHILS # BLD AUTO: 5.1 X10*3/UL (ref 1.2–7.7)
NEUTROPHILS NFR BLD AUTO: 64.4 %
NRBC BLD-RTO: 0 /100 WBCS (ref 0–0)
PLATELET # BLD AUTO: 300 X10*3/UL (ref 150–450)
POTASSIUM SERPL-SCNC: 4.5 MMOL/L (ref 3.5–5.3)
PROT SERPL-MCNC: 6.3 G/DL (ref 6.4–8.2)
RBC # BLD AUTO: 3.96 X10*6/UL (ref 4–5.2)
SARS-COV-2 RNA RESP QL NAA+PROBE: NOT DETECTED
SODIUM SERPL-SCNC: 136 MMOL/L (ref 136–145)
WBC # BLD AUTO: 7.9 X10*3/UL (ref 4.4–11.3)

## 2024-07-05 PROCEDURE — 85025 COMPLETE CBC W/AUTO DIFF WBC: CPT | Performed by: STUDENT IN AN ORGANIZED HEALTH CARE EDUCATION/TRAINING PROGRAM

## 2024-07-05 PROCEDURE — 71045 X-RAY EXAM CHEST 1 VIEW: CPT

## 2024-07-05 PROCEDURE — 80053 COMPREHEN METABOLIC PANEL: CPT | Performed by: STUDENT IN AN ORGANIZED HEALTH CARE EDUCATION/TRAINING PROGRAM

## 2024-07-05 PROCEDURE — 93005 ELECTROCARDIOGRAM TRACING: CPT

## 2024-07-05 PROCEDURE — 87635 SARS-COV-2 COVID-19 AMP PRB: CPT | Performed by: STUDENT IN AN ORGANIZED HEALTH CARE EDUCATION/TRAINING PROGRAM

## 2024-07-05 PROCEDURE — 99283 EMERGENCY DEPT VISIT LOW MDM: CPT | Mod: 25,CS

## 2024-07-05 PROCEDURE — 2500000002 HC RX 250 W HCPCS SELF ADMINISTERED DRUGS (ALT 637 FOR MEDICARE OP, ALT 636 FOR OP/ED): Performed by: STUDENT IN AN ORGANIZED HEALTH CARE EDUCATION/TRAINING PROGRAM

## 2024-07-05 PROCEDURE — 84484 ASSAY OF TROPONIN QUANT: CPT | Performed by: STUDENT IN AN ORGANIZED HEALTH CARE EDUCATION/TRAINING PROGRAM

## 2024-07-05 PROCEDURE — 83735 ASSAY OF MAGNESIUM: CPT | Performed by: STUDENT IN AN ORGANIZED HEALTH CARE EDUCATION/TRAINING PROGRAM

## 2024-07-05 PROCEDURE — 36415 COLL VENOUS BLD VENIPUNCTURE: CPT | Performed by: STUDENT IN AN ORGANIZED HEALTH CARE EDUCATION/TRAINING PROGRAM

## 2024-07-05 PROCEDURE — 71045 X-RAY EXAM CHEST 1 VIEW: CPT | Mod: FOREIGN READ | Performed by: RADIOLOGY

## 2024-07-05 PROCEDURE — 94640 AIRWAY INHALATION TREATMENT: CPT

## 2024-07-05 RX ORDER — IPRATROPIUM BROMIDE AND ALBUTEROL SULFATE 2.5; .5 MG/3ML; MG/3ML
3 SOLUTION RESPIRATORY (INHALATION) ONCE
Status: COMPLETED | OUTPATIENT
Start: 2024-07-05 | End: 2024-07-05

## 2024-07-05 ASSESSMENT — PAIN SCALES - GENERAL: PAINLEVEL_OUTOF10: 5 - MODERATE PAIN

## 2024-07-05 ASSESSMENT — COLUMBIA-SUICIDE SEVERITY RATING SCALE - C-SSRS
1. IN THE PAST MONTH, HAVE YOU WISHED YOU WERE DEAD OR WISHED YOU COULD GO TO SLEEP AND NOT WAKE UP?: NO
6. HAVE YOU EVER DONE ANYTHING, STARTED TO DO ANYTHING, OR PREPARED TO DO ANYTHING TO END YOUR LIFE?: NO
2. HAVE YOU ACTUALLY HAD ANY THOUGHTS OF KILLING YOURSELF?: NO

## 2024-07-05 NOTE — PROGRESS NOTES
Transition of care note:  Patient was turned over to me from prior provider.  This is a 63-year-old female who presented with chest pain.  My evaluation her chest pain has resolved.  A workup was obtained on her with 2 negative high-sensitivity troponins.  Chest x-ray shows no acute infiltrate or effusion.  She feels comfortable being discharged back to the skilled facility.    Diagnoses as of 07/05/24 0757   Atypical chest pain      Visit Vitals  /54   Pulse 93   Temp 35.8 °C (96.4 °F)   Resp 18   Wt 117 kg (257 lb 15 oz)   SpO2 94%   BMI 48.74 kg/m²   Smoking Status Former   BSA 2.24 m²      Labs Reviewed   CBC WITH AUTO DIFFERENTIAL - Abnormal       Result Value    WBC 7.9      nRBC 0.0      RBC 3.96 (*)     Hemoglobin 11.4 (*)     Hematocrit 35.9 (*)     MCV 91      MCH 28.8      MCHC 31.8 (*)     RDW 14.1      Platelets 300      Neutrophils % 64.4      Immature Granulocytes %, Automated 1.9 (*)     Lymphocytes % 20.3      Monocytes % 10.0      Eosinophils % 3.0      Basophils % 0.4      Neutrophils Absolute 5.10      Immature Granulocytes Absolute, Automated 0.15      Lymphocytes Absolute 1.61      Monocytes Absolute 0.79      Eosinophils Absolute 0.24      Basophils Absolute 0.03     COMPREHENSIVE METABOLIC PANEL - Abnormal    Glucose 98      Sodium 136      Potassium 4.5      Chloride 101      Bicarbonate 29      Anion Gap 11      Urea Nitrogen 29 (*)     Creatinine 0.87      eGFR 76      Calcium 8.9      Albumin 3.6      Alkaline Phosphatase 61      Total Protein 6.3 (*)     AST 24      Bilirubin, Total 0.3      ALT 12     MAGNESIUM - Normal    Magnesium 1.88     SARS-COV-2 PCR - Normal    Coronavirus 2019, PCR Not Detected      Narrative:     This assay has received FDA Emergency Use Authorization (EUA) and is only authorized for the duration of time that circumstances exist to justify the authorization of the emergency use of in vitro diagnostic tests for the detection of SARS-CoV-2 virus and/or  diagnosis of COVID-19 infection under section 564(b)(1) of the Act, 21 U.S.C. 360bbb-3(b)(1). This assay is an in vitro diagnostic nucleic acid amplification test for the qualitative detection of SARS-CoV-2 from nasopharyngeal specimens and has been validated for use at [facility]. Negative results do not preclude COVID-19 infections and should not be used as the sole basis for diagnosis, treatment, or other management decisions.     SERIAL TROPONIN-INITIAL - Normal    Troponin I, High Sensitivity 5      Narrative:     Less than 99th percentile of normal range cutoff-  Female and children under 18 years old <14 ng/L; Male <21 ng/L: Negative  Repeat testing should be performed if clinically indicated.     Female and children under 18 years old 14-50 ng/L; Male 21-50 ng/L:  Consistent with possible cardiac damage and possible increased clinical   risk. Serial measurements may help to assess extent of myocardial damage.     >50 ng/L: Consistent with cardiac damage, increased clinical risk and  myocardial infarction. Serial measurements may help assess extent of   myocardial damage.      NOTE: Children less than 1 year old may have higher baseline troponin   levels and results should be interpreted in conjunction with the overall   clinical context.     NOTE: Troponin I testing is performed using a different   testing methodology at [facility] than at [facility]. Direct result comparisons should only   be made within the same method.   SERIAL TROPONIN, 1 HOUR - Normal    Troponin I, High Sensitivity 5      Narrative:     Less than 99th percentile of normal range cutoff-  Female and children under 18 years old <14 ng/L; Male <21 ng/L: Negative  Repeat testing should be performed if clinically indicated.     Female and children under 18 years old 14-50 ng/L; Male 21-50 ng/L:  Consistent with possible cardiac damage and possible increased clinical   risk. Serial  measurements may help to assess extent of myocardial damage.     >50 ng/L: Consistent with cardiac damage, increased clinical risk and  myocardial infarction. Serial measurements may help assess extent of   myocardial damage.      NOTE: Children less than 1 year old may have higher baseline troponin   levels and results should be interpreted in conjunction with the overall   clinical context.     NOTE: Troponin I testing is performed using a different   testing methodology at [facility] than at other   [facility]. Direct result comparisons should only   be made within the same method.   TROPONIN SERIES- (INITIAL, 1 HR)    Narrative:     The following orders were created for panel order Troponin I Series, High Sensitivity (0, 1 HR).  Procedure                               Abnormality         Status                     ---------                               -----------         ------                     Troponin I, High Sensiti...[[id number]]  Normal              Final result               Troponin, High Sensitivi...[[id number]]  Normal              Final result                 Please view results for these tests on the individual orders.       XR chest 1 view   Final Result   No pneumonia or pulmonary edema.   Signed by Tyler Jaramillo DO          1. Atypical chest pain

## 2024-07-05 NOTE — ED TRIAGE NOTES
"Pt states that she has had chest pain for \"some time\", stating that she has been experiencing it since she lived in a group home. She states that it has been worse ever since she moved to [facility]. She appears comfortable at time of triage.   "

## 2024-07-05 NOTE — ED PROVIDER NOTES
HPI   Chief Complaint   Patient presents with    Chest Pain       This is a 63-year-old female with past medical history of morbid obesity, bipolar 2 disorder, schizoaffective disorder, insomnia, COPD, ANN MARIE, PE on Xarelto, spinal stenosis, cervical radiculopathy, osteoarthritis, atrial fibrillation, GERD presenting the emergency department for chest pain.  Patient's chest pain started this evening and she describes it as tightness and tingling.  States she has nearly daily episodes of the same pain.  Denies any aggravation with exertion.  She endorses chronic lower extremity swelling which is largely unchanged.  States she has had the symptoms for several years.  She otherwise denies fever/chills, cough, abdominal pain, shortness of breath, back pain, urinary symptoms, lower extremity pain.      History provided by:  Patient   used: No                        [place] Coma Scale Score: 15                     Patient History   Past Medical History:   Diagnosis Date    Bipolar disorder, currently in remission, most recent episode unspecified (Multi) 02/11/2021    History of depressed bipolar disorder     Past Surgical History:   Procedure Laterality Date    CHOLECYSTECTOMY       No family history on file.  Social History     Tobacco Use    Smoking status: Former     Types: Cigarettes    Smokeless tobacco: Never   Substance Use Topics    Alcohol use: Not Currently    Drug use: Not Currently       Physical Exam   ED Triage Vitals [07/05/24 0528]   Temperature Heart Rate Respirations BP   35.8 °C (96.4 °F) 96 18 124/56      Pulse Ox Temp src Heart Rate Source Patient Position   95 % -- -- --      BP Location FiO2 (%)     -- 28 %       Physical Exam  GEN: no acute distress  CVS/CHEST: reg rate, nl rhythm, no murmurs/gallops/rubs  PULM: Mildly diminished bibasilarly without rales/wheezing/rhonchi  GI: NT/ND, no masses or organomegaly, soft, no guarding  BACK: no CVA tenderness, no vertebral point  tenderness  EXT: b/l equal LE edema, left shin with erythema  NEURO: no focal deficits, no facial asymmetry, moving all extremities  PSYCH: AAOx3 answers questions appropriately  ED Course & MDM   Diagnoses as of 07/06/24 1047   Atypical chest pain       Medical Decision Making  EKG as interpreted by me: Sinus rhythm at 99 bpm, normal axis, intervals unremarkable, no significant ST elevations or depressions    This is a 63-year-old female with past medical history of morbid obesity, bipolar 2 disorder, schizoaffective disorder, insomnia, COPD, ANN MARIE, PE on Xarelto, spinal stenosis, cervical radiculopathy, osteoarthritis, atrial fibrillation, GERD presenting the emergency department for chest pain.  Patient stable upon presentation to the emergency department, no acute distress and vitals are unremarkable.  On exam patient is chronically ill-appearing but comfortable.  She does have diminished breath sounds without any rales, wheezing, rhonchi.  She is denying active chest pain at this time.  She does does have some lower extremity edema which she states is largely chronic and unchanged.  There is some erythema to the left lower extremity though patient also endorses this being chronic and actually improved compared to when she has had cellulitis in the past.  Patient presentation less consistent with ACS.  Per EMR review she has had similar presentations in the past for COPD exacerbations which could be contributing to her presentation.  Patient given DuoNebs in the emergency department.  Given the chronicity of her swelling CHF less likely that will be worked up.  Infectious process also a possibility.  Patient's initial lab work largely unremarkable including negative troponin.  She was signed out to oncoming provider pending remainder of lab work and ultimate disposition.  Procedure  Procedures     Jasper Urbina MD  07/06/24 8238

## 2024-07-06 LAB
ATRIAL RATE: 109 BPM
P AXIS: 76 DEGREES
P OFFSET: 203 MS
P ONSET: 147 MS
PR INTERVAL: 146 MS
Q ONSET: 220 MS
QRS COUNT: 18 BEATS
QRS DURATION: 66 MS
QT INTERVAL: 336 MS
QTC CALCULATION(BAZETT): 452 MS
QTC FREDERICIA: 409 MS
R AXIS: 56 DEGREES
T AXIS: 88 DEGREES
T OFFSET: 388 MS
VENTRICULAR RATE: 109 BPM

## 2024-07-13 LAB
ATRIAL RATE: 99 BPM
P AXIS: 63 DEGREES
PR INTERVAL: 148 MS
Q ONSET: 251 MS
QRS COUNT: 16 BEATS
QRS DURATION: 74 MS
QT INTERVAL: 355 MS
QTC CALCULATION(BAZETT): 456 MS
QTC FREDERICIA: 419 MS
R AXIS: 25 DEGREES
T AXIS: 79 DEGREES
T OFFSET: 428 MS
VENTRICULAR RATE: 99 BPM

## 2024-09-29 ENCOUNTER — APPOINTMENT (OUTPATIENT)
Dept: RADIOLOGY | Facility: HOSPITAL | Age: 64
End: 2024-09-29
Payer: COMMERCIAL

## 2024-09-29 ENCOUNTER — HOSPITAL ENCOUNTER (EMERGENCY)
Facility: HOSPITAL | Age: 64
Discharge: HOME | End: 2024-09-29
Attending: EMERGENCY MEDICINE
Payer: COMMERCIAL

## 2024-09-29 VITALS
RESPIRATION RATE: 22 BRPM | HEIGHT: 61 IN | TEMPERATURE: 96.8 F | OXYGEN SATURATION: 94 % | BODY MASS INDEX: 55.32 KG/M2 | HEART RATE: 118 BPM | DIASTOLIC BLOOD PRESSURE: 60 MMHG | SYSTOLIC BLOOD PRESSURE: 135 MMHG | WEIGHT: 293 LBS

## 2024-09-29 DIAGNOSIS — S42.291A CLOSED FRACTURE OF HEAD OF RIGHT HUMERUS, INITIAL ENCOUNTER: Primary | ICD-10-CM

## 2024-09-29 PROCEDURE — 73030 X-RAY EXAM OF SHOULDER: CPT | Mod: LEFT SIDE | Performed by: RADIOLOGY

## 2024-09-29 PROCEDURE — 72170 X-RAY EXAM OF PELVIS: CPT

## 2024-09-29 PROCEDURE — 73110 X-RAY EXAM OF WRIST: CPT | Mod: RIGHT SIDE | Performed by: RADIOLOGY

## 2024-09-29 PROCEDURE — 73560 X-RAY EXAM OF KNEE 1 OR 2: CPT | Mod: RT

## 2024-09-29 PROCEDURE — 73060 X-RAY EXAM OF HUMERUS: CPT | Mod: RIGHT SIDE | Performed by: RADIOLOGY

## 2024-09-29 PROCEDURE — 71045 X-RAY EXAM CHEST 1 VIEW: CPT | Performed by: RADIOLOGY

## 2024-09-29 PROCEDURE — 72170 X-RAY EXAM OF PELVIS: CPT | Performed by: RADIOLOGY

## 2024-09-29 PROCEDURE — 73552 X-RAY EXAM OF FEMUR 2/>: CPT | Performed by: RADIOLOGY

## 2024-09-29 PROCEDURE — 73070 X-RAY EXAM OF ELBOW: CPT | Mod: RT

## 2024-09-29 PROCEDURE — 73090 X-RAY EXAM OF FOREARM: CPT | Mod: RIGHT SIDE | Performed by: RADIOLOGY

## 2024-09-29 PROCEDURE — 73060 X-RAY EXAM OF HUMERUS: CPT | Mod: RT

## 2024-09-29 PROCEDURE — 71045 X-RAY EXAM CHEST 1 VIEW: CPT

## 2024-09-29 PROCEDURE — 73110 X-RAY EXAM OF WRIST: CPT | Mod: RT

## 2024-09-29 PROCEDURE — 73090 X-RAY EXAM OF FOREARM: CPT | Mod: RT

## 2024-09-29 PROCEDURE — 73560 X-RAY EXAM OF KNEE 1 OR 2: CPT | Performed by: RADIOLOGY

## 2024-09-29 PROCEDURE — 99284 EMERGENCY DEPT VISIT MOD MDM: CPT | Mod: 25

## 2024-09-29 PROCEDURE — 73030 X-RAY EXAM OF SHOULDER: CPT | Mod: LT

## 2024-09-29 PROCEDURE — 73070 X-RAY EXAM OF ELBOW: CPT | Mod: RIGHT SIDE | Performed by: RADIOLOGY

## 2024-09-29 PROCEDURE — 73552 X-RAY EXAM OF FEMUR 2/>: CPT | Mod: RT

## 2024-09-29 PROCEDURE — 2500000001 HC RX 250 WO HCPCS SELF ADMINISTERED DRUGS (ALT 637 FOR MEDICARE OP): Performed by: EMERGENCY MEDICINE

## 2024-09-29 RX ORDER — OXYCODONE AND ACETAMINOPHEN 5; 325 MG/1; MG/1
1 TABLET ORAL ONCE
Status: COMPLETED | OUTPATIENT
Start: 2024-09-29 | End: 2024-09-29

## 2024-09-29 RX ORDER — OXYCODONE AND ACETAMINOPHEN 5; 325 MG/1; MG/1
1 TABLET ORAL EVERY 6 HOURS PRN
Qty: 12 TABLET | Refills: 0 | Status: SHIPPED | OUTPATIENT
Start: 2024-09-29 | End: 2024-10-02

## 2024-09-29 RX ORDER — METOCLOPRAMIDE HYDROCHLORIDE 5 MG/ML
10 INJECTION INTRAMUSCULAR; INTRAVENOUS ONCE
Status: DISCONTINUED | OUTPATIENT
Start: 2024-09-29 | End: 2024-09-29

## 2024-09-29 RX ORDER — OXYCODONE HYDROCHLORIDE 5 MG/1
5 TABLET ORAL ONCE
Status: COMPLETED | OUTPATIENT
Start: 2024-09-29 | End: 2024-09-29

## 2024-09-29 ASSESSMENT — PAIN DESCRIPTION - ORIENTATION: ORIENTATION: RIGHT

## 2024-09-29 ASSESSMENT — COLUMBIA-SUICIDE SEVERITY RATING SCALE - C-SSRS
6. HAVE YOU EVER DONE ANYTHING, STARTED TO DO ANYTHING, OR PREPARED TO DO ANYTHING TO END YOUR LIFE?: NO
2. HAVE YOU ACTUALLY HAD ANY THOUGHTS OF KILLING YOURSELF?: NO
1. IN THE PAST MONTH, HAVE YOU WISHED YOU WERE DEAD OR WISHED YOU COULD GO TO SLEEP AND NOT WAKE UP?: NO

## 2024-09-29 ASSESSMENT — PAIN SCALES - GENERAL
PAINLEVEL_OUTOF10: 8
PAINLEVEL_OUTOF10: 10 - WORST POSSIBLE PAIN
PAINLEVEL_OUTOF10: 10 - WORST POSSIBLE PAIN
PAINLEVEL_OUTOF10: 5 - MODERATE PAIN
PAINLEVEL_OUTOF10: 10 - WORST POSSIBLE PAIN

## 2024-09-29 ASSESSMENT — PAIN DESCRIPTION - PAIN TYPE
TYPE: ACUTE PAIN
TYPE: ACUTE PAIN

## 2024-09-29 ASSESSMENT — PAIN DESCRIPTION - LOCATION: LOCATION: SHOULDER

## 2024-09-29 ASSESSMENT — PAIN - FUNCTIONAL ASSESSMENT
PAIN_FUNCTIONAL_ASSESSMENT: 0-10
PAIN_FUNCTIONAL_ASSESSMENT: 0-10

## 2024-09-29 ASSESSMENT — PAIN DESCRIPTION - PROGRESSION: CLINICAL_PROGRESSION: GRADUALLY IMPROVING

## 2024-09-29 NOTE — ED PROVIDER NOTES
Emergency Department Provider Note        History of Present Illness     History provided by: Patient  Limitations to History: None  External Records Reviewed with Brief Summary: None    HPI:  Gray Kim is a 63 y.o. female presents to the ED after mechanical fall.  She slid down off a sitting position and fell onto her right side.  Complaining of severe right side pain mainly upper extremity.  No head injury or loss of consciousness.  No abdominal pain.  She wears 3 L O2 at baseline.    Physical Exam   Triage vitals:  T 36 °C (96.8 °F)  HR (!) 110  /73  RR (!) 22  O2 (!) 82 % None (Room air)    General: Awake, alert, in no acute distress  Eyes: Gaze conjugate.  No scleral icterus or injection  HENT: Normo-cephalic, atraumatic. No stridor  CV: Regular rate, regular rhythm. Radial pulses 2+ bilaterally  Resp: Breathing non-labored, speaking in full sentences.  Clear to auscultation bilaterally  GI: Soft, non-distended, non-tender. No rebound or guarding.  : Deferred  MSK/Extremities: No gross bony deformities. Right arm pain  Skin: Warm. Appropriate color  Neuro: Alert. Oriented. Face symmetric. Speech is fluent.  Gross strength and sensation intact in b/l UE and LEs  Psych: Appropriate mood and affect    Medical Decision Making & ED Course   Medical Decision Makin y.o. female presents to the ED after mechanical fall.  Right side pain.  Upon arrival to the ED in no acute distress.  She is hypoxic on room air but then it was lower and she does wear O2 at home.  On her home O2 saturation she is within normal limits.  X-rays obtained of extremities show a proximal humeral head fracture.  She is placed in a sling.  She does reside at a SNF.  I believe she is safe for discharge there with orthopedic surgery follow-up.  ----      Differential diagnoses considered include but are not limited to: See MDM     Social Determinants of Health which Significantly Impact Care: None identified     EKG  Independent Interpretation: EKG not obtained    Independent Result Review and Interpretation: Relevant laboratory and radiographic results were reviewed and independently interpreted by myself.  As necessary, they are commented on in the ED Course.    Chronic conditions affecting the patient's care: As documented above in [facility]    The patient was discussed with the following consultants/services: None    Care Considerations: As documented above in [facility]    ED Course:  ED Course as of 10/03/24 1707   Sun Sep 29, 2024   0210 XR chest 1 view [SM]      ED Course User Index  [SM] Estuardo Jurado MD         Diagnoses as of 10/03/24 1707   Closed fracture of head of right humerus, initial encounter     Disposition   As a result of the work-up, the patient was discharged home.  she was informed of her diagnosis and instructed to come back with any concerns or worsening of condition.  she and was agreeable to the plan as discussed above.  she was given the opportunity to ask questions.  All of the patient's questions were answered.    Procedures   Procedures    Patient was seen independently    Estuardo Jurado MD  Emergency Medicine     Estuardo Jurado MD  10/03/24 1708

## 2024-09-29 NOTE — ED TRIAGE NOTES
Pt presents to ED via EMS from [facility] after sustaining a mechanical fall. Pt was standing and using a table to support herself and the table slid out from under her. Pt fell onto her right side, pain to right shoulder, elbow, wrist, hip, knee, ankle. Bruising noted under her right breast and right groin. Denies hitting head, bt, or loc.

## 2024-10-14 ENCOUNTER — OFFICE VISIT (OUTPATIENT)
Dept: ORTHOPEDIC SURGERY | Facility: CLINIC | Age: 64
End: 2024-10-14
Payer: COMMERCIAL

## 2024-10-14 DIAGNOSIS — S42.201A CLOSED TRAUMATIC NONDISPLACED FRACTURE OF PROXIMAL END OF RIGHT HUMERUS, INITIAL ENCOUNTER: Primary | ICD-10-CM

## 2024-10-14 PROCEDURE — 23600 CLTX PROX HUMRL FX W/O MNPJ: CPT | Performed by: ORTHOPAEDIC SURGERY

## 2024-10-14 NOTE — PROGRESS NOTES
Subjective    Patient ID: Gray Kim is a 63 y.o. female.    Chief Complaint: OTHER (F/U ER VISIT RT ARM FX/PATIENT FELL )     Last Surgery: No surgery found  Last Surgery Date: No surgery found    HPI  Patient is a 63-year-old female who sustained an injury to her right shoulder on September 29, 2024.  The patient lives full-time and an assisted living facility.  She had fallen when attempting to pick an object up from the floor.  She landed on her right upper extremity.  She had pain at her right shoulder and was seen at [facility] where she was found to have sustained a nondisplaced right proximal humerus fracture.  She was placed into a sling.  She comes in for her first follow-up.  Pain is still moderate.  She denies numbness and paresthesias.    Objective   Ortho Exam  The patient is in no acute distress.  Exam of her right upper extremity reveals her skin envelope is intact.  She is tender at the proximal humerus.  She is tender to palpation of the proximal humerus.  She is neurovascular intact distally to her median, radial ulnar nerves.    Image Results:  Patient has right shoulder x-rays that show a nondisplaced right proximal humerus surgical neck fracture.  She also has a nondisplaced greater tuberosity fracture.    Assessment/Plan   Encounter Diagnoses:  Closed traumatic nondisplaced fracture of proximal end of right humerus, initial encounter    Patient has a right proximal humerus fracture that can be treated conservatively.  She is nonweightbearing on her right upper extremity.  She will remain in her sling at all times.  She may come out of the sling for hygiene.  AP and scapular Y right shoulder x-rays can be obtained in the facility and sent to me in 3 weeks time.  She will likely require assistance as she can only use her left upper extremity.

## 2024-12-27 ENCOUNTER — TELEPHONE (OUTPATIENT)
Dept: ORTHOPEDIC SURGERY | Facility: CLINIC | Age: 64
End: 2024-12-27
Payer: COMMERCIAL

## 2024-12-27 NOTE — TELEPHONE ENCOUNTER
We received a disc in the mail with an xray of right shoulder for this patient. Dated 11/04/2024.

## 2024-12-30 ENCOUNTER — DOCUMENTATION (OUTPATIENT)
Dept: ORTHOPEDIC SURGERY | Facility: CLINIC | Age: 64
End: 2024-12-30
Payer: COMMERCIAL

## 2024-12-30 NOTE — PROGRESS NOTES
The patient has a right proximal humerus fracture that is being treated conservatively.  She has x-rays from November 4, 2024 that were finally made available for viewing.  She is maintaining adequate alignment at the fracture site.  However there was no callus formation noted on those x-rays.  Repeat AP and scapular Y right shoulder x-rays should be obtained and sent to the office for review within a week.  She is nonweightbearing on her right upper extremity.

## 2025-04-01 ENCOUNTER — LAB REQUISITION (OUTPATIENT)
Dept: LAB | Facility: HOSPITAL | Age: 65
End: 2025-04-01
Payer: COMMERCIAL

## 2025-04-01 DIAGNOSIS — R41.82 ALTERED MENTAL STATUS, UNSPECIFIED: ICD-10-CM

## 2025-04-01 LAB
ANION GAP SERPL CALC-SCNC: 10 MMOL/L (ref 10–20)
BASOPHILS # BLD AUTO: 0.01 X10*3/UL (ref 0–0.1)
BASOPHILS NFR BLD AUTO: 0.1 %
BUN SERPL-MCNC: 23 MG/DL (ref 6–23)
CALCIUM SERPL-MCNC: 9.6 MG/DL (ref 8.6–10.3)
CHLORIDE SERPL-SCNC: 100 MMOL/L (ref 98–107)
CO2 SERPL-SCNC: 32 MMOL/L (ref 21–32)
CREAT SERPL-MCNC: 1.34 MG/DL (ref 0.5–1.05)
EGFRCR SERPLBLD CKD-EPI 2021: 45 ML/MIN/1.73M*2
EOSINOPHIL # BLD AUTO: 0.14 X10*3/UL (ref 0–0.7)
EOSINOPHIL NFR BLD AUTO: 2 %
ERYTHROCYTE [DISTWIDTH] IN BLOOD BY AUTOMATED COUNT: 13.3 % (ref 11.5–14.5)
GLUCOSE SERPL-MCNC: 87 MG/DL (ref 74–99)
HCT VFR BLD AUTO: 36.4 % (ref 36–46)
HGB BLD-MCNC: 11.9 G/DL (ref 12–16)
IMM GRANULOCYTES # BLD AUTO: 0.05 X10*3/UL (ref 0–0.7)
IMM GRANULOCYTES NFR BLD AUTO: 0.7 % (ref 0–0.9)
LYMPHOCYTES # BLD AUTO: 1.35 X10*3/UL (ref 1.2–4.8)
LYMPHOCYTES NFR BLD AUTO: 19.5 %
MCH RBC QN AUTO: 30.3 PG (ref 26–34)
MCHC RBC AUTO-ENTMCNC: 32.7 G/DL (ref 32–36)
MCV RBC AUTO: 93 FL (ref 80–100)
MONOCYTES # BLD AUTO: 0.59 X10*3/UL (ref 0.1–1)
MONOCYTES NFR BLD AUTO: 8.5 %
NEUTROPHILS # BLD AUTO: 4.8 X10*3/UL (ref 1.2–7.7)
NEUTROPHILS NFR BLD AUTO: 69.2 %
NRBC BLD-RTO: 0 /100 WBCS (ref 0–0)
PLATELET # BLD AUTO: 256 X10*3/UL (ref 150–450)
POTASSIUM SERPL-SCNC: 4.3 MMOL/L (ref 3.5–5.3)
RBC # BLD AUTO: 3.93 X10*6/UL (ref 4–5.2)
SODIUM SERPL-SCNC: 138 MMOL/L (ref 136–145)
WBC # BLD AUTO: 6.9 X10*3/UL (ref 4.4–11.3)

## 2025-04-01 PROCEDURE — 80048 BASIC METABOLIC PNL TOTAL CA: CPT | Mod: OUT | Performed by: INTERNAL MEDICINE

## 2025-04-01 PROCEDURE — 85025 COMPLETE CBC W/AUTO DIFF WBC: CPT | Mod: OUT | Performed by: INTERNAL MEDICINE

## 2025-04-22 ENCOUNTER — LAB REQUISITION (OUTPATIENT)
Dept: LAB | Facility: HOSPITAL | Age: 65
End: 2025-04-22
Payer: COMMERCIAL

## 2025-04-22 DIAGNOSIS — I10 ESSENTIAL (PRIMARY) HYPERTENSION: ICD-10-CM

## 2025-04-22 LAB
CREAT SERPL-MCNC: 1.59 MG/DL (ref 0.5–1.05)
EGFRCR SERPLBLD CKD-EPI 2021: 37 ML/MIN/1.73M*2

## 2025-04-22 PROCEDURE — 82565 ASSAY OF CREATININE: CPT | Mod: OUT | Performed by: INTERNAL MEDICINE
